# Patient Record
Sex: MALE | Race: WHITE | HISPANIC OR LATINO | URBAN - METROPOLITAN AREA
[De-identification: names, ages, dates, MRNs, and addresses within clinical notes are randomized per-mention and may not be internally consistent; named-entity substitution may affect disease eponyms.]

---

## 2017-02-22 ENCOUNTER — OUTPATIENT (OUTPATIENT)
Dept: OUTPATIENT SERVICES | Facility: HOSPITAL | Age: 77
LOS: 1 days | End: 2017-02-22
Payer: MEDICAID

## 2017-02-22 ENCOUNTER — LABORATORY RESULT (OUTPATIENT)
Age: 77
End: 2017-02-22

## 2017-02-22 ENCOUNTER — APPOINTMENT (OUTPATIENT)
Dept: INTERNAL MEDICINE | Facility: CLINIC | Age: 77
End: 2017-02-22

## 2017-02-22 ENCOUNTER — MED ADMIN CHARGE (OUTPATIENT)
Age: 77
End: 2017-02-22

## 2017-02-22 VITALS
WEIGHT: 183 LBS | HEIGHT: 60 IN | SYSTOLIC BLOOD PRESSURE: 120 MMHG | BODY MASS INDEX: 35.93 KG/M2 | DIASTOLIC BLOOD PRESSURE: 58 MMHG

## 2017-02-22 DIAGNOSIS — E66.9 OBESITY, UNSPECIFIED: ICD-10-CM

## 2017-02-22 DIAGNOSIS — I10 ESSENTIAL (PRIMARY) HYPERTENSION: ICD-10-CM

## 2017-02-22 DIAGNOSIS — K76.0 FATTY (CHANGE OF) LIVER, NOT ELSEWHERE CLASSIFIED: ICD-10-CM

## 2017-02-22 DIAGNOSIS — Z83.3 FAMILY HISTORY OF DIABETES MELLITUS: ICD-10-CM

## 2017-02-22 DIAGNOSIS — M75.01 ADHESIVE CAPSULITIS OF RIGHT SHOULDER: ICD-10-CM

## 2017-02-22 LAB
ALBUMIN SERPL ELPH-MCNC: 4.3 G/DL — SIGNIFICANT CHANGE UP (ref 3.3–5)
ALP SERPL-CCNC: 141 U/L — HIGH (ref 40–120)
ALT FLD-CCNC: 56 U/L — HIGH (ref 10–45)
ANION GAP SERPL CALC-SCNC: 15 MMOL/L — SIGNIFICANT CHANGE UP (ref 5–17)
AST SERPL-CCNC: 42 U/L — HIGH (ref 10–40)
BILIRUB SERPL-MCNC: 0.7 MG/DL — SIGNIFICANT CHANGE UP (ref 0.2–1.2)
BUN SERPL-MCNC: 20 MG/DL — SIGNIFICANT CHANGE UP (ref 7–23)
CALCIUM SERPL-MCNC: 9.5 MG/DL — SIGNIFICANT CHANGE UP (ref 8.4–10.5)
CHLORIDE SERPL-SCNC: 102 MMOL/L — SIGNIFICANT CHANGE UP (ref 96–108)
CO2 SERPL-SCNC: 26 MMOL/L — SIGNIFICANT CHANGE UP (ref 22–31)
CREAT SERPL-MCNC: 0.94 MG/DL — SIGNIFICANT CHANGE UP (ref 0.5–1.3)
GLUCOSE SERPL-MCNC: 119 MG/DL — HIGH (ref 70–99)
HCT VFR BLD CALC: 43.6 % — SIGNIFICANT CHANGE UP (ref 39–50)
HGB BLD-MCNC: 14.4 G/DL — SIGNIFICANT CHANGE UP (ref 13–17)
MCHC RBC-ENTMCNC: 32.2 PG — SIGNIFICANT CHANGE UP (ref 27–34)
MCHC RBC-ENTMCNC: 33 GM/DL — SIGNIFICANT CHANGE UP (ref 32–36)
MCV RBC AUTO: 97.5 FL — SIGNIFICANT CHANGE UP (ref 80–100)
PLATELET # BLD AUTO: 216 K/UL — SIGNIFICANT CHANGE UP (ref 150–400)
POTASSIUM SERPL-MCNC: 4.4 MMOL/L — SIGNIFICANT CHANGE UP (ref 3.5–5.3)
POTASSIUM SERPL-SCNC: 4.4 MMOL/L — SIGNIFICANT CHANGE UP (ref 3.5–5.3)
PROT SERPL-MCNC: 7.4 G/DL — SIGNIFICANT CHANGE UP (ref 6–8.3)
RBC # BLD: 4.47 M/UL — SIGNIFICANT CHANGE UP (ref 4.2–5.8)
RBC # FLD: 13.3 % — SIGNIFICANT CHANGE UP (ref 10.3–14.5)
SODIUM SERPL-SCNC: 143 MMOL/L — SIGNIFICANT CHANGE UP (ref 135–145)
WBC # BLD: 6.5 K/UL — SIGNIFICANT CHANGE UP (ref 3.8–10.5)
WBC # FLD AUTO: 6.5 K/UL — SIGNIFICANT CHANGE UP (ref 3.8–10.5)

## 2017-02-22 PROCEDURE — 83036 HEMOGLOBIN GLYCOSYLATED A1C: CPT

## 2017-02-22 PROCEDURE — 80053 COMPREHEN METABOLIC PANEL: CPT

## 2017-02-22 PROCEDURE — G0008: CPT

## 2017-02-22 PROCEDURE — G0463: CPT

## 2017-02-22 PROCEDURE — 80061 LIPID PANEL: CPT

## 2017-02-22 PROCEDURE — G0009: CPT

## 2017-02-22 PROCEDURE — 85027 COMPLETE CBC AUTOMATED: CPT

## 2017-02-22 PROCEDURE — 90670 PCV13 VACCINE IM: CPT

## 2017-02-22 PROCEDURE — 90688 IIV4 VACCINE SPLT 0.5 ML IM: CPT

## 2017-02-23 DIAGNOSIS — K76.0 FATTY (CHANGE OF) LIVER, NOT ELSEWHERE CLASSIFIED: ICD-10-CM

## 2017-02-23 DIAGNOSIS — E66.9 OBESITY, UNSPECIFIED: ICD-10-CM

## 2017-02-23 DIAGNOSIS — Z83.3 FAMILY HISTORY OF DIABETES MELLITUS: ICD-10-CM

## 2017-02-23 DIAGNOSIS — E65 LOCALIZED ADIPOSITY: ICD-10-CM

## 2017-02-23 DIAGNOSIS — M75.01 ADHESIVE CAPSULITIS OF RIGHT SHOULDER: ICD-10-CM

## 2017-02-23 DIAGNOSIS — Z00.00 ENCOUNTER FOR GENERAL ADULT MEDICAL EXAMINATION WITHOUT ABNORMAL FINDINGS: ICD-10-CM

## 2017-02-23 DIAGNOSIS — N40.1 BENIGN PROSTATIC HYPERPLASIA WITH LOWER URINARY TRACT SYMPTOMS: ICD-10-CM

## 2017-02-23 DIAGNOSIS — R35.1 NOCTURIA: ICD-10-CM

## 2017-02-23 DIAGNOSIS — N40.2 NODULAR PROSTATE WITHOUT LOWER URINARY TRACT SYMPTOMS: ICD-10-CM

## 2017-02-23 DIAGNOSIS — Z23 ENCOUNTER FOR IMMUNIZATION: ICD-10-CM

## 2017-02-23 LAB
CHOLEST SERPL-MCNC: 184 MG/DL — SIGNIFICANT CHANGE UP (ref 10–199)
HBA1C BLD-MCNC: 6 % — HIGH (ref 4–5.6)
HDLC SERPL-MCNC: 40 MG/DL — SIGNIFICANT CHANGE UP (ref 40–125)
LIPID PNL WITH DIRECT LDL SERPL: SIGNIFICANT CHANGE UP
TOTAL CHOLESTEROL/HDL RATIO MEASUREMENT: 4.6 RATIO — SIGNIFICANT CHANGE UP (ref 3.4–9.6)
TRIGL SERPL-MCNC: 435 MG/DL — HIGH (ref 10–149)

## 2018-04-26 ENCOUNTER — EMERGENCY (EMERGENCY)
Facility: HOSPITAL | Age: 78
LOS: 1 days | Discharge: HOME | End: 2018-04-26
Attending: EMERGENCY MEDICINE
Payer: MEDICAID

## 2018-04-26 VITALS
OXYGEN SATURATION: 96 % | RESPIRATION RATE: 17 BRPM | SYSTOLIC BLOOD PRESSURE: 158 MMHG | HEIGHT: 64 IN | WEIGHT: 175.05 LBS | DIASTOLIC BLOOD PRESSURE: 76 MMHG | TEMPERATURE: 98 F | HEART RATE: 60 BPM

## 2018-04-26 PROCEDURE — 96374 THER/PROPH/DIAG INJ IV PUSH: CPT | Mod: XU

## 2018-04-26 PROCEDURE — 51701 INSERT BLADDER CATHETER: CPT

## 2018-04-26 PROCEDURE — 99284 EMERGENCY DEPT VISIT MOD MDM: CPT | Mod: 25

## 2018-04-26 PROCEDURE — 93005 ELECTROCARDIOGRAM TRACING: CPT | Mod: XU

## 2018-04-26 PROCEDURE — 99283 EMERGENCY DEPT VISIT LOW MDM: CPT

## 2018-04-26 RX ORDER — LIDOCAINE 4 G/100G
1 CREAM TOPICAL ONCE
Qty: 0 | Refills: 0 | Status: COMPLETED | OUTPATIENT
Start: 2018-04-26 | End: 2018-04-26

## 2018-04-26 RX ORDER — IBUPROFEN 200 MG
600 TABLET ORAL ONCE
Qty: 0 | Refills: 0 | Status: COMPLETED | OUTPATIENT
Start: 2018-04-26 | End: 2018-04-26

## 2018-04-26 RX ADMIN — LIDOCAINE 1 PATCH: 4 CREAM TOPICAL at 16:00

## 2018-04-26 RX ADMIN — Medication 600 MILLIGRAM(S): at 16:00

## 2018-04-26 NOTE — ED ADULT NURSE NOTE - OBJECTIVE STATEMENT
78 year old male A&OX3 presents with lower back pain that started Saturday after lifting something heavy. Patient states that pain is located in the lower back and on the left side. Patient also notes that over the past month the left arm has had a burning and shooting pain intermittently. Patient denies taking medication for the pain. Patient has equal strength and sensation bilaterally. Patient has pulses present

## 2018-04-26 NOTE — ED PROVIDER NOTE - EXTREMITY EXAM
no deformity, pain or tenderness, no restriction of movement no dermatitis, no environmental allergies, no food allergies, no immunosuppressive disorder, and no pruritus.

## 2018-04-26 NOTE — ED PROVIDER NOTE - MEDICAL DECISION MAKING DETAILS
AMY Rebollar MD: Pt is a 77 y/o male with no sig pmh who p/w c/o 9/10, non-radiating sharp right lower back pain x 5 days after lifting garbage. Patient has been taking tylenol at home w/out relief. States that he has had persistent pain to R lower back daily despite tylenol. Pain most notable when transitioning from sitting to standing. Pain is not midline and does not radiate into buttock or leg. Denies saddle anesthesia, weakness. numbness, tingling, inability to walk, imbalance, bowel/bladder incontinence, blunt trauma. Exam with +R paraspinal lower lumbosacral muscular ttp without midline ttp. Neg SLR. Suspect muscle spasm. Will treat with nsaids and re-eval. If pt improves, will recommend motrin / tylenol prn pain and PMD f/u with close return precautions.

## 2018-04-26 NOTE — ED PROVIDER NOTE - PLAN OF CARE
1. Stay hydrated. Ice 20mins on, 40 mins off for first 48hrs of onset of pain, after that heat in cycle: 20 mins on, 40 mins off. Avoid strenous activity, twisting and heavy lifting.  2. Take Ibuprofen 600mg every 6hrs for pain as needed- take with food.  You may also try over the counter Lidoderm patches for additional relief   3. Follow up with your PCP  24-48 hours. For continued pain follow up with the spine center [phone]  4. Return to ED for worsening of symptoms including fever, weakness, numbness, bowel/urine incontinence and all other concerns.

## 2018-04-26 NOTE — ED PROVIDER NOTE - OBJECTIVE STATEMENT
78 year old male with no 78 year old male with no sig pmh presents c/o 9/10, non-radiating sharp right lower back pain x 5 days after lifting garbage. Patient has been taking tylenol at home w/out relief. States that he has had persistent pain daily despite tylenol. Pain most notable when transitioning from sitting to standing. Denies saddle anesthesia, weakness. numbness, tingling, inability to walk, imbalance

## 2018-04-26 NOTE — ED PROVIDER NOTE - CARE PLAN
Principal Discharge DX:	Back pain  Assessment and plan of treatment:	1. Stay hydrated. Ice 20mins on, 40 mins off for first 48hrs of onset of pain, after that heat in cycle: 20 mins on, 40 mins off. Avoid strenous activity, twisting and heavy lifting.  2. Take Ibuprofen 600mg every 6hrs for pain as needed- take with food.  You may also try over the counter Lidoderm patches for additional relief   3. Follow up with your PCP  24-48 hours. For continued pain follow up with the spine center [phone]  4. Return to ED for worsening of symptoms including fever, weakness, numbness, bowel/urine incontinence and all other concerns.

## 2018-05-01 ENCOUNTER — APPOINTMENT (OUTPATIENT)
Dept: INTERNAL MEDICINE | Facility: CLINIC | Age: 78
End: 2018-05-01
Payer: MEDICAID

## 2018-05-01 ENCOUNTER — OUTPATIENT (OUTPATIENT)
Dept: OUTPATIENT SERVICES | Facility: HOSPITAL | Age: 78
LOS: 1 days | End: 2018-05-01
Payer: MEDICAID

## 2018-05-01 VITALS
DIASTOLIC BLOOD PRESSURE: 80 MMHG | HEART RATE: 59 BPM | SYSTOLIC BLOOD PRESSURE: 130 MMHG | OXYGEN SATURATION: 97 % | WEIGHT: 181 LBS | HEIGHT: 60 IN | BODY MASS INDEX: 35.53 KG/M2

## 2018-05-01 DIAGNOSIS — I10 ESSENTIAL (PRIMARY) HYPERTENSION: ICD-10-CM

## 2018-05-01 PROCEDURE — G0463: CPT

## 2018-05-01 PROCEDURE — 99213 OFFICE O/P EST LOW 20 MIN: CPT | Mod: GE

## 2018-05-01 PROCEDURE — G9001: CPT

## 2018-05-08 DIAGNOSIS — M54.5 LOW BACK PAIN: ICD-10-CM

## 2018-05-09 DIAGNOSIS — R69 ILLNESS, UNSPECIFIED: ICD-10-CM

## 2018-12-03 ENCOUNTER — OUTPATIENT (OUTPATIENT)
Dept: OUTPATIENT SERVICES | Facility: HOSPITAL | Age: 78
LOS: 1 days | End: 2018-12-03
Payer: MEDICARE

## 2018-12-03 ENCOUNTER — APPOINTMENT (OUTPATIENT)
Dept: INTERNAL MEDICINE | Facility: CLINIC | Age: 78
End: 2018-12-03
Payer: MEDICAID

## 2018-12-03 VITALS
WEIGHT: 179 LBS | SYSTOLIC BLOOD PRESSURE: 140 MMHG | HEIGHT: 60 IN | DIASTOLIC BLOOD PRESSURE: 80 MMHG | BODY MASS INDEX: 35.14 KG/M2

## 2018-12-03 DIAGNOSIS — L72.3 SEBACEOUS CYST: ICD-10-CM

## 2018-12-03 DIAGNOSIS — I10 ESSENTIAL (PRIMARY) HYPERTENSION: ICD-10-CM

## 2018-12-03 PROCEDURE — G0463: CPT

## 2018-12-03 PROCEDURE — 99214 OFFICE O/P EST MOD 30 MIN: CPT | Mod: GC

## 2018-12-10 ENCOUNTER — OUTPATIENT (OUTPATIENT)
Dept: OUTPATIENT SERVICES | Facility: HOSPITAL | Age: 78
LOS: 1 days | End: 2018-12-10

## 2018-12-10 ENCOUNTER — APPOINTMENT (OUTPATIENT)
Dept: PLASTIC SURGERY | Facility: HOSPITAL | Age: 78
End: 2018-12-10

## 2018-12-10 VITALS
HEIGHT: 60 IN | WEIGHT: 179 LBS | DIASTOLIC BLOOD PRESSURE: 66 MMHG | SYSTOLIC BLOOD PRESSURE: 136 MMHG | BODY MASS INDEX: 35.14 KG/M2 | HEART RATE: 63 BPM

## 2018-12-11 DIAGNOSIS — L72.3 SEBACEOUS CYST: ICD-10-CM

## 2018-12-15 NOTE — HISTORY OF PRESENT ILLNESS
[Family Member] : family member [FreeTextEntry8] : 78M PMH back pain, BPH, adhesive capitulitis, hepatic steatosis, presenting for a bump in his back for the past 3 months\par \par #Bump in his back\par - patient accompanied by daughter who provides translation as he is Wallisian-speaking\par - she reports he has had a bump in his back for the past 3 months\par - the bump is itchy and burning but not painful\par - she has noticed the bump has been growing and recently exploded 1 week prior to presentation. The patient's wife expressed ?pus/fluid from the bump however it still persists. \par - patient denies fever/chills, pain, nausea/vomiting, other acute symptoms.

## 2018-12-15 NOTE — REVIEW OF SYSTEMS
[Itching] : itching [Skin Rash] : skin rash [Fever] : no fever [Chills] : no chills [Discharge] : no discharge [Vision Problems] : no vision problems [Earache] : no earache [Chest Pain] : no chest pain [Palpitations] : no palpitations [Shortness Of Breath] : no shortness of breath [Abdominal Pain] : no abdominal pain [Nausea] : no nausea [Vomiting] : no vomiting [Dysuria] : no dysuria [Joint Pain] : no joint pain [Back Pain] : no back pain [Headache] : no headache

## 2018-12-15 NOTE — PHYSICAL EXAM
[No Acute Distress] : no acute distress [Well Nourished] : well nourished [Well Developed] : well developed [Well-Appearing] : well-appearing [Normal Sclera/Conjunctiva] : normal sclera/conjunctiva [PERRL] : pupils equal round and reactive to light [EOMI] : extraocular movements intact [Normal Outer Ear/Nose] : the outer ears and nose were normal in appearance [Normal Oropharynx] : the oropharynx was normal [No JVD] : no jugular venous distention [Supple] : supple [No Lymphadenopathy] : no lymphadenopathy [No Respiratory Distress] : no respiratory distress  [Clear to Auscultation] : lungs were clear to auscultation bilaterally [Normal Rate] : normal rate  [Regular Rhythm] : with a regular rhythm [No Edema] : there was no peripheral edema [Soft] : abdomen soft [Non Tender] : non-tender [Non-distended] : non-distended [No HSM] : no HSM [No Spinal Tenderness] : no spinal tenderness [No Joint Swelling] : no joint swelling [Normal Gait] : normal gait [Normal Affect] : the affect was normal [Normal Insight/Judgement] : insight and judgment were intact [de-identified] : erythematous lesion in the paracentral region of the thoracic region of the back, fluctuant, with central ulceration, expressing puss and likely sebum when compressed. The lesion is circular but appears to contain underlying sinus tract.

## 2018-12-15 NOTE — HEALTH RISK ASSESSMENT
[No falls in past year] : Patient reported no falls in the past year [0] : 2) Feeling down, depressed, or hopeless: Not at all (0) [] : No [[id number]] : 0

## 2018-12-15 NOTE — ASSESSMENT
[FreeTextEntry1] : 78M PMH back pain, BPH, adhesive capitulitis, hepatic steatosis, presenting for a bump in his back for the past 3 months draining pus and sebum with ?sinus tract on physical exam, likely consistent with sebaceous cyst with possible bacterial superinfection.

## 2018-12-15 NOTE — PLAN
[FreeTextEntry1] : #sebaceous cyst\par - performed in-office manual drainage expressing sebum and pus\par - counseled patient on sterile techniques for continued drainage\par - prescribing Bactrim DS BID x 7 days to cover for bacterial superinfection of sebaceous cyst\par - provided patient with plastic surgery referral for possible surgical excision of cyst \par \par HCM\par - will defer flu shot given current cyst with superinfeciton\par - RTC for f/u for flu shot, labs\par \par - discussed with Dr. Yee

## 2018-12-17 ENCOUNTER — OUTPATIENT (OUTPATIENT)
Dept: OUTPATIENT SERVICES | Facility: HOSPITAL | Age: 78
LOS: 1 days | End: 2018-12-17

## 2018-12-17 ENCOUNTER — APPOINTMENT (OUTPATIENT)
Dept: PLASTIC SURGERY | Facility: HOSPITAL | Age: 78
End: 2018-12-17

## 2018-12-17 VITALS
SYSTOLIC BLOOD PRESSURE: 123 MMHG | WEIGHT: 179 LBS | HEIGHT: 61 IN | DIASTOLIC BLOOD PRESSURE: 53 MMHG | BODY MASS INDEX: 33.79 KG/M2 | HEART RATE: 59 BPM

## 2018-12-18 ENCOUNTER — APPOINTMENT (OUTPATIENT)
Dept: INTERNAL MEDICINE | Facility: CLINIC | Age: 78
End: 2018-12-18

## 2018-12-18 VITALS
HEART RATE: 70 BPM | WEIGHT: 178 LBS | OXYGEN SATURATION: 96 % | DIASTOLIC BLOOD PRESSURE: 80 MMHG | BODY MASS INDEX: 33.61 KG/M2 | HEIGHT: 61 IN | SYSTOLIC BLOOD PRESSURE: 132 MMHG

## 2018-12-18 DIAGNOSIS — L72.3 SEBACEOUS CYST: ICD-10-CM

## 2018-12-18 DIAGNOSIS — R35.1 BENIGN PROSTATIC HYPERPLASIA WITH LOWER URINARY TRACT SYMPMS: ICD-10-CM

## 2018-12-18 DIAGNOSIS — M54.12 RADICULOPATHY, CERVICAL REGION: ICD-10-CM

## 2018-12-18 DIAGNOSIS — N40.1 BENIGN PROSTATIC HYPERPLASIA WITH LOWER URINARY TRACT SYMPMS: ICD-10-CM

## 2018-12-18 RX ORDER — CYCLOBENZAPRINE HYDROCHLORIDE 5 MG/1
5 TABLET, FILM COATED ORAL 3 TIMES DAILY
Qty: 15 | Refills: 0 | Status: COMPLETED | COMMUNITY
Start: 2018-05-01 | End: 2018-12-18

## 2018-12-18 RX ORDER — TAMSULOSIN HYDROCHLORIDE 0.4 MG/1
0.4 CAPSULE ORAL
Qty: 90 | Refills: 1 | Status: COMPLETED | COMMUNITY
Start: 2017-02-22 | End: 2018-12-18

## 2018-12-19 ENCOUNTER — CHART COPY (OUTPATIENT)
Age: 78
End: 2018-12-19

## 2018-12-19 LAB
ALBUMIN SERPL ELPH-MCNC: 4.4 G/DL
ALP BLD-CCNC: 153 U/L
ALT SERPL-CCNC: 38 U/L
ANION GAP SERPL CALC-SCNC: 11 MMOL/L
AST SERPL-CCNC: 32 U/L
BILIRUB SERPL-MCNC: 0.3 MG/DL
BUN SERPL-MCNC: 24 MG/DL
CALCIUM SERPL-MCNC: 9.9 MG/DL
CHLORIDE SERPL-SCNC: 101 MMOL/L
CHOLEST SERPL-MCNC: 164 MG/DL
CHOLEST/HDLC SERPL: 4 RATIO
CO2 SERPL-SCNC: 28 MMOL/L
CREAT SERPL-MCNC: 1.29 MG/DL
GLUCOSE SERPL-MCNC: 109 MG/DL
HBA1C MFR BLD HPLC: 5.8 %
HDLC SERPL-MCNC: 41 MG/DL
LDLC SERPL CALC-MCNC: 50 MG/DL
POTASSIUM SERPL-SCNC: 4.1 MMOL/L
PROT SERPL-MCNC: 7.8 G/DL
SODIUM SERPL-SCNC: 140 MMOL/L
TRIGL SERPL-MCNC: 363 MG/DL

## 2018-12-21 NOTE — PHYSICAL EXAM
[No Acute Distress] : no acute distress [Well Nourished] : well nourished [Well Developed] : well developed [Well-Appearing] : well-appearing [Normal Sclera/Conjunctiva] : normal sclera/conjunctiva [PERRL] : pupils equal round and reactive to light [EOMI] : extraocular movements intact [Normal Outer Ear/Nose] : the outer ears and nose were normal in appearance [Normal Oropharynx] : the oropharynx was normal [Normal TMs] : both tympanic membranes were normal [Normal Nasal Mucosa] : the nasal mucosa was normal [No JVD] : no jugular venous distention [Supple] : supple [No Lymphadenopathy] : no lymphadenopathy [Thyroid Normal, No Nodules] : the thyroid was normal and there were no nodules present [No Respiratory Distress] : no respiratory distress  [Clear to Auscultation] : lungs were clear to auscultation bilaterally [No Accessory Muscle Use] : no accessory muscle use [Normal Rate] : normal rate  [Regular Rhythm] : with a regular rhythm [Normal S1, S2] : normal S1 and S2 [No Murmur] : no murmur heard [No Carotid Bruits] : no carotid bruits [Soft] : abdomen soft [Non Tender] : non-tender [No HSM] : no HSM [Normal Bowel Sounds] : normal bowel sounds [Normal Supraclavicular Nodes] : no supraclavicular lymphadenopathy [Normal Posterior Cervical Nodes] : no posterior cervical lymphadenopathy [Normal Anterior Cervical Nodes] : no anterior cervical lymphadenopathy [No Joint Swelling] : no joint swelling [de-identified] : Sebaceous cyst present between the scapula, draining blood, no pus

## 2018-12-21 NOTE — END OF VISIT
[] : Resident [FreeTextEntry3] : 79 yo M with PMhx of chronic back pain, hepatic steatosis, and sebaceous cyst presents for CPE. \par Bilateral neuropathic pain-Likely cervical radiculopathy. PT referral given\par Seaceous Cyst-Well healed \par HCM-FIT given today as patient is fairly active and lack of medical comorbidities. Likely will live

## 2018-12-21 NOTE — REVIEW OF SYSTEMS
[Nocturia] : nocturia [Frequency] : frequency [Back Pain] : back pain [Fever] : no fever [Chills] : no chills [Fatigue] : no fatigue [Night Sweats] : no night sweats [Earache] : no earache [Nasal Discharge] : no nasal discharge [Chest Pain] : no chest pain [Palpitations] : no palpitations [Claudication] : no  leg claudication [Lower Ext Edema] : no lower extremity edema [Orthopena] : no orthopnea [Paroysmal Nocturnal Dyspnea] : no paroysmal nocturnal dyspnea [Shortness Of Breath] : no shortness of breath [Wheezing] : no wheezing [Cough] : no cough [Dyspnea on Exertion] : not dyspnea on exertion [Abdominal Pain] : no abdominal pain [Nausea] : no nausea [Constipation] : no constipation [Diarrhea] : no diarrhea [Vomiting] : no vomiting [Heartburn] : no heartburn [Melena] : no melena [Dysuria] : no dysuria [Incontinence] : no incontinence [Hesitancy] : no hesitancy [Hematuria] : no hematuria [Joint Pain] : no joint pain [Headache] : no headache [Dizziness] : no dizziness

## 2018-12-21 NOTE — HISTORY OF PRESENT ILLNESS
[FreeTextEntry1] : Annual physical exam  [de-identified] : 79 yo M with PMhx of chronic back pain, hepatic steatosis, and sebaceous cyst presents for annual physical examination. Pt reports that he has been experiencing b/l tingling sensation in his arms. It worsens with immobility and improves with exercise. He has been taking Aleve at home with mild improvement. He also endorses b/l calf spasm, especially in the morning. It improves with exercise and movement. It worsens with rest. He also endorses nocturia, increased urinary frequency, and incomplete voiding. \par In regards to his sebaceous cyst, he followed up with plastic surgery and got it drained manually. He was also started on 7 days course of Bactrim. He was advised to follow up in 2 months. \par

## 2018-12-21 NOTE — ASSESSMENT
[FreeTextEntry1] : 77 yo M with PMhx of chronic back pain, hepatic steatosis, and sebaceous cyst presents for annual physical examination.\par \par #Cervical radiculopathy\par -B/l radiating tingling sensation in UE that improves with exercise \par -PT referral and symptomatic management with OTC NSAIDs. \par \par #Sebaceous Cyst \par -Drained during last visit. Currently, nontender, non purulent\par -Follow up with plastic surgery in 2 months \par -7 days course of Bactrim completed \par \par #HCM\par -FIT test for colon cancer screening \par -Flu vaccine administered\par -F/u with HbA1c, lipid profile, and CMP. \par \par Discussed with Dr. Haley

## 2019-02-11 ENCOUNTER — APPOINTMENT (OUTPATIENT)
Dept: PLASTIC SURGERY | Facility: HOSPITAL | Age: 79
End: 2019-02-11

## 2019-02-11 ENCOUNTER — OUTPATIENT (OUTPATIENT)
Dept: OUTPATIENT SERVICES | Facility: HOSPITAL | Age: 79
LOS: 1 days | End: 2019-02-11

## 2019-02-11 VITALS
BODY MASS INDEX: 35.5 KG/M2 | WEIGHT: 188 LBS | HEART RATE: 64 BPM | SYSTOLIC BLOOD PRESSURE: 141 MMHG | DIASTOLIC BLOOD PRESSURE: 76 MMHG | HEIGHT: 61 IN

## 2019-02-11 NOTE — PHYSICAL EXAM
[de-identified] : alert, NAD [de-identified] : 1x3cm healed scar with 3x3cm of induration inferiorly. Non-tender to palpation. No surrounding erythema. Upon palpation, sebum expressible from punctum. No purulence.

## 2019-02-11 NOTE — HISTORY OF PRESENT ILLNESS
[FreeTextEntry1] : 79 y/o M returning to plastics clinic for evaluation of sebaceous cyst to the back. Patient is Monegasque speaking and daughter translating in the room. The patient was first seen on 12/10/2018 for a raised and draining lesion on his upper back that was most likely a sebaceous cyst. He was instructed to perform daily packing and told to come back in 7 days. Last clinic visit was 12/17/2018 and was seen to have non-draining lesion with only a small amount of serosanguinous fluid expressed on palpation.\par \par Denies fever, chills, back pain, drainage from back. Has been cleaning area regularly while showering.

## 2019-02-11 NOTE — ASSESSMENT
[FreeTextEntry1] : 77 y/o M with upper back sebaceous cyst, not infected. No pain. Expressible sebum. Patient would like cyst removed.\par - discussed excision of sebaceous cyst under local with patient\par - patient wishes to have cyst excised\par - f/u in 2 weeks for 40 minute procedure for excision

## 2019-02-13 DIAGNOSIS — L72.3 SEBACEOUS CYST: ICD-10-CM

## 2019-02-25 ENCOUNTER — APPOINTMENT (OUTPATIENT)
Dept: PLASTIC SURGERY | Facility: HOSPITAL | Age: 79
End: 2019-02-25

## 2019-02-25 ENCOUNTER — OUTPATIENT (OUTPATIENT)
Dept: OUTPATIENT SERVICES | Facility: HOSPITAL | Age: 79
LOS: 1 days | End: 2019-02-25

## 2019-02-25 ENCOUNTER — OTHER (OUTPATIENT)
Age: 79
End: 2019-02-25

## 2019-02-25 VITALS
DIASTOLIC BLOOD PRESSURE: 69 MMHG | HEIGHT: 61 IN | WEIGHT: 188 LBS | BODY MASS INDEX: 35.5 KG/M2 | SYSTOLIC BLOOD PRESSURE: 142 MMHG

## 2019-02-25 NOTE — HISTORY OF PRESENT ILLNESS
[FreeTextEntry1] : 78M with upper back sebaceous cyst presented to [facility] clinic for excision today. \par

## 2019-02-25 NOTE — PHYSICAL EXAM
[de-identified] : No palpable/definable mass appreciated on the back. Small amount of sebum could be expressed. No open wound.

## 2019-02-25 NOTE — ASSESSMENT
[FreeTextEntry1] : 78M with upper back sebaceous cyst\par >> As per the patient, several family members expressed the cyst prior to his appointment today. As a result, I am unable to easily appreciate the extent of the mass/cyst this afternoon. I explained that the lack of definition of the mass/cyst makes the excision of the mass/cyst more difficult and potentially fraught with more complications. I further explained that if the cyst reaccumulates, I would be happy to perform the procedure. The patient understood my reasoning and agreed to schedule a follow up appointment in x2 weeks---in the event that the cyst "re-fills". \par

## 2019-03-01 DIAGNOSIS — L72.3 SEBACEOUS CYST: ICD-10-CM

## 2019-03-11 ENCOUNTER — APPOINTMENT (OUTPATIENT)
Dept: PLASTIC SURGERY | Facility: HOSPITAL | Age: 79
End: 2019-03-11

## 2019-03-11 ENCOUNTER — OUTPATIENT (OUTPATIENT)
Dept: OUTPATIENT SERVICES | Facility: HOSPITAL | Age: 79
LOS: 1 days | End: 2019-03-11

## 2019-03-11 VITALS
BODY MASS INDEX: 35.5 KG/M2 | DIASTOLIC BLOOD PRESSURE: 59 MMHG | SYSTOLIC BLOOD PRESSURE: 133 MMHG | HEART RATE: 64 BPM | HEIGHT: 61 IN | WEIGHT: 188 LBS

## 2019-03-12 DIAGNOSIS — L72.3 SEBACEOUS CYST: ICD-10-CM

## 2019-03-18 NOTE — ASSESSMENT
[FreeTextEntry1] : 78M with upper back sebaceous cyst\par Still no discrete appreciable mass/cyst of the upper back this afternoon. It was explained to the family and patient that the lack of definition of the mass/cyst makes the excision of the mass/cyst more difficult and potentially fraught with more complications. Explained that if the cyst reaccumulates, then we could perform the procedure. The patient understood and agreed to schedule a follow up appointment in x4 weeks---in the event that the cyst  "re-fills".

## 2019-03-18 NOTE — REASON FOR VISIT
[Follow-Up: _____] : a [unfilled] follow-up visit [FreeTextEntry1] : 78M with upper back sebaceous cyst presented to [facility] clinic for excision today.

## 2019-03-18 NOTE — PHYSICAL EXAM
[de-identified] : B [de-identified] : Back: In midline upper back no palpable/definable mass appreciated, no sebum could be expressed. No open wound.

## 2019-04-08 ENCOUNTER — APPOINTMENT (OUTPATIENT)
Dept: PLASTIC SURGERY | Facility: HOSPITAL | Age: 79
End: 2019-04-08

## 2020-01-07 NOTE — ED PROVIDER NOTE - NS ED ATTENDING STATEMENT MOD
Warm I have personally performed a face to face diagnostic evaluation on this patient. I have reviewed the ACP note and agree with the history, exam and plan of care, except as noted.

## 2020-08-03 ENCOUNTER — OUTPATIENT (OUTPATIENT)
Dept: OUTPATIENT SERVICES | Facility: HOSPITAL | Age: 80
LOS: 1 days | End: 2020-08-03
Payer: COMMERCIAL

## 2020-08-03 ENCOUNTER — APPOINTMENT (OUTPATIENT)
Dept: INTERNAL MEDICINE | Facility: CLINIC | Age: 80
End: 2020-08-03
Payer: MEDICARE

## 2020-08-03 VITALS
DIASTOLIC BLOOD PRESSURE: 82 MMHG | SYSTOLIC BLOOD PRESSURE: 130 MMHG | BODY MASS INDEX: 34.17 KG/M2 | WEIGHT: 181 LBS | HEIGHT: 61 IN

## 2020-08-03 DIAGNOSIS — L72.3 SEBACEOUS CYST: ICD-10-CM

## 2020-08-03 DIAGNOSIS — M54.5 LOW BACK PAIN: ICD-10-CM

## 2020-08-03 DIAGNOSIS — I10 ESSENTIAL (PRIMARY) HYPERTENSION: ICD-10-CM

## 2020-08-03 DIAGNOSIS — K76.0 FATTY (CHANGE OF) LIVER, NOT ELSEWHERE CLASSIFIED: ICD-10-CM

## 2020-08-03 PROCEDURE — G0463: CPT

## 2020-08-03 PROCEDURE — 99214 OFFICE O/P EST MOD 30 MIN: CPT | Mod: GC

## 2020-08-04 LAB
ALBUMIN SERPL ELPH-MCNC: 4.5 G/DL
ALP BLD-CCNC: 149 U/L
ALT SERPL-CCNC: 41 U/L
ANION GAP SERPL CALC-SCNC: 13 MMOL/L
AST SERPL-CCNC: 28 U/L
BASOPHILS # BLD AUTO: 0.02 K/UL
BASOPHILS NFR BLD AUTO: 0.3 %
BILIRUB SERPL-MCNC: 0.7 MG/DL
BUN SERPL-MCNC: 23 MG/DL
CALCIUM SERPL-MCNC: 9.8 MG/DL
CHLORIDE SERPL-SCNC: 103 MMOL/L
CHOLEST SERPL-MCNC: 164 MG/DL
CHOLEST/HDLC SERPL: 4.9 RATIO
CO2 SERPL-SCNC: 27 MMOL/L
CREAT SERPL-MCNC: 1.17 MG/DL
EOSINOPHIL # BLD AUTO: 0.24 K/UL
EOSINOPHIL NFR BLD AUTO: 3.7 %
ESTIMATED AVERAGE GLUCOSE: 131 MG/DL
GLUCOSE SERPL-MCNC: 102 MG/DL
HBA1C MFR BLD HPLC: 6.2 %
HCT VFR BLD CALC: 52.3 %
HDLC SERPL-MCNC: 34 MG/DL
HGB BLD-MCNC: 17.1 G/DL
IMM GRANULOCYTES NFR BLD AUTO: 0.3 %
LDLC SERPL CALC-MCNC: 57 MG/DL
LYMPHOCYTES # BLD AUTO: 2.39 K/UL
LYMPHOCYTES NFR BLD AUTO: 37.2 %
MAN DIFF?: NORMAL
MCHC RBC-ENTMCNC: 30.6 PG
MCHC RBC-ENTMCNC: 32.7 GM/DL
MCV RBC AUTO: 93.7 FL
MONOCYTES # BLD AUTO: 0.48 K/UL
MONOCYTES NFR BLD AUTO: 7.5 %
NEUTROPHILS # BLD AUTO: 3.28 K/UL
NEUTROPHILS NFR BLD AUTO: 51 %
PLATELET # BLD AUTO: 169 K/UL
POTASSIUM SERPL-SCNC: 4.8 MMOL/L
PROT SERPL-MCNC: 7.2 G/DL
RBC # BLD: 5.58 M/UL
RBC # FLD: 13.7 %
SARS-COV-2 IGG SERPL IA-ACNC: 0.09 INDEX
SARS-COV-2 IGG SERPL QL IA: NEGATIVE
SODIUM SERPL-SCNC: 143 MMOL/L
TRIGL SERPL-MCNC: 369 MG/DL
WBC # FLD AUTO: 6.43 K/UL

## 2020-08-07 NOTE — ASSESSMENT
[FreeTextEntry1] : 80M w/PMH of hepatic steatosis, chronic lower back pain (now resolved), sebaceous cyst (now resolved), presents for an annual visit [facility] and no medical issues since last being seen at our clinic in 12/2018.

## 2020-08-07 NOTE — PLAN
[FreeTextEntry1] : #Hepatic steatosis\par Diagnosed via US in 2015. No hepatomegaly appreciated on exam. Pt drinks 1 beer/wk, never smoker. Not currently taking any Rx medications, and only OTC APAP for minor aches/pains\par -CBC, CMP ordered to assess hematological and electrolyte status, liver enzymes\par \par #HCM\par -A1c, lipid profile ordered to assess glycemic control iso weight gain, obesity\par -Covid-19 Ab ordered per pt request (he has not experienced any Covid-19 related sx, however has recently returned from visiting family, some of whom have contracted the virus, as well as recent airline travel). Pt counseled on value of this test, encouraged to continue to take protective measures, irrespective of the result.

## 2020-08-07 NOTE — HISTORY OF PRESENT ILLNESS
[Family Member] : family member [de-identified] : Pt has not seen a PCP since his last clinic visit in 12/2018. He states he has been in good health, and has recently returned with his wife from a 5-month stay in [facility]. He and his wife self-quarantined for 2 weeks, and did not develop any fevers, SOB, chest pain, abdominal pain, diarrhea/constipation. He states his lower back pain has resolved, and he only takes OTC APAP for minor aches and pains. He states his sebaceous cyst (on his back) has also resolved with time.  [FreeTextEntry1] : No complaints at this time.

## 2020-08-07 NOTE — PHYSICAL EXAM
[Normal] : affect was normal and insight and judgment were intact [de-identified] : Obese  [de-identified] : Some erosion of skin on hands, b/l, w/peeling on palms. Thick, dark cuticles present on all fingers b/l.

## 2020-09-18 ENCOUNTER — APPOINTMENT (OUTPATIENT)
Dept: OPHTHALMOLOGY | Facility: CLINIC | Age: 80
End: 2020-09-18

## 2020-09-23 ENCOUNTER — APPOINTMENT (OUTPATIENT)
Dept: INTERNAL MEDICINE | Facility: CLINIC | Age: 80
End: 2020-09-23
Payer: MEDICARE

## 2020-09-23 ENCOUNTER — NON-APPOINTMENT (OUTPATIENT)
Age: 80
End: 2020-09-23

## 2020-09-23 ENCOUNTER — OUTPATIENT (OUTPATIENT)
Dept: OUTPATIENT SERVICES | Facility: HOSPITAL | Age: 80
LOS: 1 days | End: 2020-09-23
Payer: COMMERCIAL

## 2020-09-23 VITALS — SYSTOLIC BLOOD PRESSURE: 152 MMHG | DIASTOLIC BLOOD PRESSURE: 80 MMHG | HEIGHT: 61 IN

## 2020-09-23 VITALS — HEART RATE: 60 BPM

## 2020-09-23 DIAGNOSIS — Z01.818 ENCOUNTER FOR OTHER PREPROCEDURAL EXAMINATION: ICD-10-CM

## 2020-09-23 DIAGNOSIS — H25.099 OTHER AGE-RELATED INCIPIENT CATARACT, UNSPECIFIED EYE: ICD-10-CM

## 2020-09-23 DIAGNOSIS — I10 ESSENTIAL (PRIMARY) HYPERTENSION: ICD-10-CM

## 2020-09-23 DIAGNOSIS — Z23 ENCOUNTER FOR IMMUNIZATION: ICD-10-CM

## 2020-09-23 PROCEDURE — G0463: CPT | Mod: 25

## 2020-09-23 PROCEDURE — 90688 IIV4 VACCINE SPLT 0.5 ML IM: CPT

## 2020-09-23 PROCEDURE — G0008: CPT

## 2020-09-23 PROCEDURE — 99213 OFFICE O/P EST LOW 20 MIN: CPT | Mod: GE

## 2020-09-23 NOTE — PHYSICAL EXAM
[Soft] : abdomen soft [Non Tender] : non-tender [Non-distended] : non-distended [Normal Bowel Sounds] : normal bowel sounds [Normal] : affect was normal and insight and judgment were intact

## 2020-09-25 ENCOUNTER — APPOINTMENT (OUTPATIENT)
Dept: DISASTER EMERGENCY | Facility: CLINIC | Age: 80
End: 2020-09-25

## 2020-09-25 LAB — SARS-COV-2 N GENE NPH QL NAA+PROBE: NOT DETECTED

## 2020-09-30 ENCOUNTER — APPOINTMENT (OUTPATIENT)
Dept: OPHTHALMOLOGY | Facility: AMBULATORY SURGERY CENTER | Age: 80
End: 2020-09-30
Payer: MEDICARE

## 2020-09-30 PROCEDURE — 66982 XCAPSL CTRC RMVL CPLX WO ECP: CPT | Mod: LT

## 2020-10-01 ENCOUNTER — APPOINTMENT (OUTPATIENT)
Dept: OPHTHALMOLOGY | Facility: CLINIC | Age: 80
End: 2020-10-01

## 2020-10-09 ENCOUNTER — APPOINTMENT (OUTPATIENT)
Dept: OPHTHALMOLOGY | Facility: CLINIC | Age: 80
End: 2020-10-09

## 2020-12-29 NOTE — PLAN
[FreeTextEntry1] : 81 yo M with PMhx of chronic back pain, hepatic steatosis, and sebaceous cyst present for pre-op clearance\par \par ##RCRI score of 0\par -EKG shows sinus bradycardia, but without signs of ischemia or conduction disorder\par -recent lab work in August unremarkable\par -medically optimized for low risk procedure\par -rest of devorah-op care as per ophthalmology

## 2020-12-29 NOTE — HISTORY OF PRESENT ILLNESS
[No Pertinent Cardiac History] : no history of aortic stenosis, atrial fibrillation, coronary artery disease, recent myocardial infarction, or implantable device/pacemaker [No Pertinent Pulmonary History] : no history of asthma, COPD, sleep apnea, or smoking [No Adverse Anesthesia Reaction] : no adverse anesthesia reaction in self or family member [(Patient denies any chest pain, claudication, dyspnea on exertion, orthopnea, palpitations or syncope)] : Patient denies any chest pain, claudication, dyspnea on exertion, orthopnea, palpitations or syncope [Good (7-10 METs)] : Good (7-10 METs) [Family Member] : family member [Aortic Stenosis] : no aortic stenosis [Atrial Fibrillation] : no atrial fibrillation [Coronary Artery Disease] : no coronary artery disease [Recent Myocardial Infarction] : no recent myocardial infarction [Implantable Device/Pacemaker] : no implantable device/pacemaker [Chronic Anticoagulation] : no chronic anticoagulation [Chronic Kidney Disease] : no chronic kidney disease [Diabetes] : no diabetes [FreeTextEntry1] : Cataract surgery  [FreeTextEntry4] : 81 yo M with PMhx of chronic back pain, hepatic steatosis, and sebaceous cyst present for pre-op clearance for cararact surgery\par \par Patient has no cardiac or pulmonary history. Patient is not on any anti-platelet or anti-coagulants. No renal impairment. No prior surgeries. Had colonoscopy several years ago with no noted reaction. Can climb stairs without impairment. [FreeTextEntry7] : N/A

## 2020-12-29 NOTE — REVIEW OF SYSTEMS
[Vision Problems] : vision problems [Negative] : Psychiatric [Discharge] : no discharge [Pain] : no pain [Itching] : no itching

## 2020-12-29 NOTE — ASSESSMENT
[Patient Optimized for Surgery] : Patient optimized for surgery [No Further Testing Recommended] : no further testing recommended [As per surgery] : as per surgery [FreeTextEntry4] : 80 y.o M with PMH of chronic back pain and hepatic steatosis presents for pre-op clearance. RCRI score of 0. Patient is medically optimized for low risk procedure

## 2021-02-22 ENCOUNTER — APPOINTMENT (OUTPATIENT)
Dept: OPHTHALMOLOGY | Facility: CLINIC | Age: 81
End: 2021-02-22

## 2021-03-10 ENCOUNTER — RESULT CHARGE (OUTPATIENT)
Age: 81
End: 2021-03-10

## 2021-03-11 ENCOUNTER — LABORATORY RESULT (OUTPATIENT)
Age: 81
End: 2021-03-11

## 2021-03-11 ENCOUNTER — OUTPATIENT (OUTPATIENT)
Dept: OUTPATIENT SERVICES | Facility: HOSPITAL | Age: 81
LOS: 1 days | End: 2021-03-11
Payer: COMMERCIAL

## 2021-03-11 ENCOUNTER — APPOINTMENT (OUTPATIENT)
Dept: INTERNAL MEDICINE | Facility: CLINIC | Age: 81
End: 2021-03-11
Payer: MEDICARE

## 2021-03-11 ENCOUNTER — NON-APPOINTMENT (OUTPATIENT)
Age: 81
End: 2021-03-11

## 2021-03-11 VITALS
SYSTOLIC BLOOD PRESSURE: 130 MMHG | DIASTOLIC BLOOD PRESSURE: 70 MMHG | HEART RATE: 65 BPM | BODY MASS INDEX: 35.87 KG/M2 | WEIGHT: 190 LBS | RESPIRATION RATE: 16 BRPM | OXYGEN SATURATION: 95 % | HEIGHT: 61 IN

## 2021-03-11 DIAGNOSIS — B35.1 TINEA UNGUIUM: ICD-10-CM

## 2021-03-11 DIAGNOSIS — I10 ESSENTIAL (PRIMARY) HYPERTENSION: ICD-10-CM

## 2021-03-11 DIAGNOSIS — H25.099 OTHER AGE-RELATED INCIPIENT CATARACT, UNSPECIFIED EYE: ICD-10-CM

## 2021-03-11 DIAGNOSIS — Z01.818 ENCOUNTER FOR OTHER PREPROCEDURAL EXAMINATION: ICD-10-CM

## 2021-03-11 PROCEDURE — G0463: CPT | Mod: 25

## 2021-03-11 PROCEDURE — 93005 ELECTROCARDIOGRAM TRACING: CPT

## 2021-03-11 PROCEDURE — 99214 OFFICE O/P EST MOD 30 MIN: CPT | Mod: GC

## 2021-03-11 PROCEDURE — 36415 COLL VENOUS BLD VENIPUNCTURE: CPT

## 2021-03-11 PROCEDURE — 83036 HEMOGLOBIN GLYCOSYLATED A1C: CPT

## 2021-03-11 PROCEDURE — 85027 COMPLETE CBC AUTOMATED: CPT

## 2021-03-11 RX ORDER — SULFAMETHOXAZOLE AND TRIMETHOPRIM 800; 160 MG/1; MG/1
800-160 TABLET ORAL
Qty: 14 | Refills: 0 | Status: DISCONTINUED | COMMUNITY
Start: 2018-12-03 | End: 2021-03-11

## 2021-03-11 RX ORDER — TAMSULOSIN HYDROCHLORIDE 0.4 MG/1
0.4 CAPSULE ORAL
Qty: 30 | Refills: 1 | Status: DISCONTINUED | COMMUNITY
Start: 2018-12-18 | End: 2021-03-11

## 2021-03-12 PROBLEM — B35.1 ONYCHOMYCOSIS: Status: ACTIVE | Noted: 2021-03-12

## 2021-03-12 PROBLEM — Z01.818 PREOP TESTING: Status: ACTIVE | Noted: 2020-09-24

## 2021-03-12 PROBLEM — H25.099 OTHER AGE-RELATED INCIPIENT CATARACT: Status: ACTIVE | Noted: 2020-12-29

## 2021-03-12 LAB
A1C WITH ESTIMATED AVERAGE GLUCOSE RESULT: 5.9 % — HIGH (ref 4–5.6)
ESTIMATED AVERAGE GLUCOSE: 123 MG/DL — HIGH (ref 68–114)
HCT VFR BLD CALC: 45.4 % — SIGNIFICANT CHANGE UP (ref 39–50)
HGB BLD-MCNC: 14.9 G/DL — SIGNIFICANT CHANGE UP (ref 13–17)
MCHC RBC-ENTMCNC: 32.3 PG — SIGNIFICANT CHANGE UP (ref 27–34)
MCHC RBC-ENTMCNC: 32.8 GM/DL — SIGNIFICANT CHANGE UP (ref 32–36)
MCV RBC AUTO: 98.5 FL — SIGNIFICANT CHANGE UP (ref 80–100)
PLATELET # BLD AUTO: 175 K/UL — SIGNIFICANT CHANGE UP (ref 150–400)
RBC # BLD: 4.61 M/UL — SIGNIFICANT CHANGE UP (ref 4.2–5.8)
RBC # FLD: 12.7 % — SIGNIFICANT CHANGE UP (ref 10.3–14.5)
WBC # BLD: 6.07 K/UL — SIGNIFICANT CHANGE UP (ref 3.8–10.5)
WBC # FLD AUTO: 6.07 K/UL — SIGNIFICANT CHANGE UP (ref 3.8–10.5)

## 2021-03-15 ENCOUNTER — APPOINTMENT (OUTPATIENT)
Dept: OPHTHALMOLOGY | Facility: AMBULATORY SURGERY CENTER | Age: 81
End: 2021-03-15
Payer: MEDICARE

## 2021-03-15 PROCEDURE — 66982 XCAPSL CTRC RMVL CPLX WO ECP: CPT | Mod: RT

## 2021-03-15 NOTE — PLAN
[FreeTextEntry1] : 80 yo M Lithuanian-speaking w/ hx of obesity, hepatic steatosis, and sebaceous back cyst (resolved s/p abx), s/p L eye cataract surgery (Sept 2020), now presenting for pre-op risk assessment of R eye cataract surgery.\par \par #Pre-op Risk Assessment\par - RCRI score of 0\par - EKG today shows sinus bradycardia w/o signs of ischemia or conduction disorder, unchanged from last EKG in Sept 2020\par - pt is medically optimized for low risk procedure pending results of repeat CBC and HbA1c (drawn today)\par - devorah-op care per opthalmology\par \par RTC in 6 months for annual CPE, or sooner PRN\par d/w Dr. Moreno\par

## 2021-03-15 NOTE — REVIEW OF SYSTEMS
[Fever] : no fever [Chills] : no chills [Fatigue] : no fatigue [Discharge] : no discharge [Pain] : no pain [Earache] : no earache [Hearing Loss] : no hearing loss [Sore Throat] : no sore throat [Chest Pain] : no chest pain [Palpitations] : no palpitations [Lower Ext Edema] : no lower extremity edema [Shortness Of Breath] : no shortness of breath [Wheezing] : no wheezing [Dyspnea on Exertion] : not dyspnea on exertion [Abdominal Pain] : no abdominal pain [Nausea] : no nausea [Constipation] : no constipation [Diarrhea] : no diarrhea [Vomiting] : no vomiting [Melena] : no melena [Dysuria] : no dysuria [Hematuria] : no hematuria [Muscle Pain] : no muscle pain [Muscle Weakness] : no muscle weakness [Back Pain] : no back pain [Joint Swelling] : no joint swelling [Itching] : no itching [Skin Rash] : no skin rash [Headache] : no headache [Dizziness] : no dizziness [Easy Bleeding] : no easy bleeding [Easy Bruising] : no easy bruising [FreeTextEntry3] : +R eye blurriness from cataract [FreeTextEntry6] : +chronic mild cough

## 2021-03-15 NOTE — HISTORY OF PRESENT ILLNESS
[No Pertinent Cardiac History] : no history of aortic stenosis, atrial fibrillation, coronary artery disease, recent myocardial infarction, or implantable device/pacemaker [No Pertinent Pulmonary History] : no history of asthma, COPD, sleep apnea, or smoking [No Adverse Anesthesia Reaction] : no adverse anesthesia reaction in self or family member [(Patient denies any chest pain, claudication, dyspnea on exertion, orthopnea, palpitations or syncope)] : Patient denies any chest pain, claudication, dyspnea on exertion, orthopnea, palpitations or syncope [Good (7-10 METs)] : Good (7-10 METs) [Family Member] : family member [Patient Declined  Services] : - None: Patient declined  services [Chronic Anticoagulation] : no chronic anticoagulation [Chronic Kidney Disease] : no chronic kidney disease [Diabetes] : no diabetes [FreeTextEntry1] : Right eye cataract surgery [FreeTextEntry2] : 3/15/2021 [FreeTextEntry4] : 82 yo M Danish-speaking w/ hx of obesity, hepatic steatosis, and sebaceous back cyst (resolved s/p abx) presenting for pre-op risk assessment of R eye cataract surgery. Pt is accompanied by his daughter, who is providing translation for him. Pt was last in clinic in Sept 2020 for pre-op risk assessment of L eye cataract surgery. Since then he reports no change in health aside from the clearer vision in his L eye after the surgery, which is why he now would like the surgery done for the R eye as well. Pt reports he is not on any medications at this time. Pt reports a chronic mild cough that he has had for many years. Otherwise, denies f/c/n/v, CP, SOB, abdominal pain, dysuria, hematuria, hematochezia, melena, sick contacts, or recent travel. [FreeTextEntry7] : EKG (9/23/20): Sinus madeline (HR 53)\par EKG (3/11/21): Sinus madeline (HR 59) [FreeTextEntry3] : Pt opted for accompanying daughter to translate for him. [TWNoteComboBox1] : [place] Islander

## 2021-03-15 NOTE — ASSESSMENT
[High Risk Surgery - Intraperitoneal, Intrathoracic or Supringuinal Vascular Procedures] : High Risk Surgery - Intraperitoneal, Intrathoracic or Supringuinal Vascular Procedures - No (0) [Ischemic Heart Disease] : Ischemic Heart Disease - No (0) [Congestive Heart Failure] : Congestive Heart Failure - No (0) [Prior Cerebrovascular Accident or TIA] : Prior Cerebrovascular Accident or TIA - No (0) [Creatinine >= 2mg/dL (1 Point)] : Creatinine >= 2mg/dL - No (0) [Insulin-dependent Diabetic (1 Point)] : Insulin-dependent Diabetic - No (0) [0] : 0 , RCRI Class: I, Risk of Post-Op Cardiac Complications: 3.9%, 95% CI for Risk Estimate: 2.8% - 5.4% [Patient Optimized for Surgery] : Patient optimized for surgery [As per surgery] : as per surgery [Other: _____] : [unfilled] [No Further Testing Recommended] : no further testing recommended [FreeTextEntry4] : \par

## 2021-03-15 NOTE — END OF VISIT
[] : Resident [FreeTextEntry3] : Pt with bibasilar crackles which clear with cough, likely due to atelectasis. EKG reviewed and WNL. Agree with pre-op assessment. No further testing indicated.

## 2021-03-15 NOTE — PHYSICAL EXAM
[Normal Sclera/Conjunctiva] : normal sclera/conjunctiva [EOMI] : extraocular movements intact [Normal Outer Ear/Nose] : the outer ears and nose were normal in appearance [Normal Oropharynx] : the oropharynx was normal [No Respiratory Distress] : no respiratory distress  [No Accessory Muscle Use] : no accessory muscle use [Pedal Pulses Present] : the pedal pulses are present [No Edema] : there was no peripheral edema [No Extremity Clubbing/Cyanosis] : no extremity clubbing/cyanosis [Soft] : abdomen soft [Non Tender] : non-tender [No Masses] : no abdominal mass palpated [Normal Bowel Sounds] : normal bowel sounds [Normal] : no joint swelling and grossly normal strength and tone [Coordination Grossly Intact] : coordination grossly intact [No Focal Deficits] : no focal deficits [Normal Gait] : normal gait [de-identified] : mild bibasilar velcro-like crackles that improved with cough/deep breaths, otherwise clear to auscultation b/l [de-identified] : obese abdomen [de-identified] : multiple onychomycotic fingernails with hyperpigmentation (chronic per patient)

## 2021-03-16 ENCOUNTER — APPOINTMENT (OUTPATIENT)
Dept: OPHTHALMOLOGY | Facility: CLINIC | Age: 81
End: 2021-03-16

## 2021-03-18 DIAGNOSIS — Z01.818 ENCOUNTER FOR OTHER PREPROCEDURAL EXAMINATION: ICD-10-CM

## 2021-03-18 DIAGNOSIS — H25.099 OTHER AGE-RELATED INCIPIENT CATARACT, UNSPECIFIED EYE: ICD-10-CM

## 2021-03-23 ENCOUNTER — APPOINTMENT (OUTPATIENT)
Dept: OPHTHALMOLOGY | Facility: CLINIC | Age: 81
End: 2021-03-23

## 2021-04-19 ENCOUNTER — APPOINTMENT (OUTPATIENT)
Dept: OPHTHALMOLOGY | Facility: CLINIC | Age: 81
End: 2021-04-19

## 2021-08-19 ENCOUNTER — LABORATORY RESULT (OUTPATIENT)
Age: 81
End: 2021-08-19

## 2021-08-20 ENCOUNTER — APPOINTMENT (OUTPATIENT)
Dept: INTERNAL MEDICINE | Facility: CLINIC | Age: 81
End: 2021-08-20
Payer: MEDICARE

## 2021-08-20 ENCOUNTER — OUTPATIENT (OUTPATIENT)
Dept: OUTPATIENT SERVICES | Facility: HOSPITAL | Age: 81
LOS: 1 days | End: 2021-08-20
Payer: COMMERCIAL

## 2021-08-20 VITALS
BODY MASS INDEX: 34.74 KG/M2 | OXYGEN SATURATION: 97 % | DIASTOLIC BLOOD PRESSURE: 70 MMHG | WEIGHT: 184 LBS | SYSTOLIC BLOOD PRESSURE: 140 MMHG | HEART RATE: 73 BPM | HEIGHT: 61 IN

## 2021-08-20 DIAGNOSIS — I10 ESSENTIAL (PRIMARY) HYPERTENSION: ICD-10-CM

## 2021-08-20 DIAGNOSIS — K76.0 FATTY (CHANGE OF) LIVER, NOT ELSEWHERE CLASSIFIED: ICD-10-CM

## 2021-08-20 DIAGNOSIS — E65 LOCALIZED ADIPOSITY: ICD-10-CM

## 2021-08-20 DIAGNOSIS — M75.01 ADHESIVE CAPSULITIS OF RIGHT SHOULDER: ICD-10-CM

## 2021-08-20 PROCEDURE — 99213 OFFICE O/P EST LOW 20 MIN: CPT | Mod: GE

## 2021-08-20 PROCEDURE — 85027 COMPLETE CBC AUTOMATED: CPT

## 2021-08-20 PROCEDURE — G0463: CPT

## 2021-08-20 PROCEDURE — 84080 ASSAY ALKALINE PHOSPHATASES: CPT

## 2021-08-20 PROCEDURE — 80053 COMPREHEN METABOLIC PANEL: CPT

## 2021-08-21 LAB
ALBUMIN SERPL ELPH-MCNC: 5 G/DL — SIGNIFICANT CHANGE UP (ref 3.3–5)
ALP SERPL-CCNC: 125 U/L — HIGH (ref 40–120)
ALT FLD-CCNC: 51 U/L — HIGH (ref 10–45)
ANION GAP SERPL CALC-SCNC: 17 MMOL/L — SIGNIFICANT CHANGE UP (ref 5–17)
AST SERPL-CCNC: 47 U/L — HIGH (ref 10–40)
BILIRUB SERPL-MCNC: 0.9 MG/DL — SIGNIFICANT CHANGE UP (ref 0.2–1.2)
BUN SERPL-MCNC: 28 MG/DL — HIGH (ref 7–23)
CALCIUM SERPL-MCNC: 9.3 MG/DL — SIGNIFICANT CHANGE UP (ref 8.4–10.5)
CHLORIDE SERPL-SCNC: 103 MMOL/L — SIGNIFICANT CHANGE UP (ref 96–108)
CO2 SERPL-SCNC: 22 MMOL/L — SIGNIFICANT CHANGE UP (ref 22–31)
CREAT SERPL-MCNC: 1.21 MG/DL — SIGNIFICANT CHANGE UP (ref 0.5–1.3)
GLUCOSE SERPL-MCNC: 105 MG/DL — HIGH (ref 70–99)
HCT VFR BLD CALC: 43.5 % — SIGNIFICANT CHANGE UP (ref 39–50)
HGB BLD-MCNC: 13.9 G/DL — SIGNIFICANT CHANGE UP (ref 13–17)
MCHC RBC-ENTMCNC: 32 GM/DL — SIGNIFICANT CHANGE UP (ref 32–36)
MCHC RBC-ENTMCNC: 32.9 PG — SIGNIFICANT CHANGE UP (ref 27–34)
MCV RBC AUTO: 103.1 FL — HIGH (ref 80–100)
PLATELET # BLD AUTO: 198 K/UL — SIGNIFICANT CHANGE UP (ref 150–400)
POTASSIUM SERPL-MCNC: 4.5 MMOL/L — SIGNIFICANT CHANGE UP (ref 3.5–5.3)
POTASSIUM SERPL-SCNC: 4.5 MMOL/L — SIGNIFICANT CHANGE UP (ref 3.5–5.3)
PROT SERPL-MCNC: 7.4 G/DL — SIGNIFICANT CHANGE UP (ref 6–8.3)
RBC # BLD: 4.22 M/UL — SIGNIFICANT CHANGE UP (ref 4.2–5.8)
RBC # FLD: 13.2 % — SIGNIFICANT CHANGE UP (ref 10.3–14.5)
SODIUM SERPL-SCNC: 143 MMOL/L — SIGNIFICANT CHANGE UP (ref 135–145)
WBC # BLD: 6.68 K/UL — SIGNIFICANT CHANGE UP (ref 3.8–10.5)
WBC # FLD AUTO: 6.68 K/UL — SIGNIFICANT CHANGE UP (ref 3.8–10.5)

## 2021-08-23 ENCOUNTER — NON-APPOINTMENT (OUTPATIENT)
Age: 81
End: 2021-08-23

## 2021-08-24 LAB
ALP BONE SERPL-MCNC: 43 % — SIGNIFICANT CHANGE UP (ref 12–68)
ALP FLD-CCNC: 119 IU/L — SIGNIFICANT CHANGE UP (ref 48–121)
ALP INTEST CFR SERPL: 10 % — SIGNIFICANT CHANGE UP (ref 0–18)
ALP LIVER SERPL-CCNC: 47 % — SIGNIFICANT CHANGE UP (ref 13–88)

## 2021-08-27 ENCOUNTER — APPOINTMENT (OUTPATIENT)
Dept: RADIOLOGY | Facility: IMAGING CENTER | Age: 81
End: 2021-08-27
Payer: MEDICARE

## 2021-08-27 ENCOUNTER — OUTPATIENT (OUTPATIENT)
Dept: OUTPATIENT SERVICES | Facility: HOSPITAL | Age: 81
LOS: 1 days | End: 2021-08-27
Payer: MEDICARE

## 2021-08-27 DIAGNOSIS — Z00.00 ENCOUNTER FOR GENERAL ADULT MEDICAL EXAMINATION WITHOUT ABNORMAL FINDINGS: ICD-10-CM

## 2021-08-27 PROCEDURE — 77080 DXA BONE DENSITY AXIAL: CPT

## 2021-08-27 PROCEDURE — 77080 DXA BONE DENSITY AXIAL: CPT | Mod: 26

## 2021-08-30 ENCOUNTER — NON-APPOINTMENT (OUTPATIENT)
Age: 81
End: 2021-08-30

## 2021-09-20 NOTE — HISTORY OF PRESENT ILLNESS
[Family Member] : family member [FreeTextEntry1] : cpe [de-identified] : 82 yo M German-speaking w/ hx of obesity, hepatic steatosis, and sebaceous back cyst (resolved s/p abx) presenting for cpe\par \par Hepatic steatosis\par -f/u CBC CMP, alkphos isoenzyme \par -Mediterranean diet counselled\par \par b/l arm pain - Began since the pandemic, starts in the morning and feels like a burning sensation, improves with tylenol and work, does not radiate. 6/10. \par -physical therapy referral\par \par HCM\par -will get zoster vaccine\par -Dexa scan\par -dast\par -ADL/IADL\par -healthcare proxy\par -COVID vaccine in April 6 and May 7, Moderna\par -no urinary retention symptoms no PSA recommended at this time

## 2021-09-20 NOTE — PHYSICAL EXAM
[Normal] : normal sclera/conjunctiva, pupils are equal, round and reactive to light and extraocular movements are intact [No Lymphadenopathy] : no lymphadenopathy [Supple] : supple [No Respiratory Distress] : no respiratory distress  [Thyroid Normal, No Nodules] : the thyroid was normal and there were no nodules present [No Accessory Muscle Use] : no accessory muscle use [Clear to Auscultation] : lungs were clear to auscultation bilaterally [Normal Rate] : normal rate  [Regular Rhythm] : with a regular rhythm [Normal S1, S2] : normal S1 and S2 [Soft] : abdomen soft [Non Tender] : non-tender [Non-distended] : non-distended [No Masses] : no abdominal mass palpated [No HSM] : no HSM [Normal Bowel Sounds] : normal bowel sounds [Normal Affect] : the affect was normal [Normal Insight/Judgement] : insight and judgment were intact [No Joint Swelling] : no joint swelling [de-identified] : limited range of motion b/l with abduction, unable to abduct arm b/l above his head

## 2021-09-20 NOTE — ASSESSMENT
[FreeTextEntry1] : #HCM\par -Dexa scan referral\par -COVID vaccine in April 6 and May 7, Moderna\par -got flu shot in 2020\par -no urinary retention symptoms no PSA recommended at this time\par -RTC 1year

## 2021-09-20 NOTE — HEALTH RISK ASSESSMENT
[Yes] : Yes [2 - 4 times a month (2 pts)] : 2-4 times a month (2 points) [1 or 2 (0 pts)] : 1 or 2 (0 points) [Never (0 pts)] : Never (0 points) [No] : In the past 12 months have you used drugs other than those required for medical reasons? No [No falls in past year] : Patient reported no falls in the past year [0] : 2) Feeling down, depressed, or hopeless: Not at all (0) [PHQ-2 Negative - No further assessment needed] : PHQ-2 Negative - No further assessment needed [With Family] : lives with family [Employed] : employed [] :  [Feels Safe at Home] : Feels safe at home [Fully functional (bathing, dressing, toileting, transferring, walking, feeding)] : Fully functional (bathing, dressing, toileting, transferring, walking, feeding) [Fully functional (using the telephone, shopping, preparing meals, housekeeping, doing laundry, using] : Fully functional and needs no help or supervision to perform IADLs (using the telephone, shopping, preparing meals, housekeeping, doing laundry, using transportation, managing medications and managing finances) [With Patient/Caregiver] : , with patient/caregiver [Designated Healthcare Proxy] : Designated healthcare proxy [Name: ___] : Health Care Proxy's Name: [unfilled]  [Relationship: ___] : Relationship: [unfilled] [I will adhere to the patient's wishes.] : I will adhere to the patient's wishes. [Smoke Detector] : smoke detector [Carbon Monoxide Detector] : carbon monoxide detector [Seat Belt] :  uses seat belt [Sunscreen] : uses sunscreen [] : No [Audit-CScore] : 2 [[id number]] : 0 [Change in mental status noted] : No change in mental status noted [Language] : denies difficulty with language [Behavior] : denies difficulty with behavior [Reasoning] : denies difficulty with reasoning [Reports changes in hearing] : Reports no changes in hearing [Reports changes in vision] : Reports no changes in vision [Reports changes in dental health] : Reports no changes in dental health [Guns at Home] : no guns at home [AdvancecareDate] : 08/20/21 [FreeTextEntry4] : Discussed regarding heatlh care proxy, as well as brought up DNR and DNI. Patient will discuss with family members regarding DNR and DNI status

## 2022-05-13 ENCOUNTER — APPOINTMENT (OUTPATIENT)
Dept: OPHTHALMOLOGY | Facility: CLINIC | Age: 82
End: 2022-05-13
Payer: MEDICARE

## 2022-05-13 ENCOUNTER — NON-APPOINTMENT (OUTPATIENT)
Age: 82
End: 2022-05-13

## 2022-05-13 PROCEDURE — 92133 CPTRZD OPH DX IMG PST SGM ON: CPT

## 2022-05-13 PROCEDURE — 92014 COMPRE OPH EXAM EST PT 1/>: CPT

## 2022-09-09 ENCOUNTER — OUTPATIENT (OUTPATIENT)
Dept: OUTPATIENT SERVICES | Facility: HOSPITAL | Age: 82
LOS: 1 days | End: 2022-09-09
Payer: COMMERCIAL

## 2022-09-09 ENCOUNTER — APPOINTMENT (OUTPATIENT)
Dept: INTERNAL MEDICINE | Facility: CLINIC | Age: 82
End: 2022-09-09

## 2022-09-09 VITALS
DIASTOLIC BLOOD PRESSURE: 58 MMHG | SYSTOLIC BLOOD PRESSURE: 122 MMHG | OXYGEN SATURATION: 96 % | HEART RATE: 59 BPM | BODY MASS INDEX: 33.23 KG/M2 | HEIGHT: 61 IN | WEIGHT: 176 LBS

## 2022-09-09 DIAGNOSIS — M75.01 ADHESIVE CAPSULITIS OF RIGHT SHOULDER: ICD-10-CM

## 2022-09-09 DIAGNOSIS — I10 ESSENTIAL (PRIMARY) HYPERTENSION: ICD-10-CM

## 2022-09-09 DIAGNOSIS — M75.02 ADHESIVE CAPSULITIS OF RIGHT SHOULDER: ICD-10-CM

## 2022-09-09 DIAGNOSIS — Z23 ENCOUNTER FOR IMMUNIZATION: ICD-10-CM

## 2022-09-09 PROCEDURE — 80061 LIPID PANEL: CPT

## 2022-09-09 PROCEDURE — 80053 COMPREHEN METABOLIC PANEL: CPT

## 2022-09-09 PROCEDURE — G0463: CPT

## 2022-09-09 PROCEDURE — 99213 OFFICE O/P EST LOW 20 MIN: CPT | Mod: GE

## 2022-09-09 PROCEDURE — 83036 HEMOGLOBIN GLYCOSYLATED A1C: CPT

## 2022-09-09 NOTE — INTERPRETER SERVICES
[Patient Declined  Services] : - None: Patient declined  services [FreeTextEntry3] : Beth daughter is offering translations and refused

## 2022-09-09 NOTE — PHYSICAL EXAM
[No Acute Distress] : no acute distress [Well Nourished] : well nourished [Well Developed] : well developed [Well-Appearing] : well-appearing [Normal Outer Ear/Nose] : the outer ears and nose were normal in appearance [No JVD] : no jugular venous distention [No Respiratory Distress] : no respiratory distress  [No Accessory Muscle Use] : no accessory muscle use [Clear to Auscultation] : lungs were clear to auscultation bilaterally [Normal Rate] : normal rate  [Regular Rhythm] : with a regular rhythm [Normal S1, S2] : normal S1 and S2 [Soft] : abdomen soft [Non Tender] : non-tender [Non-distended] : non-distended [Normal Bowel Sounds] : normal bowel sounds

## 2022-09-09 NOTE — HISTORY OF PRESENT ILLNESS
[FreeTextEntry1] : f/u [de-identified] : 81 yo M Macedonian-speaking w/ hx of obesity, hepatic steatosis, and sebaceous back cyst (resolved s/p abx) presenting for f/u and stated he was here for physical and flu shot\par \par Overall patient has been feeling well and no acute complaints.\par #Adhesive capsulitis\par -both arms have much improved, patient now has full range of motion with his shoulders\par -patient will continue with his stretching exercises demonstrated in prior visit\par \par #HCM\par CBC, CMP, A1c\par Flu shot obtained this visit\par Shingles ordered\par \par

## 2022-09-09 NOTE — ASSESSMENT
[FreeTextEntry1] : 81 yo M Iraqi-speaking w/ hx of obesity, hepatic steatosis, and sebaceous back cyst (resolved s/p abx) presenting for f/u and stated he was here for physical and flu shot\par \par #HCM\par CBC, CMP, A1c, lipids\par \par RTC for next CPE(patient will need annual as this visit was booked as an RPA instead).\par \par d/w Dr. Galvan\par \par

## 2022-09-12 LAB
ALBUMIN SERPL ELPH-MCNC: 4.3 G/DL
ALP BLD-CCNC: 116 U/L
ALT SERPL-CCNC: 40 U/L
ANION GAP SERPL CALC-SCNC: 9 MMOL/L
AST SERPL-CCNC: 37 U/L
BILIRUB SERPL-MCNC: 0.7 MG/DL
BUN SERPL-MCNC: 23 MG/DL
CALCIUM SERPL-MCNC: 9.8 MG/DL
CHLORIDE SERPL-SCNC: 102 MMOL/L
CHOLEST SERPL-MCNC: 161 MG/DL
CO2 SERPL-SCNC: 28 MMOL/L
CREAT SERPL-MCNC: 0.92 MG/DL
EGFR: 83 ML/MIN/1.73M2
ESTIMATED AVERAGE GLUCOSE: 126 MG/DL
GLUCOSE SERPL-MCNC: 91 MG/DL
HBA1C MFR BLD HPLC: 6 %
HDLC SERPL-MCNC: 44 MG/DL
LDLC SERPL CALC-MCNC: 80 MG/DL
NONHDLC SERPL-MCNC: 117 MG/DL
POTASSIUM SERPL-SCNC: 4.8 MMOL/L
PROT SERPL-MCNC: 6.9 G/DL
SODIUM SERPL-SCNC: 140 MMOL/L
TRIGL SERPL-MCNC: 186 MG/DL

## 2022-09-13 DIAGNOSIS — M75.01 ADHESIVE CAPSULITIS OF RIGHT SHOULDER: ICD-10-CM

## 2022-09-13 DIAGNOSIS — K76.0 FATTY (CHANGE OF) LIVER, NOT ELSEWHERE CLASSIFIED: ICD-10-CM

## 2022-10-27 ENCOUNTER — NON-APPOINTMENT (OUTPATIENT)
Age: 82
End: 2022-10-27

## 2022-10-27 ENCOUNTER — APPOINTMENT (OUTPATIENT)
Dept: OPHTHALMOLOGY | Facility: CLINIC | Age: 82
End: 2022-10-27

## 2022-10-27 PROCEDURE — 92012 INTRM OPH EXAM EST PATIENT: CPT

## 2022-10-27 PROCEDURE — 92083 EXTENDED VISUAL FIELD XM: CPT

## 2022-12-21 ENCOUNTER — NON-APPOINTMENT (OUTPATIENT)
Age: 82
End: 2022-12-21

## 2022-12-21 ENCOUNTER — APPOINTMENT (OUTPATIENT)
Dept: OPHTHALMOLOGY | Facility: CLINIC | Age: 82
End: 2022-12-21

## 2022-12-21 PROCEDURE — 76514 ECHO EXAM OF EYE THICKNESS: CPT

## 2022-12-21 PROCEDURE — 92012 INTRM OPH EXAM EST PATIENT: CPT

## 2022-12-21 PROCEDURE — 92020 GONIOSCOPY: CPT

## 2023-03-30 ENCOUNTER — EMERGENCY (EMERGENCY)
Facility: HOSPITAL | Age: 83
LOS: 1 days | Discharge: HOME | End: 2023-03-30
Attending: EMERGENCY MEDICINE
Payer: COMMERCIAL

## 2023-03-30 VITALS
OXYGEN SATURATION: 98 % | HEART RATE: 67 BPM | DIASTOLIC BLOOD PRESSURE: 76 MMHG | TEMPERATURE: 98 F | RESPIRATION RATE: 18 BRPM | SYSTOLIC BLOOD PRESSURE: 143 MMHG

## 2023-03-30 VITALS
DIASTOLIC BLOOD PRESSURE: 76 MMHG | SYSTOLIC BLOOD PRESSURE: 146 MMHG | RESPIRATION RATE: 16 BRPM | HEART RATE: 58 BPM | OXYGEN SATURATION: 100 %

## 2023-03-30 LAB
ALBUMIN SERPL ELPH-MCNC: 4.3 G/DL — SIGNIFICANT CHANGE UP (ref 3.3–5)
ALP SERPL-CCNC: 129 U/L — HIGH (ref 40–120)
ALT FLD-CCNC: 38 U/L — SIGNIFICANT CHANGE UP (ref 10–45)
APTT BLD: 30 SEC — SIGNIFICANT CHANGE UP (ref 27.5–35.5)
AST SERPL-CCNC: 43 U/L — HIGH (ref 10–40)
BASE EXCESS BLDV CALC-SCNC: 1.1 MMOL/L — SIGNIFICANT CHANGE UP (ref -2–3)
BASOPHILS # BLD AUTO: 0.03 K/UL — SIGNIFICANT CHANGE UP (ref 0–0.2)
BASOPHILS NFR BLD AUTO: 0.4 % — SIGNIFICANT CHANGE UP (ref 0–2)
BILIRUB SERPL-MCNC: 0.5 MG/DL — SIGNIFICANT CHANGE UP (ref 0.2–1.2)
BLD GP AB SCN SERPL QL: NEGATIVE — SIGNIFICANT CHANGE UP
BUN SERPL-MCNC: 16 MG/DL — SIGNIFICANT CHANGE UP (ref 7–23)
CA-I SERPL-SCNC: 1.17 MMOL/L — SIGNIFICANT CHANGE UP (ref 1.15–1.33)
CALCIUM SERPL-MCNC: 8.9 MG/DL — SIGNIFICANT CHANGE UP (ref 8.4–10.5)
CHLORIDE BLDV-SCNC: 104 MMOL/L — SIGNIFICANT CHANGE UP (ref 96–108)
CHLORIDE SERPL-SCNC: 101 MMOL/L — SIGNIFICANT CHANGE UP (ref 96–108)
CO2 BLDV-SCNC: 31 MMOL/L — HIGH (ref 22–26)
CO2 SERPL-SCNC: 26 MMOL/L — SIGNIFICANT CHANGE UP (ref 22–31)
CREAT SERPL-MCNC: 0.9 MG/DL — SIGNIFICANT CHANGE UP (ref 0.5–1.3)
EGFR: 85 ML/MIN/1.73M2 — SIGNIFICANT CHANGE UP
EOSINOPHIL # BLD AUTO: 0.14 K/UL — SIGNIFICANT CHANGE UP (ref 0–0.5)
EOSINOPHIL NFR BLD AUTO: 1.9 % — SIGNIFICANT CHANGE UP (ref 0–6)
ETHANOL SERPL-MCNC: 192 MG/DL — HIGH (ref 0–10)
FLUAV AG NPH QL: SIGNIFICANT CHANGE UP
FLUBV AG NPH QL: SIGNIFICANT CHANGE UP
GAS PNL BLDV: 136 MMOL/L — SIGNIFICANT CHANGE UP (ref 136–145)
GAS PNL BLDV: SIGNIFICANT CHANGE UP
GLUCOSE BLDV-MCNC: 104 MG/DL — HIGH (ref 70–99)
GLUCOSE SERPL-MCNC: 104 MG/DL — HIGH (ref 70–99)
HCO3 BLDV-SCNC: 29 MMOL/L — SIGNIFICANT CHANGE UP (ref 22–29)
HCT VFR BLD CALC: 41.5 % — SIGNIFICANT CHANGE UP (ref 39–50)
HCT VFR BLDA CALC: 42 % — SIGNIFICANT CHANGE UP (ref 39–51)
HGB BLD CALC-MCNC: 14.1 G/DL — SIGNIFICANT CHANGE UP (ref 12.6–17.4)
HGB BLD-MCNC: 13.9 G/DL — SIGNIFICANT CHANGE UP (ref 13–17)
IMM GRANULOCYTES NFR BLD AUTO: 0.3 % — SIGNIFICANT CHANGE UP (ref 0–0.9)
INR BLD: 0.97 RATIO — SIGNIFICANT CHANGE UP (ref 0.88–1.16)
LACTATE BLDV-MCNC: 1.9 MMOL/L — SIGNIFICANT CHANGE UP (ref 0.5–2)
LIDOCAIN IGE QN: 30 U/L — SIGNIFICANT CHANGE UP (ref 7–60)
LYMPHOCYTES # BLD AUTO: 2.35 K/UL — SIGNIFICANT CHANGE UP (ref 1–3.3)
LYMPHOCYTES # BLD AUTO: 32.2 % — SIGNIFICANT CHANGE UP (ref 13–44)
MCHC RBC-ENTMCNC: 32.8 PG — SIGNIFICANT CHANGE UP (ref 27–34)
MCHC RBC-ENTMCNC: 33.5 GM/DL — SIGNIFICANT CHANGE UP (ref 32–36)
MCV RBC AUTO: 97.9 FL — SIGNIFICANT CHANGE UP (ref 80–100)
MONOCYTES # BLD AUTO: 0.51 K/UL — SIGNIFICANT CHANGE UP (ref 0–0.9)
MONOCYTES NFR BLD AUTO: 7 % — SIGNIFICANT CHANGE UP (ref 2–14)
NEUTROPHILS # BLD AUTO: 4.24 K/UL — SIGNIFICANT CHANGE UP (ref 1.8–7.4)
NEUTROPHILS NFR BLD AUTO: 58.2 % — SIGNIFICANT CHANGE UP (ref 43–77)
NRBC # BLD: 0 /100 WBCS — SIGNIFICANT CHANGE UP (ref 0–0)
PCO2 BLDV: 59 MMHG — HIGH (ref 42–55)
PH BLDV: 7.3 — LOW (ref 7.32–7.43)
PLATELET # BLD AUTO: 205 K/UL — SIGNIFICANT CHANGE UP (ref 150–400)
PO2 BLDV: 31 MMHG — SIGNIFICANT CHANGE UP (ref 25–45)
POTASSIUM BLDV-SCNC: 4.3 MMOL/L — SIGNIFICANT CHANGE UP (ref 3.5–5.1)
POTASSIUM SERPL-MCNC: 4.3 MMOL/L — SIGNIFICANT CHANGE UP (ref 3.5–5.3)
POTASSIUM SERPL-SCNC: 4.3 MMOL/L — SIGNIFICANT CHANGE UP (ref 3.5–5.3)
PROT SERPL-MCNC: 7.4 G/DL — SIGNIFICANT CHANGE UP (ref 6–8.3)
PROTHROM AB SERPL-ACNC: 11.2 SEC — SIGNIFICANT CHANGE UP (ref 10.5–13.4)
RBC # BLD: 4.24 M/UL — SIGNIFICANT CHANGE UP (ref 4.2–5.8)
RBC # FLD: 12.8 % — SIGNIFICANT CHANGE UP (ref 10.3–14.5)
RH IG SCN BLD-IMP: POSITIVE — SIGNIFICANT CHANGE UP
RSV RNA NPH QL NAA+NON-PROBE: SIGNIFICANT CHANGE UP
SAO2 % BLDV: 48.4 % — LOW (ref 67–88)
SARS-COV-2 RNA SPEC QL NAA+PROBE: SIGNIFICANT CHANGE UP
SODIUM SERPL-SCNC: 138 MMOL/L — SIGNIFICANT CHANGE UP (ref 135–145)
WBC # BLD: 7.29 K/UL — SIGNIFICANT CHANGE UP (ref 3.8–10.5)
WBC # FLD AUTO: 7.29 K/UL — SIGNIFICANT CHANGE UP (ref 3.8–10.5)

## 2023-03-30 PROCEDURE — 12001 RPR S/N/AX/GEN/TRNK 2.5CM/<: CPT

## 2023-03-30 PROCEDURE — 80053 COMPREHEN METABOLIC PANEL: CPT

## 2023-03-30 PROCEDURE — 70450 CT HEAD/BRAIN W/O DYE: CPT | Mod: MA

## 2023-03-30 PROCEDURE — 72125 CT NECK SPINE W/O DYE: CPT | Mod: 26,MA

## 2023-03-30 PROCEDURE — 87637 SARSCOV2&INF A&B&RSV AMP PRB: CPT

## 2023-03-30 PROCEDURE — 82435 ASSAY OF BLOOD CHLORIDE: CPT

## 2023-03-30 PROCEDURE — 86901 BLOOD TYPING SEROLOGIC RH(D): CPT

## 2023-03-30 PROCEDURE — 83690 ASSAY OF LIPASE: CPT

## 2023-03-30 PROCEDURE — 85610 PROTHROMBIN TIME: CPT

## 2023-03-30 PROCEDURE — 82803 BLOOD GASES ANY COMBINATION: CPT

## 2023-03-30 PROCEDURE — 70450 CT HEAD/BRAIN W/O DYE: CPT | Mod: 26,MA

## 2023-03-30 PROCEDURE — 83605 ASSAY OF LACTIC ACID: CPT

## 2023-03-30 PROCEDURE — 72125 CT NECK SPINE W/O DYE: CPT | Mod: MA

## 2023-03-30 PROCEDURE — 71045 X-RAY EXAM CHEST 1 VIEW: CPT

## 2023-03-30 PROCEDURE — 36415 COLL VENOUS BLD VENIPUNCTURE: CPT

## 2023-03-30 PROCEDURE — 74177 CT ABD & PELVIS W/CONTRAST: CPT | Mod: 26,MA

## 2023-03-30 PROCEDURE — 80307 DRUG TEST PRSMV CHEM ANLYZR: CPT

## 2023-03-30 PROCEDURE — 96374 THER/PROPH/DIAG INJ IV PUSH: CPT | Mod: XU

## 2023-03-30 PROCEDURE — 90715 TDAP VACCINE 7 YRS/> IM: CPT

## 2023-03-30 PROCEDURE — 85018 HEMOGLOBIN: CPT

## 2023-03-30 PROCEDURE — 72170 X-RAY EXAM OF PELVIS: CPT | Mod: 26

## 2023-03-30 PROCEDURE — 85014 HEMATOCRIT: CPT

## 2023-03-30 PROCEDURE — 84132 ASSAY OF SERUM POTASSIUM: CPT

## 2023-03-30 PROCEDURE — 85025 COMPLETE CBC W/AUTO DIFF WBC: CPT

## 2023-03-30 PROCEDURE — 85730 THROMBOPLASTIN TIME PARTIAL: CPT

## 2023-03-30 PROCEDURE — 74177 CT ABD & PELVIS W/CONTRAST: CPT | Mod: MA

## 2023-03-30 PROCEDURE — 72170 X-RAY EXAM OF PELVIS: CPT

## 2023-03-30 PROCEDURE — 86900 BLOOD TYPING SEROLOGIC ABO: CPT

## 2023-03-30 PROCEDURE — 99284 EMERGENCY DEPT VISIT MOD MDM: CPT | Mod: 25

## 2023-03-30 PROCEDURE — 72132 CT LUMBAR SPINE W/DYE: CPT | Mod: 26,MA

## 2023-03-30 PROCEDURE — 71260 CT THORAX DX C+: CPT | Mod: 26,MA

## 2023-03-30 PROCEDURE — 71260 CT THORAX DX C+: CPT | Mod: MA

## 2023-03-30 PROCEDURE — 84295 ASSAY OF SERUM SODIUM: CPT

## 2023-03-30 PROCEDURE — 90471 IMMUNIZATION ADMIN: CPT

## 2023-03-30 PROCEDURE — 86850 RBC ANTIBODY SCREEN: CPT

## 2023-03-30 PROCEDURE — 71045 X-RAY EXAM CHEST 1 VIEW: CPT | Mod: 26

## 2023-03-30 PROCEDURE — 99285 EMERGENCY DEPT VISIT HI MDM: CPT | Mod: 25

## 2023-03-30 PROCEDURE — 82330 ASSAY OF CALCIUM: CPT

## 2023-03-30 PROCEDURE — 82947 ASSAY GLUCOSE BLOOD QUANT: CPT

## 2023-03-30 RX ORDER — SODIUM CHLORIDE 9 MG/ML
500 INJECTION INTRAMUSCULAR; INTRAVENOUS; SUBCUTANEOUS ONCE
Refills: 0 | Status: COMPLETED | OUTPATIENT
Start: 2023-03-30 | End: 2023-03-30

## 2023-03-30 RX ORDER — ACETAMINOPHEN 500 MG
1000 TABLET ORAL ONCE
Refills: 0 | Status: COMPLETED | OUTPATIENT
Start: 2023-03-30 | End: 2023-03-30

## 2023-03-30 RX ORDER — TETANUS TOXOID, REDUCED DIPHTHERIA TOXOID AND ACELLULAR PERTUSSIS VACCINE, ADSORBED 5; 2.5; 8; 8; 2.5 [IU]/.5ML; [IU]/.5ML; UG/.5ML; UG/.5ML; UG/.5ML
0.5 SUSPENSION INTRAMUSCULAR ONCE
Refills: 0 | Status: COMPLETED | OUTPATIENT
Start: 2023-03-30 | End: 2023-03-30

## 2023-03-30 RX ADMIN — SODIUM CHLORIDE 500 MILLILITER(S): 9 INJECTION INTRAMUSCULAR; INTRAVENOUS; SUBCUTANEOUS at 20:00

## 2023-03-30 RX ADMIN — TETANUS TOXOID, REDUCED DIPHTHERIA TOXOID AND ACELLULAR PERTUSSIS VACCINE, ADSORBED 0.5 MILLILITER(S): 5; 2.5; 8; 8; 2.5 SUSPENSION INTRAMUSCULAR at 20:51

## 2023-03-30 RX ADMIN — Medication 400 MILLIGRAM(S): at 20:00

## 2023-03-30 NOTE — ED PROVIDER NOTE - ATTENDING APP SHARED VISIT CONTRIBUTION OF CARE
I performed a history and physical exam of the patient and discussed their management with the resident and /or advanced care provider. I reviewed the resident and /or ACP's note and agree with the documented findings and plan of care. My medical decision making and observations are found above.  Lungs clear abd distended, rt upper pain but not changed with palpation [Negative] : Genitourinary

## 2023-03-30 NOTE — ED PROVIDER NOTE - NSFOLLOWUPINSTRUCTIONS_ED_ALL_ED_FT
Please follow up with your primary care doctor within 1 week.  *Bring all printed lab/test results to your appointment(s).*    Take acetaminophen 500-1000mg every 6 hrs as needed for pain. DO NOT EXCEED 4000mg DAILY.    Return to the ED in 10 days for staple removal. Keep area dry for 24hrs then clean with warm water and soap. Apply topical antibiotic ointment daily.    Rest. Stay well hydrated.    Return to the ED for worsening pain, dizziness, loss of consciousness, or any other concerns. Please follow up with your primary care doctor within 1 week.  *Bring all printed lab/test results to your appointment(s).*    Take ibuprofen 400-600mg every 6 hrs as needed for pain. Take with food     Return to the ED in 10 days for staple removal. Keep area dry for 24hrs then clean with warm water and soap. Apply topical antibiotic ointment daily.    Rest. Stay well hydrated.    Return to the ED for worsening pain, dizziness, loss of consciousness, or any other concerns.  ---------------------------------------------------------------------------------------------  Genoveva un seguimiento con villanueva médico de atención primaria dentro de 1 semana.  *Lleve todos los resultados impresos de laboratorio/pruebas a villanueva(s) shelly(s).*    [place] ibuprofeno 400-600 mg cada 6 horas según sea necesario para el dolor. Yoan con alimentos     Regrese al departamento de emergencias en 10 días para la extracción de grapas. Mantenga el área seca dolly 24 horas y luego limpie con agua tibia y jabón. Aplique ungüento antibiótico tópico diariamente.    Descanse. Manténgase ann hidratado.    Regrese a la arline de emergencias por empeoramiento del dolor, mareos, pérdida del conocimiento o cualquier otra preocupación.

## 2023-03-30 NOTE — ED PROVIDER NOTE - PATIENT PORTAL LINK FT
You can access the FollowMyHealth Patient Portal offered by [facility] by registering at the following website: [email or web address]. By joining [facility]’s FollowMyHealth portal, you will also be able to view your health information using other applications (apps) compatible with our system.

## 2023-03-30 NOTE — ED ADULT NURSE NOTE - OBJECTIVE STATEMENT
82 yo male no PMH, A&Ox3, BIBEMS s/p fall.  Pt reports was getting out of work van, missed step fell out and hit head on curb, +LOC x 1 minute, witnessed by son who is at bedside. Denies dizziness. pt c/o pain to back of head/right side chest, RUQ.  pt admits to drinking 5-6 beers today. Breathing even and unlabored, PERRL, abdomen soft tender RUQ, able to speak in clear sentences, pulses, motor, sensory intact, laceration to back of head 3cm, bleeding at site, no pedal edema. Pt denies chest pain, palpitations, shortness of breath, headache, visual disturbances, numbness/tingling, fever, chills, diaphoresis,  nausea, vomiting, constipation, diarrhea, or urinary symptoms.

## 2023-03-30 NOTE — ED PROVIDER NOTE - CARE PLAN
1 Principal Discharge DX:	Occipital scalp laceration  Secondary Diagnosis:	Abdominal pain  Secondary Diagnosis:	Pain of neck with recent traumatic injury

## 2023-03-30 NOTE — ED ADULT NURSE NOTE - NSIMPLEMENTINTERV_GEN_ALL_ED
Implemented All Universal Safety Interventions:  [place] to call system. Call bell, personal items and telephone within reach. Instruct patient to call for assistance. Room bathroom lighting operational. Non-slip footwear when patient is off stretcher. Physically safe environment: no spills, clutter or unnecessary equipment. Stretcher in lowest position, wheels locked, appropriate side rails in place.

## 2023-03-30 NOTE — ED PROVIDER NOTE - SKIN, MLM
1cm occipital scalp laceration w/minimal active bleeding, no visible FB. Remaining skin exam clean, dry, intact

## 2023-03-30 NOTE — ED PROVIDER NOTE - CLINICAL SUMMARY MEDICAL DECISION MAKING FREE TEXT BOX
Jarvis: 82yo who presents with head laceration, rt upper abd pain. + intox. Would scan head and neck for trauma. would scan abd for rt upper pain. labs needed.

## 2023-03-30 NOTE — ED PROVIDER NOTE - OBJECTIVE STATEMENT
83 male no reported past medical history BIBA accompanied by son for head injury.  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  Patient denies LOC however is intoxicated reporting drinking 5–6 beers today.  Reports RUQ/mid abdominal pain for 1 week, denies worse before or after the fall.  9/10 at this time, nonradiating with no other associated symptoms.  Not on AC.  Reports right-sided chest wall discomfort with palpation, back pain.  Denies CP, SOB, palpitations, dizziness, LOC, N/V, urinary symptoms.  Denies history of alcohol withdrawal symptoms.  Vietnamese translation with son/ALEKS Salomon at bedside

## 2023-03-30 NOTE — ED PROVIDER NOTE - CARDIAC, MLM
Normal rate, regular rhythm.  Heart sounds S1, S2.  No murmurs, rubs or gallops. Mid-axillary mid-to-lower right chest wall ttp without clear deformity.

## 2023-03-31 RX ORDER — FAMOTIDINE 10 MG/ML
1 INJECTION INTRAVENOUS
Qty: 30 | Refills: 0
Start: 2023-03-31

## 2023-05-22 NOTE — ED ADULT NURSE NOTE - NEURO GAIT
steady Cyclophosphamide Pregnancy And Lactation Text: This medication is Pregnancy Category D and it isn't considered safe during pregnancy. This medication is excreted in breast milk.

## 2023-06-15 ENCOUNTER — INPATIENT (INPATIENT)
Facility: HOSPITAL | Age: 83
LOS: 4 days | Discharge: HOME | DRG: 25 | End: 2023-06-20
Attending: NEUROLOGICAL SURGERY | Admitting: NEUROLOGICAL SURGERY
Payer: MEDICARE

## 2023-06-15 ENCOUNTER — TRANSCRIPTION ENCOUNTER (OUTPATIENT)
Age: 83
End: 2023-06-15

## 2023-06-15 VITALS
SYSTOLIC BLOOD PRESSURE: 152 MMHG | OXYGEN SATURATION: 97 % | TEMPERATURE: 98 F | RESPIRATION RATE: 16 BRPM | DIASTOLIC BLOOD PRESSURE: 78 MMHG | WEIGHT: 195.11 LBS | HEART RATE: 54 BPM | HEIGHT: 60 IN

## 2023-06-15 LAB
ALBUMIN SERPL ELPH-MCNC: 4.4 G/DL — SIGNIFICANT CHANGE UP (ref 3.3–5)
ALP SERPL-CCNC: 132 U/L — HIGH (ref 40–120)
ALT FLD-CCNC: 33 U/L — SIGNIFICANT CHANGE UP (ref 10–45)
AMMONIA BLD-MCNC: 44 UMOL/L — SIGNIFICANT CHANGE UP (ref 11–55)
ANION GAP SERPL CALC-SCNC: 13 MMOL/L — SIGNIFICANT CHANGE UP (ref 5–17)
APTT BLD: 27.6 SEC — SIGNIFICANT CHANGE UP (ref 27.5–35.5)
AST SERPL-CCNC: 30 U/L — SIGNIFICANT CHANGE UP (ref 10–40)
BASE EXCESS BLDV CALC-SCNC: 1.8 MMOL/L — SIGNIFICANT CHANGE UP (ref -2–3)
BASOPHILS # BLD AUTO: 0.02 K/UL — SIGNIFICANT CHANGE UP (ref 0–0.2)
BASOPHILS NFR BLD AUTO: 0.3 % — SIGNIFICANT CHANGE UP (ref 0–2)
BILIRUB SERPL-MCNC: 0.7 MG/DL — SIGNIFICANT CHANGE UP (ref 0.2–1.2)
BUN SERPL-MCNC: 19 MG/DL — SIGNIFICANT CHANGE UP (ref 7–23)
CA-I SERPL-SCNC: 1.25 MMOL/L — SIGNIFICANT CHANGE UP (ref 1.15–1.33)
CALCIUM SERPL-MCNC: 9.6 MG/DL — SIGNIFICANT CHANGE UP (ref 8.4–10.5)
CHLORIDE BLDV-SCNC: 102 MMOL/L — SIGNIFICANT CHANGE UP (ref 96–108)
CHLORIDE SERPL-SCNC: 101 MMOL/L — SIGNIFICANT CHANGE UP (ref 96–108)
CO2 BLDV-SCNC: 30 MMOL/L — HIGH (ref 22–26)
CO2 SERPL-SCNC: 23 MMOL/L — SIGNIFICANT CHANGE UP (ref 22–31)
CREAT SERPL-MCNC: 0.89 MG/DL — SIGNIFICANT CHANGE UP (ref 0.5–1.3)
EGFR: 85 ML/MIN/1.73M2 — SIGNIFICANT CHANGE UP
EOSINOPHIL # BLD AUTO: 0.17 K/UL — SIGNIFICANT CHANGE UP (ref 0–0.5)
EOSINOPHIL NFR BLD AUTO: 2.7 % — SIGNIFICANT CHANGE UP (ref 0–6)
ETHANOL SERPL-MCNC: <10 MG/DL — SIGNIFICANT CHANGE UP (ref 0–10)
GAS PNL BLDV: 132 MMOL/L — LOW (ref 136–145)
GAS PNL BLDV: SIGNIFICANT CHANGE UP
GLUCOSE BLDV-MCNC: 89 MG/DL — SIGNIFICANT CHANGE UP (ref 70–99)
GLUCOSE SERPL-MCNC: 94 MG/DL — SIGNIFICANT CHANGE UP (ref 70–99)
HCO3 BLDV-SCNC: 28 MMOL/L — SIGNIFICANT CHANGE UP (ref 22–29)
HCT VFR BLD CALC: 43.5 % — SIGNIFICANT CHANGE UP (ref 39–50)
HCT VFR BLDA CALC: 44 % — SIGNIFICANT CHANGE UP (ref 39–51)
HGB BLD CALC-MCNC: 14.5 G/DL — SIGNIFICANT CHANGE UP (ref 12.6–17.4)
HGB BLD-MCNC: 14.3 G/DL — SIGNIFICANT CHANGE UP (ref 13–17)
IMM GRANULOCYTES NFR BLD AUTO: 0.5 % — SIGNIFICANT CHANGE UP (ref 0–0.9)
INR BLD: 0.99 RATIO — SIGNIFICANT CHANGE UP (ref 0.88–1.16)
LACTATE BLDV-MCNC: 1.3 MMOL/L — SIGNIFICANT CHANGE UP (ref 0.5–2)
LYMPHOCYTES # BLD AUTO: 2 K/UL — SIGNIFICANT CHANGE UP (ref 1–3.3)
LYMPHOCYTES # BLD AUTO: 31.7 % — SIGNIFICANT CHANGE UP (ref 13–44)
MCHC RBC-ENTMCNC: 32.3 PG — SIGNIFICANT CHANGE UP (ref 27–34)
MCHC RBC-ENTMCNC: 32.9 GM/DL — SIGNIFICANT CHANGE UP (ref 32–36)
MCV RBC AUTO: 98.2 FL — SIGNIFICANT CHANGE UP (ref 80–100)
MONOCYTES # BLD AUTO: 0.53 K/UL — SIGNIFICANT CHANGE UP (ref 0–0.9)
MONOCYTES NFR BLD AUTO: 8.4 % — SIGNIFICANT CHANGE UP (ref 2–14)
NEUTROPHILS # BLD AUTO: 3.55 K/UL — SIGNIFICANT CHANGE UP (ref 1.8–7.4)
NEUTROPHILS NFR BLD AUTO: 56.4 % — SIGNIFICANT CHANGE UP (ref 43–77)
NRBC # BLD: 0 /100 WBCS — SIGNIFICANT CHANGE UP (ref 0–0)
PCO2 BLDV: 50 MMHG — SIGNIFICANT CHANGE UP (ref 42–55)
PH BLDV: 7.36 — SIGNIFICANT CHANGE UP (ref 7.32–7.43)
PLATELET # BLD AUTO: 204 K/UL — SIGNIFICANT CHANGE UP (ref 150–400)
PO2 BLDV: 45 MMHG — SIGNIFICANT CHANGE UP (ref 25–45)
POTASSIUM BLDV-SCNC: 4.1 MMOL/L — SIGNIFICANT CHANGE UP (ref 3.5–5.1)
POTASSIUM SERPL-MCNC: 4.1 MMOL/L — SIGNIFICANT CHANGE UP (ref 3.5–5.3)
POTASSIUM SERPL-SCNC: 4.1 MMOL/L — SIGNIFICANT CHANGE UP (ref 3.5–5.3)
PROT SERPL-MCNC: 7.6 G/DL — SIGNIFICANT CHANGE UP (ref 6–8.3)
PROTHROM AB SERPL-ACNC: 11.5 SEC — SIGNIFICANT CHANGE UP (ref 10.5–13.4)
RBC # BLD: 4.43 M/UL — SIGNIFICANT CHANGE UP (ref 4.2–5.8)
RBC # FLD: 12.9 % — SIGNIFICANT CHANGE UP (ref 10.3–14.5)
SAO2 % BLDV: 75.9 % — SIGNIFICANT CHANGE UP (ref 67–88)
SODIUM SERPL-SCNC: 137 MMOL/L — SIGNIFICANT CHANGE UP (ref 135–145)
WBC # BLD: 6.3 K/UL — SIGNIFICANT CHANGE UP (ref 3.8–10.5)
WBC # FLD AUTO: 6.3 K/UL — SIGNIFICANT CHANGE UP (ref 3.8–10.5)

## 2023-06-15 PROCEDURE — 99285 EMERGENCY DEPT VISIT HI MDM: CPT

## 2023-06-15 PROCEDURE — 73523 X-RAY EXAM HIPS BI 5/> VIEWS: CPT | Mod: 26

## 2023-06-15 PROCEDURE — 71045 X-RAY EXAM CHEST 1 VIEW: CPT | Mod: 26

## 2023-06-15 NOTE — ED ADULT NURSE NOTE - NSFALLRISKINTERV_ED_ALL_ED

## 2023-06-15 NOTE — ED PROVIDER NOTE - OBJECTIVE STATEMENT
Attending note.  Patient was seen in room #39 to the right.  Macanese interpretation provided by granddaughter who provides additional history as well.  Patient had a fall yesterday and has increased confusion and weakness today.  Patient complaining of headache for the last week.  He has no fever, nausea, vomiting, diarrhea.  He has a history of fatty liver disease.  He takes no medications.  Has no allergies to medications.  Denies any abdominal pain or urinary symptoms.  He has no respiratory distress or coughing.

## 2023-06-15 NOTE — ED ADULT NURSE NOTE - OBJECTIVE STATEMENT
83 year old male coming in for altered mental status. Pt is Estonian speaking grand daughter at bedside able to translate for patient. As per family member over the past weak the patient has had increasing weakness and confusion. Pt had a fall yesterday. Patient has been complaining of a headache for one week. On arrival patient has a slight facial droop on the left side as well as weakness on the left side.     pt 83 year old male coming in for altered mental status. Pt is Bulgarian speaking grand daughter at bedside able to translate for patient. As per family member over the past weak the patient has had increasing weakness and confusion. Pt had a fall yesterday, did not hit head. Patient has been complaining of a headache for one week. On arrival patient has a slight facial droop on the left side as well as weakness on the left side. 83 year old male with PMHx of fatty liver coming in for altered mental status. Pt is Somali speaking grand daughter at bedside able to translate for patient. As per family member over the past weak the patient has had increasing weakness and confusion. Pt had a fall yesterday, states fell on L side and hit head, however did not lose consciousness. Patient has been complaining of a headache for one week, 8/10 in severity at present. On arrival patient has a slight facial droop on the left side as well as weakness on the left side. Granddaughter notes patient "has not been acting like himself" for the last week with several episodes of incontinence, atypical for patient. Denies dizziness, chest pain, abdominal pain, nausea, vomiting. AOx4 and speaking coherently. Breathing is unlabored, spontaneous, and symmetrical. Abdomen is round and distended, nontender. Non-pitting edema noted to bilateral lower extremities. Muscle strength decreased on L side of upper and lower extremities.

## 2023-06-15 NOTE — ED PROVIDER NOTE - DIFFERENTIAL DIAGNOSIS
Differential not limited to infection versus intracranial hemorrhage versus CVA versus alcohol intoxication versus hepatic encephalopathy Differential Diagnosis

## 2023-06-15 NOTE — ED ADULT TRIAGE NOTE - AS PAIN REST
----- Message from Leyda HUERTA PA-C sent at 5/2/2018  3:25 PM CDT -----  TSH thyroid test is normal - continue same dose levothyroxine  
Discussed with patient, Leyda Sutton's recommendations for recent lab results.  Patient's TSH is in the normal range.  Patient will continue her Levothyroxine dose as scheduled.  Patient verbalized understanding.  
4 (moderate pain)

## 2023-06-15 NOTE — ED PROVIDER NOTE - PHYSICAL EXAMINATION
Attending note.  Patient is alert and in no acute distress.  There is slight left facial asymmetry with weakness.  Lungs are clear and equal bilaterally.  Heart is regular rate and rhythm.  Abdomen is obese, soft, nontender nondistended.  There is trace ankle edema.  There is weakness in the left upper and lower extremity.

## 2023-06-15 NOTE — ED PROVIDER NOTE - CLINICAL SUMMARY MEDICAL DECISION MAKING FREE TEXT BOX
Please call [phone] to schedule Lexiscan      Attending note.  Patient with increased confusion and left facial droop and left hemiaplasia.  CT head, alcohol level, glucose level, ammonia level, troponin, urinalysis, basic labs.

## 2023-06-15 NOTE — ED PROVIDER NOTE - PROGRESS NOTE DETAILS
[facility] PGY 2 nsgx paged pt w [facility]. Neurosurgery informed and will see pt promptly. endorsed to me by kita. -Tito Mahajan MD- [facility] PGY 2 Neurosurgery aware

## 2023-06-15 NOTE — ED ADULT NURSE NOTE - ABDOMEN
I will START or STAY ON the medications listed below when I get home from the hospital:    aspirin 81 mg oral delayed release tablet  -- 1 tab(s) by mouth once a day  -- Indication: For CAD    lisinopril 2.5 mg oral tablet  -- 1 tab(s) by mouth once a day  -- Indication: For HTN    apixaban 2.5 mg oral tablet  -- 1 tab(s) by mouth every 12 hours  -- Indication: For A-Fib    meclizine 12.5 mg oral tablet  -- 1 tab(s) by mouth 3 times a day, As needed, Dizziness  -- Indication: For Vertigo    atorvastatin 10 mg oral tablet  -- 1 tab(s) by mouth once a day (at bedtime)  -- Indication: For Hyperlipidemia     metoprolol tartrate 50 mg oral tablet  -- 1 tab(s) by mouth once a day  -- Indication: For HTN    furosemide 20 mg oral tablet  -- 1 tab(s) by mouth once a day  -- Indication: For CHF    ferrous sulfate 325 mg (65 mg elemental iron) oral delayed release tablet  -- 1 tab(s) by mouth once a day  -- Indication: For Anemia    pantoprazole 40 mg oral delayed release tablet  -- 1 tab(s) by mouth once a day (before a meal)  -- Indication: For GERD    cholecalciferol oral tablet  -- 2000 unit(s) by mouth once a day  -- Indication: For Vitamin I will START or STAY ON the medications listed below when I get home from the hospital:    aspirin 81 mg oral delayed release tablet  -- 1 tab(s) by mouth once a day  -- Indication: For Coronary Artery Disease    lisinopril 2.5 mg oral tablet  -- 1 tab(s) by mouth once a day  -- Indication: For HTN    apixaban 2.5 mg oral tablet  -- 1 tab(s) by mouth every 12 hours  -- Indication: For Atrial fibrillation    meclizine 12.5 mg oral tablet  -- 1 tab(s) by mouth 3 times a day, As needed, Dizziness  -- Indication: For Dizziness    atorvastatin 10 mg oral tablet  -- 1 tab(s) by mouth once a day (at bedtime)  -- Indication: For Coronary Artery Disease    metoprolol succinate 50 mg oral capsule, extended release  -- 1 cap(s) by mouth once a day   -- Do not drink alcoholic beverages when taking this medication.  It is very important that you take or use this exactly as directed.  Do not skip doses or discontinue unless directed by your doctor.  May cause drowsiness or dizziness.  Obtain medical advice before taking any non-prescription drugs as some may affect the action of this medication.  Swallow whole.  Do not crush.  This drug may impair the ability to drive or operate machinery.  Use care until you become familiar with its effects.    -- Indication: For Hypertension    furosemide 20 mg oral tablet  -- 2 tab(s) by mouth once a day   -- Indication: For Hypertension    ferrous sulfate 325 mg (65 mg elemental iron) oral delayed release tablet  -- 1 tab(s) by mouth once a day  -- Indication: For Anemia    pantoprazole 40 mg oral delayed release tablet  -- 1 tab(s) by mouth once a day (before a meal)  -- Indication: For Gastritis    cholecalciferol oral tablet  -- 2000 unit(s) by mouth once a day  -- Indication: For Supplement nontender/distended/rounded

## 2023-06-16 ENCOUNTER — TRANSCRIPTION ENCOUNTER (OUTPATIENT)
Age: 83
End: 2023-06-16

## 2023-06-16 ENCOUNTER — APPOINTMENT (OUTPATIENT)
Dept: NEUROSURGERY | Facility: HOSPITAL | Age: 83
End: 2023-06-16

## 2023-06-16 DIAGNOSIS — I62.00 NONTRAUMATIC SUBDURAL HEMORRHAGE, UNSPECIFIED: ICD-10-CM

## 2023-06-16 LAB
A1C WITH ESTIMATED AVERAGE GLUCOSE RESULT: 5.7 % — HIGH (ref 4–5.6)
ANION GAP SERPL CALC-SCNC: 10 MMOL/L — SIGNIFICANT CHANGE UP (ref 5–17)
ANION GAP SERPL CALC-SCNC: 13 MMOL/L — SIGNIFICANT CHANGE UP (ref 5–17)
APPEARANCE UR: CLEAR — SIGNIFICANT CHANGE UP
APTT BLD: 28.5 SEC — SIGNIFICANT CHANGE UP (ref 27.5–35.5)
BACTERIA # UR AUTO: NEGATIVE — SIGNIFICANT CHANGE UP
BASOPHILS # BLD AUTO: 0 K/UL — SIGNIFICANT CHANGE UP (ref 0–0.2)
BASOPHILS # BLD AUTO: 0.02 K/UL — SIGNIFICANT CHANGE UP (ref 0–0.2)
BASOPHILS NFR BLD AUTO: 0 % — SIGNIFICANT CHANGE UP (ref 0–2)
BASOPHILS NFR BLD AUTO: 0.3 % — SIGNIFICANT CHANGE UP (ref 0–2)
BILIRUB UR-MCNC: NEGATIVE — SIGNIFICANT CHANGE UP
BLD GP AB SCN SERPL QL: NEGATIVE — SIGNIFICANT CHANGE UP
BUN SERPL-MCNC: 18 MG/DL — SIGNIFICANT CHANGE UP (ref 7–23)
BUN SERPL-MCNC: 18 MG/DL — SIGNIFICANT CHANGE UP (ref 7–23)
CALCIUM SERPL-MCNC: 8.8 MG/DL — SIGNIFICANT CHANGE UP (ref 8.4–10.5)
CALCIUM SERPL-MCNC: 9.5 MG/DL — SIGNIFICANT CHANGE UP (ref 8.4–10.5)
CHLORIDE SERPL-SCNC: 102 MMOL/L — SIGNIFICANT CHANGE UP (ref 96–108)
CHLORIDE SERPL-SCNC: 103 MMOL/L — SIGNIFICANT CHANGE UP (ref 96–108)
CO2 SERPL-SCNC: 22 MMOL/L — SIGNIFICANT CHANGE UP (ref 22–31)
CO2 SERPL-SCNC: 25 MMOL/L — SIGNIFICANT CHANGE UP (ref 22–31)
COLOR SPEC: SIGNIFICANT CHANGE UP
CREAT SERPL-MCNC: 0.88 MG/DL — SIGNIFICANT CHANGE UP (ref 0.5–1.3)
CREAT SERPL-MCNC: 0.91 MG/DL — SIGNIFICANT CHANGE UP (ref 0.5–1.3)
DIFF PNL FLD: NEGATIVE — SIGNIFICANT CHANGE UP
EGFR: 84 ML/MIN/1.73M2 — SIGNIFICANT CHANGE UP
EGFR: 85 ML/MIN/1.73M2 — SIGNIFICANT CHANGE UP
EOSINOPHIL # BLD AUTO: 0 K/UL — SIGNIFICANT CHANGE UP (ref 0–0.5)
EOSINOPHIL # BLD AUTO: 0.18 K/UL — SIGNIFICANT CHANGE UP (ref 0–0.5)
EOSINOPHIL NFR BLD AUTO: 0 % — SIGNIFICANT CHANGE UP (ref 0–6)
EOSINOPHIL NFR BLD AUTO: 2.9 % — SIGNIFICANT CHANGE UP (ref 0–6)
EPI CELLS # UR: 0 /HPF — SIGNIFICANT CHANGE UP
ESTIMATED AVERAGE GLUCOSE: 117 MG/DL — HIGH (ref 68–114)
GLUCOSE SERPL-MCNC: 160 MG/DL — HIGH (ref 70–99)
GLUCOSE SERPL-MCNC: 97 MG/DL — SIGNIFICANT CHANGE UP (ref 70–99)
GLUCOSE UR QL: NEGATIVE — SIGNIFICANT CHANGE UP
HCT VFR BLD CALC: 42.1 % — SIGNIFICANT CHANGE UP (ref 39–50)
HCT VFR BLD CALC: 43 % — SIGNIFICANT CHANGE UP (ref 39–50)
HGB BLD-MCNC: 14.2 G/DL — SIGNIFICANT CHANGE UP (ref 13–17)
HGB BLD-MCNC: 14.4 G/DL — SIGNIFICANT CHANGE UP (ref 13–17)
IMM GRANULOCYTES NFR BLD AUTO: 0.3 % — SIGNIFICANT CHANGE UP (ref 0–0.9)
IMM GRANULOCYTES NFR BLD AUTO: 0.4 % — SIGNIFICANT CHANGE UP (ref 0–0.9)
INR BLD: 1.04 RATIO — SIGNIFICANT CHANGE UP (ref 0.88–1.16)
KETONES UR-MCNC: NEGATIVE — SIGNIFICANT CHANGE UP
LEUKOCYTE ESTERASE UR-ACNC: NEGATIVE — SIGNIFICANT CHANGE UP
LYMPHOCYTES # BLD AUTO: 0.65 K/UL — LOW (ref 1–3.3)
LYMPHOCYTES # BLD AUTO: 1.8 K/UL — SIGNIFICANT CHANGE UP (ref 1–3.3)
LYMPHOCYTES # BLD AUTO: 28.8 % — SIGNIFICANT CHANGE UP (ref 13–44)
LYMPHOCYTES # BLD AUTO: 8.3 % — LOW (ref 13–44)
MAGNESIUM SERPL-MCNC: 2.1 MG/DL — SIGNIFICANT CHANGE UP (ref 1.6–2.6)
MAGNESIUM SERPL-MCNC: 2.2 MG/DL — SIGNIFICANT CHANGE UP (ref 1.6–2.6)
MCHC RBC-ENTMCNC: 32.3 PG — SIGNIFICANT CHANGE UP (ref 27–34)
MCHC RBC-ENTMCNC: 32.5 PG — SIGNIFICANT CHANGE UP (ref 27–34)
MCHC RBC-ENTMCNC: 33.5 GM/DL — SIGNIFICANT CHANGE UP (ref 32–36)
MCHC RBC-ENTMCNC: 33.7 GM/DL — SIGNIFICANT CHANGE UP (ref 32–36)
MCV RBC AUTO: 95.9 FL — SIGNIFICANT CHANGE UP (ref 80–100)
MCV RBC AUTO: 97.1 FL — SIGNIFICANT CHANGE UP (ref 80–100)
MONOCYTES # BLD AUTO: 0.09 K/UL — SIGNIFICANT CHANGE UP (ref 0–0.9)
MONOCYTES # BLD AUTO: 0.53 K/UL — SIGNIFICANT CHANGE UP (ref 0–0.9)
MONOCYTES NFR BLD AUTO: 1.2 % — LOW (ref 2–14)
MONOCYTES NFR BLD AUTO: 8.5 % — SIGNIFICANT CHANGE UP (ref 2–14)
NEUTROPHILS # BLD AUTO: 3.7 K/UL — SIGNIFICANT CHANGE UP (ref 1.8–7.4)
NEUTROPHILS # BLD AUTO: 7.03 K/UL — SIGNIFICANT CHANGE UP (ref 1.8–7.4)
NEUTROPHILS NFR BLD AUTO: 59.2 % — SIGNIFICANT CHANGE UP (ref 43–77)
NEUTROPHILS NFR BLD AUTO: 90.1 % — HIGH (ref 43–77)
NITRITE UR-MCNC: NEGATIVE — SIGNIFICANT CHANGE UP
NRBC # BLD: 0 /100 WBCS — SIGNIFICANT CHANGE UP (ref 0–0)
NRBC # BLD: 0 /100 WBCS — SIGNIFICANT CHANGE UP (ref 0–0)
PH UR: 7 — SIGNIFICANT CHANGE UP (ref 5–8)
PHOSPHATE SERPL-MCNC: 3.5 MG/DL — SIGNIFICANT CHANGE UP (ref 2.5–4.5)
PHOSPHATE SERPL-MCNC: 4.2 MG/DL — SIGNIFICANT CHANGE UP (ref 2.5–4.5)
PLATELET # BLD AUTO: 163 K/UL — SIGNIFICANT CHANGE UP (ref 150–400)
PLATELET # BLD AUTO: 217 K/UL — SIGNIFICANT CHANGE UP (ref 150–400)
POTASSIUM SERPL-MCNC: 4.2 MMOL/L — SIGNIFICANT CHANGE UP (ref 3.5–5.3)
POTASSIUM SERPL-MCNC: 4.3 MMOL/L — SIGNIFICANT CHANGE UP (ref 3.5–5.3)
POTASSIUM SERPL-SCNC: 4.2 MMOL/L — SIGNIFICANT CHANGE UP (ref 3.5–5.3)
POTASSIUM SERPL-SCNC: 4.3 MMOL/L — SIGNIFICANT CHANGE UP (ref 3.5–5.3)
PROT UR-MCNC: NEGATIVE — SIGNIFICANT CHANGE UP
PROTHROM AB SERPL-ACNC: 12.1 SEC — SIGNIFICANT CHANGE UP (ref 10.5–13.4)
RBC # BLD: 4.39 M/UL — SIGNIFICANT CHANGE UP (ref 4.2–5.8)
RBC # BLD: 4.43 M/UL — SIGNIFICANT CHANGE UP (ref 4.2–5.8)
RBC # FLD: 12.5 % — SIGNIFICANT CHANGE UP (ref 10.3–14.5)
RBC # FLD: 12.7 % — SIGNIFICANT CHANGE UP (ref 10.3–14.5)
RBC CASTS # UR COMP ASSIST: 1 /HPF — SIGNIFICANT CHANGE UP (ref 0–4)
RH IG SCN BLD-IMP: POSITIVE — SIGNIFICANT CHANGE UP
SODIUM SERPL-SCNC: 137 MMOL/L — SIGNIFICANT CHANGE UP (ref 135–145)
SODIUM SERPL-SCNC: 138 MMOL/L — SIGNIFICANT CHANGE UP (ref 135–145)
SP GR SPEC: 1.03 — HIGH (ref 1.01–1.02)
TRIGL SERPL-MCNC: 135 MG/DL — SIGNIFICANT CHANGE UP
UROBILINOGEN FLD QL: NEGATIVE — SIGNIFICANT CHANGE UP
WBC # BLD: 6.25 K/UL — SIGNIFICANT CHANGE UP (ref 3.8–10.5)
WBC # BLD: 7.8 K/UL — SIGNIFICANT CHANGE UP (ref 3.8–10.5)
WBC # FLD AUTO: 6.25 K/UL — SIGNIFICANT CHANGE UP (ref 3.8–10.5)
WBC # FLD AUTO: 7.8 K/UL — SIGNIFICANT CHANGE UP (ref 3.8–10.5)
WBC UR QL: 0 /HPF — SIGNIFICANT CHANGE UP (ref 0–5)

## 2023-06-16 PROCEDURE — 36227 PLACE CATH XTRNL CAROTID: CPT

## 2023-06-16 PROCEDURE — 36224 PLACE CATH CAROTD ART: CPT | Mod: 50

## 2023-06-16 PROCEDURE — 61624 TCAT PERM OCCLS/EMBOLJ CNS: CPT

## 2023-06-16 PROCEDURE — 70496 CT ANGIOGRAPHY HEAD: CPT | Mod: 26,MA

## 2023-06-16 PROCEDURE — 75898 FOLLOW-UP ANGIOGRAPHY: CPT | Mod: 26

## 2023-06-16 PROCEDURE — 99291 CRITICAL CARE FIRST HOUR: CPT

## 2023-06-16 PROCEDURE — 93970 EXTREMITY STUDY: CPT | Mod: 26

## 2023-06-16 PROCEDURE — 70450 CT HEAD/BRAIN W/O DYE: CPT | Mod: 26

## 2023-06-16 PROCEDURE — 36226 PLACE CATH VERTEBRAL ART: CPT

## 2023-06-16 PROCEDURE — 70450 CT HEAD/BRAIN W/O DYE: CPT | Mod: 26,59

## 2023-06-16 PROCEDURE — 72125 CT NECK SPINE W/O DYE: CPT | Mod: 26,MA

## 2023-06-16 PROCEDURE — 70498 CT ANGIOGRAPHY NECK: CPT | Mod: 26,MA

## 2023-06-16 PROCEDURE — 99223 1ST HOSP IP/OBS HIGH 75: CPT | Mod: GC

## 2023-06-16 PROCEDURE — 75894 X-RAYS TRANSCATH THERAPY: CPT | Mod: 26

## 2023-06-16 RX ORDER — LATANOPROST 0.05 MG/ML
1 SOLUTION/ DROPS OPHTHALMIC; TOPICAL AT BEDTIME
Refills: 0 | Status: DISCONTINUED | OUTPATIENT
Start: 2023-06-16 | End: 2023-06-20

## 2023-06-16 RX ORDER — SODIUM CHLORIDE 9 MG/ML
1000 INJECTION INTRAMUSCULAR; INTRAVENOUS; SUBCUTANEOUS
Refills: 0 | Status: DISCONTINUED | OUTPATIENT
Start: 2023-06-16 | End: 2023-06-16

## 2023-06-16 RX ORDER — CHLORHEXIDINE GLUCONATE 213 G/1000ML
1 SOLUTION TOPICAL DAILY
Refills: 0 | Status: DISCONTINUED | OUTPATIENT
Start: 2023-06-16 | End: 2023-06-19

## 2023-06-16 RX ORDER — LATANOPROST 0.05 MG/ML
1 SOLUTION/ DROPS OPHTHALMIC; TOPICAL AT BEDTIME
Refills: 0 | Status: DISCONTINUED | OUTPATIENT
Start: 2023-06-16 | End: 2023-06-16

## 2023-06-16 RX ORDER — FAMOTIDINE 10 MG/ML
20 INJECTION INTRAVENOUS DAILY
Refills: 0 | Status: DISCONTINUED | OUTPATIENT
Start: 2023-06-16 | End: 2023-06-20

## 2023-06-16 RX ORDER — LEVETIRACETAM 250 MG/1
500 TABLET, FILM COATED ORAL
Refills: 0 | Status: DISCONTINUED | OUTPATIENT
Start: 2023-06-17 | End: 2023-06-20

## 2023-06-16 RX ORDER — CHLORHEXIDINE GLUCONATE 213 G/1000ML
1 SOLUTION TOPICAL ONCE
Refills: 0 | Status: COMPLETED | OUTPATIENT
Start: 2023-06-16 | End: 2023-06-16

## 2023-06-16 RX ORDER — ACETAMINOPHEN 500 MG
1000 TABLET ORAL ONCE
Refills: 0 | Status: COMPLETED | OUTPATIENT
Start: 2023-06-16 | End: 2023-06-16

## 2023-06-16 RX ORDER — ACETAMINOPHEN 500 MG
650 TABLET ORAL EVERY 6 HOURS
Refills: 0 | Status: DISCONTINUED | OUTPATIENT
Start: 2023-06-16 | End: 2023-06-20

## 2023-06-16 RX ORDER — LEVETIRACETAM 250 MG/1
500 TABLET, FILM COATED ORAL EVERY 12 HOURS
Refills: 0 | Status: DISCONTINUED | OUTPATIENT
Start: 2023-06-16 | End: 2023-06-16

## 2023-06-16 RX ORDER — POLYETHYLENE GLYCOL 3350 17 G/17G
17 POWDER, FOR SOLUTION ORAL
Refills: 0 | Status: DISCONTINUED | OUTPATIENT
Start: 2023-06-16 | End: 2023-06-20

## 2023-06-16 RX ORDER — LEVETIRACETAM 250 MG/1
500 TABLET, FILM COATED ORAL
Refills: 0 | Status: DISCONTINUED | OUTPATIENT
Start: 2023-06-16 | End: 2023-06-16

## 2023-06-16 RX ORDER — SENNA PLUS 8.6 MG/1
2 TABLET ORAL AT BEDTIME
Refills: 0 | Status: DISCONTINUED | OUTPATIENT
Start: 2023-06-16 | End: 2023-06-20

## 2023-06-16 RX ADMIN — Medication 5 MILLIGRAM(S): at 22:30

## 2023-06-16 RX ADMIN — Medication 1000 MILLIGRAM(S): at 12:40

## 2023-06-16 RX ADMIN — CHLORHEXIDINE GLUCONATE 1 APPLICATION(S): 213 SOLUTION TOPICAL at 03:34

## 2023-06-16 RX ADMIN — LEVETIRACETAM 400 MILLIGRAM(S): 250 TABLET, FILM COATED ORAL at 18:18

## 2023-06-16 RX ADMIN — LEVETIRACETAM 400 MILLIGRAM(S): 250 TABLET, FILM COATED ORAL at 05:14

## 2023-06-16 RX ADMIN — Medication 400 MILLIGRAM(S): at 12:08

## 2023-06-16 RX ADMIN — Medication 400 MILLIGRAM(S): at 03:00

## 2023-06-16 RX ADMIN — POLYETHYLENE GLYCOL 3350 17 GRAM(S): 17 POWDER, FOR SOLUTION ORAL at 18:18

## 2023-06-16 RX ADMIN — Medication 1000 MILLIGRAM(S): at 03:30

## 2023-06-16 RX ADMIN — SODIUM CHLORIDE 75 MILLILITER(S): 9 INJECTION INTRAMUSCULAR; INTRAVENOUS; SUBCUTANEOUS at 03:33

## 2023-06-16 RX ADMIN — FAMOTIDINE 20 MILLIGRAM(S): 10 INJECTION INTRAVENOUS at 11:46

## 2023-06-16 RX ADMIN — LATANOPROST 1 DROP(S): 0.05 SOLUTION/ DROPS OPHTHALMIC; TOPICAL at 22:30

## 2023-06-16 RX ADMIN — SENNA PLUS 2 TABLET(S): 8.6 TABLET ORAL at 22:30

## 2023-06-16 RX ADMIN — CHLORHEXIDINE GLUCONATE 1 APPLICATION(S): 213 SOLUTION TOPICAL at 18:18

## 2023-06-16 NOTE — DISCHARGE NOTE NURSING/CASE MANAGEMENT/SOCIAL WORK - NSDCVIVACCINE_GEN_ALL_CORE_FT
Tdap; 30-Mar-2023 20:51; Umm Comer (RN); [employer]; [id number] (Exp. Date: 01-Jun-2024); IntraMuscular; Deltoid Right.; 0.5 milliLiter(s); VIS (VIS Published: 09-May-2013, VIS Presented: 30-Mar-2023);

## 2023-06-16 NOTE — DISCHARGE NOTE NURSING/CASE MANAGEMENT/SOCIAL WORK - PATIENT PORTAL LINK FT
You can access the FollowMyHealth Patient Portal offered by [facility] by registering at the following website: [email or web address]. By joining [employer]’s FollowMyHealth portal, you will also be able to view your health information using other applications (apps) compatible with our system.

## 2023-06-16 NOTE — PROGRESS NOTE ADULT - SUBJECTIVE AND OBJECTIVE BOX
Mr Hernandez is an 84 y/o male Kazakh speaking, brought to the ED today by his grand daughter.  Pt fell about 3 weeks ago and hit rt side of head and was seen at this ED and got sutures placed.  Today grand daughter noted pt with subtle AMS, left side weakness and pt just not himself and less conversant.  Pt took tylenol only for HA and is not on ASA or blood thinners.      ICU Vital Signs Last 24 Hrs  T(C): 36.6 (16 Jun 2023 15:45), Max: 36.9 (16 Jun 2023 02:38)  T(F): 97.9 (16 Jun 2023 15:45), Max: 98.4 (16 Jun 2023 02:38)  HR: 65 (16 Jun 2023 20:00) (52 - 75)  BP: 130/66 (16 Jun 2023 20:00) (114/66 - 169/76)  BP(mean): 87 (16 Jun 2023 20:00) (80 - 109)  ABP: --  ABP(mean): --  RR: 19 (16 Jun 2023 20:00) (11 - 19)  SpO2: 96% (16 Jun 2023 20:00) (95% - 100%)    O2 Parameters below as of 16 Jun 2023 19:00  Patient On (Oxygen Delivery Method): room air          Physical Exam:  EXAMINATION:  General: No acute distress  HEENT: Anicteric sclerae  Cardiac: [id number]  Lungs: Clear  Abdomen: Soft, non-tender, +BS  Extremities: left leg swollen, erythematous  Skin/Incision Site: Clean, dry and intact  Neurologic: AOx3, Pupil pin point, left facial, FC, Right side 5/5, LUE 3/5, LLE 4-/5    IMAGING/DATA: [x] Reviewed    acetaminophen     Tablet .. 650 milliGRAM(s) Oral every 6 hours PRN  bisacodyl 5 milliGRAM(s) Oral at bedtime  chlorhexidine 4% Liquid 1 Application(s) Topical daily  famotidine    Tablet 20 milliGRAM(s) Oral daily  latanoprost 0.005% Ophthalmic Solution 1 Drop(s) Both EYES at bedtime  levETIRAcetam  IVPB 500 milliGRAM(s) IV Intermittent every 12 hours  polyethylene glycol 3350 17 Gram(s) Oral two times a day  senna 2 Tablet(s) Oral at bedtime  sodium chloride 0.9%. 1000 milliLiter(s) IV Continuous <Continuous>                            14.4   6.25  )-----------( 163      ( 16 Jun 2023 03:09 )             43.0             06-16    137  |  102  |  18  ----------------------------<  97  4.3   |  25  |  0.88    Ca    9.5      16 Jun 2023 03:09  Phos  3.5     06-16  Mg     2.2     06-16    TPro  7.6  /  Alb  4.4  /  TBili  0.7  /  DBili  x   /  AST  30  /  ALT  33  /  AlkPhos  132<H>  06-15    DEVICES:   [] Restraints [] ET tube [] central line [] arterial line [] pinto [] NGT/OGT [] EVD [] LD [x] SD drain [] Trach [] PEG [] Chest Tube [] other:

## 2023-06-16 NOTE — CHART NOTE - NSCHARTNOTEFT_GEN_A_CORE
Interventional Neuro- Radiology   Procedure Note PA-C    Procedure: Selective Cerebral Angiography   Pre- Procedure Diagnosis:  Post- Procedure Diagnosis:    : Dr Joel Weaver  Fellow:   Physician Assistant: GENEISS Weiss Dr     Nurse:  Radiologic Tech:  Anesthesiologist:  Sheath:      I/Os: EBL less than 10cc  IV fluids:     cc Urine output     cc    Contrast Omnipaque 240            Vitals: BP         HR      Spo2     %          Preliminary Report:  Using a 5 Polish arrow 90cm sheath to the right groin under general anesthesia via left vertebral artery,  left internal carotid artery, left external carotid artery, right vertebral artery, right internal carotid artery, right external carotid artery a selective cerebral angiography was performed and demonstrated                       Official note to follow.  Patient tolerated procedure well, hemodynamically stable, no change in neurological status compared to baseline. Results discussed with neuro ICU team, patient and patient's family. Right groin sheath was removed, manual compression held to hemostasis for 20 minutes, no active bleeding, no hematoma, quick clot and safeguard balloon dressing applied at Interventional Neuro- Radiology   Procedure Note PA-C    Procedure: Selective Cerebral Angiography   Pre- Procedure Diagnosis:  Post- Procedure Diagnosis:    : Dr Joel Weaver  Fellow:    Dr Hemant Elias   Physician Assistant: GENESIS Weiss Dr     Nurse:  Radiologic Tech:  Anesthesiologist:  Sheath:      I/Os: EBL less than 10cc  IV fluids:     cc Urine output     cc    Contrast Omnipaque 240            Vitals: BP         HR      Spo2 100%          Preliminary Report:  Using a 5 Botswanan arrow 90cm sheath to the right groin under general anesthesia via left vertebral artery,  left internal carotid artery, left external carotid artery, right vertebral artery, right internal carotid artery, right external carotid artery a selective cerebral angiography was performed and demonstrated                       Official note to follow.  Patient tolerated procedure well, hemodynamically stable, no change in neurological status compared to baseline. Results discussed with neuro ICU team, patient and patient's family. Right groin sheath was removed, manual compression held to hemostasis for 20 minutes, no active bleeding, no hematoma, quick clot and safeguard balloon dressing applied at Interventional Neuro- Radiology   Procedure Note PA-C    Procedure: Selective Cerebral Angiography   Pre- Procedure Diagnosis:  Post- Procedure Diagnosis:    : Dr Joel Weaver  Fellow:    Dr Hemant Elias   Physician Assistant: GENESIS Weiss Dr     Nurse:                   Dara Moore RN  Radiologic Tech:   Santiago Ghotra LRT  Anesthesiologist:  Dr Jasmine   Sheath:      I/Os: EBL less than 10cc  IV fluids:     cc Urine output     cc    Contrast Omnipaque 240            Vitals: BP         HR      Spo2 100%          Preliminary Report:  Using a 5 Austrian arrow 90cm sheath to the right groin under general anesthesia via left vertebral artery,  left internal carotid artery, left external carotid artery, right vertebral artery, right internal carotid artery, right external carotid artery a selective cerebral angiography was performed and demonstrated                       Official note to follow.  Patient tolerated procedure well, hemodynamically stable, no change in neurological status compared to baseline. Results discussed with neuro ICU team, patient and patient's family. Right groin sheath was removed, manual compression held to hemostasis for 20 minutes, no active bleeding, no hematoma, quick clot and safeguard balloon dressing applied at Interventional Neuro- Radiology   Procedure Note PA-C    Procedure: Selective Cerebral Angiography   Pre- Procedure Diagnosis:  Post- Procedure Diagnosis:    : Dr Joel Weaver  Fellow:    Dr Hemant Elias   Physician Assistant: GENESIS Weiss Dr     Nurse:                   Dara Moore RN  Radiologic Tech:   Santiago Ghotra LRT  Anesthesiologist:   Dr Jasmine   Sheath:      I/Os: EBL less than 10cc  IV fluids:     cc Urine output     cc    Contrast Omnipaque 240            Vitals: BP         HR      Spo2 100%          Preliminary Report:  Using a 5 Anguillan arrow 90cm sheath to the right groin under general anesthesia via left vertebral artery,  left internal carotid artery, left external carotid artery, right vertebral artery, right internal carotid artery, right external carotid artery a selective cerebral angiography was performed and demonstrated                       Official note to follow.  Patient tolerated procedure well, hemodynamically stable, no change in neurological status compared to baseline. Results discussed with neuro ICU team, patient and patient's family. Right groin sheath was removed, manual compression held to hemostasis for 20 minutes, no active bleeding, no hematoma, quick clot and safeguard balloon dressing applied at Interventional Neuro- Radiology   Procedure Note PA-C    Procedure: Catheter Cerebral Angiogram   Pre- Procedure Diagnosis:  Post- Procedure Diagnosis:    : Dr Joel Weaver  Fellow:    Dr Hemant Elias   Physician Assistant: GENESIS Weiss Dr     Nurse:                   Dara Moore RN  Radiologic Tech:    Santiago Ghotra LRT  Anesthesiologist:   Dr Jasmine   Sheath:                 5 Nepali arrow 90cm sheath       I/Os: EBL less than 10cc  IV fluids:     cc Urine output     cc Contrast Omnipaque 240            Vitals: BP         HR      Spo2 100%          Preliminary Report:  Using a 5 Nepali arrow 90cm sheath to the right groin under general anesthesia a catheter cerebral angiogram was performed and demonstrated                       Official note to follow.  Patient tolerated procedure well, hemodynamically stable, no change in neurological status compared to baseline. Results discussed with neuro ICU team, patient and patient's family. Right groin sheath was removed, manual compression held to hemostasis for 20 minutes, no active bleeding, no hematoma, quick clot and safeguard balloon dressing applied at Interventional Neuro- Radiology   Procedure Note PA-C    Procedure: Catheter Cerebral Angiogram   Pre- Procedure Diagnosis:  Post- Procedure Diagnosis:    : Dr Joel Weaver  Fellow:    Dr Hemant Elias   Physician Assistant: GENESIS Weiss Dr     Nurse:                   Dara Moore RN  Radiologic Tech:   Santiago Ghotra LRT  Anesthesiologist:   Dr Jasmine   Sheath:                 5 Burkinan arrow 90cm sheath       I/Os: EBL less than 10cc  IV fluids:     cc Urine output     cc Contrast Omnipaque 240            Vitals: BP         HR      Spo2 100%          Preliminary Report:  Using a 5 Burkinan arrow 90cm sheath to the right groin under general anesthesia a catheter cerebral angiogram was performed and demonstrated                       Official note to follow.  Patient tolerated procedure well, hemodynamically stable, no change in neurological status compared to baseline. Results discussed with neuro ICU team, patient and patient's family. Right groin sheath was removed, manual compression held to hemostasis for 20 minutes, no active bleeding, no hematoma, quick clot and safeguard balloon dressing applied at Interventional Neuro- Radiology   Procedure Note PA-C    Procedure: Catheter Cerebral Angiogram   Pre- Procedure Diagnosis:  Post- Procedure Diagnosis:    : Dr Joel Weaver  Fellow:    Dr Hemant Elias   Physician Assistant: GENESIS Weiss Dr     Nurse:                   Dara Moore RN  Radiologic Tech:   Santiago Ghotra LRT  Anesthesiologist:   Dr Jasmine   Sheath:                 5 Kyrgyz arrow 90cm sheath       I/Os: EBL less than 10cc  IV fluids:     cc Urine output     cc Contrast Omnipaque 240            Vitals: /70        HR 59      Spo2 100%          Preliminary Report:  Using a 5 Kyrgyz arrow 90cm sheath to the right groin under general anesthesia a catheter cerebral angiogram was performed and demonstrated                       Official note to follow.  Patient tolerated procedure well, hemodynamically stable, no change in neurological status compared to baseline. Results discussed with neuro ICU team, patient and patient's family. Right groin sheath was removed, manual compression held to hemostasis for 20 minutes, no active bleeding, no hematoma, quick clot and safeguard balloon dressing applied at Interventional Neuro- Radiology   Procedure Note PA-C    Procedure: Catheter Cerebral Angiogram and embolization of right MMA with coils and micro=particles    Pre- Procedure Diagnosis: Right SDH  Post- Procedure Diagnosis:     : Dr Joel Weaver  Fellow:    Dr Hemant Elias   Physician Assistant: Flaca Bailey PA-C  Dr Kurt Velasquez     Nurse:                   Dara Moore RN  Radiologic Tech:   Santiago Ghotra LRT  Anesthesiologist:   Dr Jasmine   Sheath:                 5 Lao arrow 90cm sheath       I/Os: EBL less than 10cc  IV fluids:     cc Urine output     cc Contrast Omnipaque 240            Vitals: /70        HR 59      Spo2 100%          Preliminary Report:  Using a 5 Lao arrow 90cm sheath to the right groin under general anesthesia a catheter cerebral angiogram and embolization of right MMA was performed. Official note to follow.  Patient tolerated procedure well, hemodynamically stable, no change in neurological status compared to baseline. Results discussed with neuro ICU team, patient and patient's family. Right groin sheath was removed, manual compression held to hemostasis for 20 minutes, no active bleeding, no hematoma, quick clot and safeguard balloon dressing applied at Interventional Neuro- Radiology   Procedure Note PA-C    Procedure: Catheter Cerebral Angiogram and embolization of right MMA with coils and micro-particles and Left MMA with Artemio   Pre- Procedure Diagnosis: Right SDH  Post- Procedure Diagnosis:     : Dr oJel Weaver  Fellow:    Dr Hemant Elias   Physician Assistant: GENESIS Weiss Dr     Nurse:                   Dara Moore RN  Radiologic Tech:   Santiago Ghotra LRT  Anesthesiologist:   Dr Jasmine   Sheath:                 5 Cymro arrow 90cm sheath       I/Os: EBL less than 10cc  IV fluids: 700cc   Urine output 500cc  Contrast Omnipaque 240 58cc         Right MMA 2 coils and micro-particles     Left MMA 0.5cc of [place] 18     Vitals: /70        HR 59      Spo2 100%          Preliminary Report:  Using a 5 Cymro arrow 90cm sheath to the right groin under general anesthesia a catheter cerebral angiogram and embolization of right MMA was performed. Official note to follow.  Patient tolerated procedure well, hemodynamically stable, no change in neurological status compared to baseline. Results discussed with neuro ICU team, patient and patient's family. Right groin sheath was removed, vascade and manual compression held to hemostasis for 20 minutes, no active bleeding, no hematoma, quick clot and safeguard balloon dressing applied at 1445 hours. Disposition ICU.

## 2023-06-16 NOTE — PROGRESS NOTE ADULT - ASSESSMENT
ASSESSMENT/PLAN:  Plan for [place] and drainage with MMA embo for right subacute SDH.   will check LLE plain films due to fall and dopplers to r/o suspected DVT on admission  SBP<180    [] Patient is at high risk of neurologic deterioration/death due to:     Time seen:  Time spent: ___ [] critical care minutes

## 2023-06-16 NOTE — H&P ADULT - HISTORY OF PRESENT ILLNESS
Mr Hernandez is an 84 y/o male Malay speaking, brought to the ED today by his grand daughter.  Pt fell about 3 weeks ago and hit rt side of head and was seen at this ED and got sutures placed.  Today grand daughter noted pt with subtle AMS, left side weakness and pt just not himself and less conversant.  Pt took tylenol only for HA and is not on ASA or blood thinners.      NKDA    Meds: Eye drop OU BID (Family to bring name of med)    PMHx: Fatty Liver Dz                ETOH    Exam:  Dominican speaking.  AAOx3. Speech clear and appropriate. Pupils pinpoint OU. +Lt facial. +FC. FRANCO 5/5 except LUE 3/5. SLIT    < from: CT Angio Head w/ IV Cont (06.16.23 @ 01:24) >    CT HEAD: Acute mixed density right frontal and parietal convexity   subdural hematoma measuring up to 2.1 cm over the frontal convexity and   2.3 cm over the parietal convexity. Mass effect upon the right cerebrum   with 7-8mm shift of the midline towards the left and mild right uncal   herniation.    Left frontal convexity subdural hematoma measuring up to 0.9 cm in   maximum thickness.    No scalp soft tissue swelling/hematoma or acute osseous fracture.    CT CERVICAL SPINE: No acute cervical spine fracture or traumatic   malalignment. Cervical spondylosis.    CTA NECK:  No significant stenosis of the cervical carotid arteries based   on NASCET criteria. Patent cervical vertebral arteries. No evidence of   cervical carotid or vertebral artery dissection.    CTA HEAD:  No high-grade stenosis or occlusion of the major proximal   arterial branches. No evidence of an intracranial arterial aneurysm or   arteriovenous malformation on CTA. No evidence of active bleeding into   the subdural hematomas.    < end of copied text >                          14.3   6.30  )-----------( 204      ( 15 Dimitri 2023 21:21 )             43.5     06-15    137  |  101  |  19  ----------------------------<  94  4.1   |  23  |  0.89    Ca    9.6      15 Dimitri 2023 21:21    TPro  7.6  /  Alb  4.4  /  TBili  0.7  /  DBili  x   /  AST  30  /  ALT  33  /  AlkPhos  132<H>  06-15    PT/INR - ( 15 Dimitri 2023 21:21 )   PT: 11.5 sec;   INR: 0.99 ratio    PTT - ( 15 Dimitri 2023 21:21 )  PTT:27.6 sec

## 2023-06-16 NOTE — CHART NOTE - NSCHARTNOTEFT_GEN_A_CORE
***************************************************************  Gena Shona, PGY3  Internal Medicine   pager: NS: [phone] [facility]: [phone]  ***************************************************************    PROGRESS NOTE:     Patient is a 83y old  Male who presents with a chief complaint of AMS, s/p fall about 3 weeks ago (16 Jun 2023 02:05)    HPI:  Mr Hernandez is an 82 y/o male [place] speaking, brought to the ED today by his grand daughter.  Pt fell about 3 weeks ago and hit rt side of head and was seen at this ED and got sutures placed.  Today grand daughter noted pt with subtle AMS, left side weakness and pt just not himself and less conversant.  Pt took tylenol only for HA and is not on ASA or blood thinners.       NKDA    Meds: Eye drop OU BID (Family to bring name of med)    PMHx: Fatty Liver Dz                ETOH      < from: CT Angio Head w/ IV Cont (06.16.23 @ 01:24) >    CT HEAD: Acute mixed density right frontal and parietal convexity   subdural hematoma measuring up to 2.1 cm over the frontal convexity and   2.3 cm over the parietal convexity. Mass effect upon the right cerebrum   with 7-8mm shift of the midline towards the left and mild right uncal   herniation.    Left frontal convexity subdural hematoma measuring up to 0.9 cm in   maximum thickness.    No scalp soft tissue swelling/hematoma or acute osseous fracture.    CT CERVICAL SPINE: No acute cervical spine fracture or traumatic   malalignment. Cervical spondylosis.    CTA NECK:  No significant stenosis of the cervical carotid arteries based   on NASCET criteria. Patent cervical vertebral arteries. No evidence of   cervical carotid or vertebral artery dissection.    CTA HEAD:  No high-grade stenosis or occlusion of the major proximal   arterial branches. No evidence of an intracranial arterial aneurysm or   arteriovenous malformation on CTA. No evidence of active bleeding into   the subdural hematomas.    < end of copied text >                     MEDICATIONS  (STANDING):  bisacodyl 5 milliGRAM(s) Oral at bedtime  chlorhexidine 4% Liquid 1 Application(s) Topical once  famotidine    Tablet 20 milliGRAM(s) Oral daily  levETIRAcetam 500 milliGRAM(s) Oral two times a day  polyethylene glycol 3350 17 Gram(s) Oral two times a day  senna 2 Tablet(s) Oral at bedtime  sodium chloride 0.9%. 1000 milliLiter(s) (75 mL/Hr) IV Continuous <Continuous>    MEDICATIONS  (PRN):  acetaminophen     Tablet .. 650 milliGRAM(s) Oral every 6 hours PRN Temp greater or equal to 38C (100.4F), Mild Pain (1 - 3)      CAPILLARY BLOOD GLUCOSE        I&O's Summary      PHYSICAL EXAM:  Vital Signs Last 24 Hrs  T(C): 36.9 (16 Jun 2023 02:38), Max: 36.9 (16 Jun 2023 02:38)  T(F): 98.4 (16 Jun 2023 02:38), Max: 98.4 (16 Jun 2023 02:38)  HR: 54 (16 Jun 2023 02:38) (53 - 55)  BP: 160/91 (16 Jun 2023 02:38) (152/78 - 160/91)  BP(mean): 109 (16 Jun 2023 02:38) (96 - 109)  RR: 16 (16 Jun 2023 02:38) (16 - 18)  SpO2: 99% (16 Jun 2023 02:38) (97% - 100%)    Parameters below as of 16 Jun 2023 02:38  Patient On (Oxygen Delivery Method): room air      CONSTITUTIONAL: NAD, well-developed  RESPIRATORY: Normal respiratory effort; lungs are clear to auscultation bilaterally  CARDIOVASCULAR: Regular rate and rhythm, normal S1 and S2, no murmur/rub/gallop; No lower extremity edema; Peripheral pulses are 2+ bilaterally  ABDOMEN: Nontender to palpation, normoactive bowel sounds, no rebound/guarding; No hepatosplenomegaly  MUSCLOSKELETAL: no clubbing or cyanosis of digits; no joint swelling or tenderness to palpation  NEURO: CN 2-12 grossly intact, moves all limbs spontaneously. Pupils pinpoint OU. +Lt facial. +FC. FRANCO 5/5 except LUE 3/5. SLIT  PSYCH: A+O to person, place, and time; affect appropriate    LABS:                        14.3   6.30  )-----------( 204      ( 15 Dimitri 2023 21:21 )             43.5     06-15    137  |  101  |  19  ----------------------------<  94  4.1   |  23  |  0.89    Ca    9.6      15 Dimitri 2023 21:21    TPro  7.6  /  Alb  4.4  /  TBili  0.7  /  DBili  x   /  AST  30  /  ALT  33  /  AlkPhos  132<H>  06-15    PT/INR - ( 15 Dimitri 2023 21:21 )   PT: 11.5 sec;   INR: 0.99 ratio         PTT - ( 15 Dimitri 2023 21:21 )  PTT:27.6 sec            RADIOLOGY & ADDITIONAL TESTS:  Results Reviewed:   Imaging Personally Reviewed:  Electrocardiogram Personally Reviewed:    COORDINATION OF CARE:  Care Discussed with Consultants/Other Providers [Y/N]: Y  Prior or Outpatient Records Reviewed [Y/N]:     ASSESSMENT & PLAN:    Mr Hernandez is an 82 y/o male [place] speaking, brought to the ED today by his grand daughter.  Pt fell about 3 weeks ago and hit rt side of head and was seen at this ED and got sutures placed.  Today grand daughter noted pt with subtle AMS, left side weakness and pt just not himself and less conversant. Pt admited to NSCU for close neuro observation.     PLAN:  - Preop for poss [place] hole vs MMAE on 6/16 per NSG   - Keppra 500 BID per primary team   - Hold AC  - NPO  - Patient has METS     ***************************************************************  Gena Nagel, PGY3  Internal Medicine   pager: NS: [phone] [facility]: [phone]  *************************************************************** ***************************************************************  Gena Shona, PGY3  Internal Medicine   pager: NS: [phone] [facility]: [phone]  ***************************************************************    PROGRESS NOTE:     Patient is a 83y old  Male who presents with a chief complaint of AMS, s/p fall about 3 weeks ago (16 Jun 2023 02:05)    HPI:  Mr Hernandez is an 84 y/o male Nepali speaking, PMHx glaucoma brought to the ED today by his grand daughter.  Pt fell about 3 weeks ago and hit rt side of head and was seen at this ED and got sutures placed.  Today grand daughter noted pt with subtle AMS, left side weakness and pt just not himself and less conversant.  Pt took tylenol only for HA and is not on ASA or blood thinners.       NKDA    Meds: Eye drop OU BID (Family to bring name of med)    PMHx:   Glaucoma  Fatty Liver Dz  ETOH      < from: CT Angio Head w/ IV Cont (06.16.23 @ 01:24) >    CT HEAD: Acute mixed density right frontal and parietal convexity   subdural hematoma measuring up to 2.1 cm over the frontal convexity and   2.3 cm over the parietal convexity. Mass effect upon the right cerebrum   with 7-8mm shift of the midline towards the left and mild right uncal   herniation.    Left frontal convexity subdural hematoma measuring up to 0.9 cm in   maximum thickness.    No scalp soft tissue swelling/hematoma or acute osseous fracture.    CT CERVICAL SPINE: No acute cervical spine fracture or traumatic   malalignment. Cervical spondylosis.    CTA NECK:  No significant stenosis of the cervical carotid arteries based   on NASCET criteria. Patent cervical vertebral arteries. No evidence of   cervical carotid or vertebral artery dissection.    CTA HEAD:  No high-grade stenosis or occlusion of the major proximal   arterial branches. No evidence of an intracranial arterial aneurysm or   arteriovenous malformation on CTA. No evidence of active bleeding into   the subdural hematomas.    < end of copied text >                     MEDICATIONS  (STANDING):  bisacodyl 5 milliGRAM(s) Oral at bedtime  chlorhexidine 4% Liquid 1 Application(s) Topical once  famotidine    Tablet 20 milliGRAM(s) Oral daily  levETIRAcetam 500 milliGRAM(s) Oral two times a day  polyethylene glycol 3350 17 Gram(s) Oral two times a day  senna 2 Tablet(s) Oral at bedtime  sodium chloride 0.9%. 1000 milliLiter(s) (75 mL/Hr) IV Continuous <Continuous>    MEDICATIONS  (PRN):  acetaminophen     Tablet .. 650 milliGRAM(s) Oral every 6 hours PRN Temp greater or equal to 38C (100.4F), Mild Pain (1 - 3)      CAPILLARY BLOOD GLUCOSE        I&O's Summary      PHYSICAL EXAM:  Vital Signs Last 24 Hrs  T(C): 36.9 (16 Jun 2023 02:38), Max: 36.9 (16 Jun 2023 02:38)  T(F): 98.4 (16 Jun 2023 02:38), Max: 98.4 (16 Jun 2023 02:38)  HR: 54 (16 Jun 2023 02:38) (53 - 55)  BP: 160/91 (16 Jun 2023 02:38) (152/78 - 160/91)  BP(mean): 109 (16 Jun 2023 02:38) (96 - 109)  RR: 16 (16 Jun 2023 02:38) (16 - 18)  SpO2: 99% (16 Jun 2023 02:38) (97% - 100%)    Parameters below as of 16 Jun 2023 02:38  Patient On (Oxygen Delivery Method): room air      CONSTITUTIONAL: NAD, well-developed  RESPIRATORY: Normal respiratory effort; lungs are clear to auscultation bilaterally  CARDIOVASCULAR: Regular rate and rhythm, normal S1 and S2, no murmur/rub/gallop; No lower extremity edema; Peripheral pulses are 2+ bilaterally  ABDOMEN: Nontender to palpation, normoactive bowel sounds, no rebound/guarding; No hepatosplenomegaly  MUSCLOSKELETAL: no clubbing or cyanosis of digits; no joint swelling or tenderness to palpation  NEURO: CN 2-12 grossly intact, moves all limbs spontaneously. Pupils pinpoint. Motor strength 5/5 except LUE 3/5.   PSYCH: A+O to person, place, and time; affect appropriate    LABS:                        14.3   6.30  )-----------( 204      ( 15 Dimitri 2023 21:21 )             43.5     06-15    137  |  101  |  19  ----------------------------<  94  4.1   |  23  |  0.89    Ca    9.6      15 Dimitri 2023 21:21    TPro  7.6  /  Alb  4.4  /  TBili  0.7  /  DBili  x   /  AST  30  /  ALT  33  /  AlkPhos  132<H>  06-15    PT/INR - ( 15 Dimitri 2023 21:21 )   PT: 11.5 sec;   INR: 0.99 ratio         PTT - ( 15 Dimitri 2023 21:21 )  PTT:27.6 sec            RADIOLOGY & ADDITIONAL TESTS:  Results Reviewed: Yes  Imaging Personally Reviewed:  Electrocardiogram Personally Reviewed:    COORDINATION OF CARE:  Care Discussed with Consultants/Other Providers [Y/N]: Y  Prior or Outpatient Records Reviewed [Y/N]:     ASSESSMENT & PLAN:    Mr Hernandez is an 84 y/o male Nepali speaking, brought to the ED today by his grand daughter.  Pt fell about 3 weeks ago and hit rt side of head and was seen at this ED and got sutures placed.  Today grand daughter noted pt with subtle AMS, left side weakness and pt just not himself and less conversant. Pt admited to NSCU for close neuro observation.     PLAN:  - RCRI: 1 points, 6% 30-day risk of death, MI, cardiac arrest  - Pt is able to climb a flight of stairs, per daughter before falling 3 weeks ago. Now, has had falls, cannot dress himself, and has difficulty walking. No cardiac problems in the past.   - Preop for poss [place] hole vs MMAE on 6/16 per NSG   - Keppra 500 BID per primary team   - Hold AC  - NPO  - Patient is medically optimized for surgery.    ***************************************************************  Gena Nagel, PGY3  Internal Medicine   pager: NS: [phone] [facility]: [phone]  *************************************************************** ***************************************************************  Gena Shona, PGY3  Internal Medicine   pager: NS: [phone] [facility]: [phone]  ***************************************************************      Patient is a 83y old  Male who presents with a chief complaint of AMS, s/p fall about 3 weeks ago (16 Jun 2023 02:05)    HPI:  Mr Hernandez is an 82 y/o male [place] speaking, PMHx glaucoma brought to the ED today by his grand daughter.  Pt fell about 3 weeks ago and hit rt side of head and was seen at this ED and got sutures placed.  Today grand daughter noted pt with subtle AMS, left side weakness and pt just not himself and less conversant.  Pt took tylenol only for HA and is not on ASA or blood thinners.       NKDA    Meds: Eye drop OU BID (Family to bring name of med)    PMHx:   Glaucoma  Fatty Liver Dz  ETOH      < from: CT Angio Head w/ IV Cont (06.16.23 @ 01:24) >    CT HEAD: Acute mixed density right frontal and parietal convexity   subdural hematoma measuring up to 2.1 cm over the frontal convexity and   2.3 cm over the parietal convexity. Mass effect upon the right cerebrum   with 7-8mm shift of the midline towards the left and mild right uncal   herniation.    Left frontal convexity subdural hematoma measuring up to 0.9 cm in   maximum thickness.    No scalp soft tissue swelling/hematoma or acute osseous fracture.    CT CERVICAL SPINE: No acute cervical spine fracture or traumatic   malalignment. Cervical spondylosis.    CTA NECK:  No significant stenosis of the cervical carotid arteries based   on NASCET criteria. Patent cervical vertebral arteries. No evidence of   cervical carotid or vertebral artery dissection.    CTA HEAD:  No high-grade stenosis or occlusion of the major proximal   arterial branches. No evidence of an intracranial arterial aneurysm or   arteriovenous malformation on CTA. No evidence of active bleeding into   the subdural hematomas.    < end of copied text >                     MEDICATIONS  (STANDING):  bisacodyl 5 milliGRAM(s) Oral at bedtime  chlorhexidine 4% Liquid 1 Application(s) Topical once  famotidine    Tablet 20 milliGRAM(s) Oral daily  levETIRAcetam 500 milliGRAM(s) Oral two times a day  polyethylene glycol 3350 17 Gram(s) Oral two times a day  senna 2 Tablet(s) Oral at bedtime  sodium chloride 0.9%. 1000 milliLiter(s) (75 mL/Hr) IV Continuous <Continuous>    MEDICATIONS  (PRN):  acetaminophen     Tablet .. 650 milliGRAM(s) Oral every 6 hours PRN Temp greater or equal to 38C (100.4F), Mild Pain (1 - 3)      CAPILLARY BLOOD GLUCOSE        I&O's Summary      PHYSICAL EXAM:  Vital Signs Last 24 Hrs  T(C): 36.9 (16 Jun 2023 02:38), Max: 36.9 (16 Jun 2023 02:38)  T(F): 98.4 (16 Jun 2023 02:38), Max: 98.4 (16 Jun 2023 02:38)  HR: 54 (16 Jun 2023 02:38) (53 - 55)  BP: 160/91 (16 Jun 2023 02:38) (152/78 - 160/91)  BP(mean): 109 (16 Jun 2023 02:38) (96 - 109)  RR: 16 (16 Jun 2023 02:38) (16 - 18)  SpO2: 99% (16 Jun 2023 02:38) (97% - 100%)    Parameters below as of 16 Jun 2023 02:38  Patient On (Oxygen Delivery Method): room air      CONSTITUTIONAL: NAD, well-developed  RESPIRATORY: Normal respiratory effort; lungs are clear to auscultation bilaterally  CARDIOVASCULAR: Regular rate and rhythm, normal S1 and S2, no murmur/rub/gallop; No lower extremity edema; Peripheral pulses are 2+ bilaterally  ABDOMEN: Nontender to palpation, normoactive bowel sounds, no rebound/guarding; No hepatosplenomegaly  MUSCLOSKELETAL: no clubbing or cyanosis of digits; no joint swelling or tenderness to palpation  NEURO: CN 2-12 grossly intact, moves all limbs spontaneously. Pupils pinpoint. Motor strength 5/5 except LUE 3/5.   PSYCH: A+O to person, place, and time; affect appropriate    LABS:                        14.3   6.30  )-----------( 204      ( 15 Idmitri 2023 21:21 )             43.5     06-15    137  |  101  |  19  ----------------------------<  94  4.1   |  23  |  0.89    Ca    9.6      15 Dimitri 2023 21:21    TPro  7.6  /  Alb  4.4  /  TBili  0.7  /  DBili  x   /  AST  30  /  ALT  33  /  AlkPhos  132<H>  06-15    PT/INR - ( 15 Dimitri 2023 21:21 )   PT: 11.5 sec;   INR: 0.99 ratio         PTT - ( 15 Dimitri 2023 21:21 )  PTT:27.6 sec            RADIOLOGY & ADDITIONAL TESTS:  Results Reviewed: Yes  Imaging Personally Reviewed:  Electrocardiogram Personally Reviewed:    COORDINATION OF CARE:  Care Discussed with Consultants/Other Providers [Y/N]: Y  Prior or Outpatient Records Reviewed [Y/N]:     ASSESSMENT & PLAN:    Mr Hernandez is an 82 y/o male [place] speaking, PMHx glaucoma brought to the ED today by his grand daughter.  Pt fell about 3 weeks ago and hit rt side of head and was seen at this ED and got sutures placed.  Today grand daughter noted pt with subtle AMS, left side weakness and pt just not himself and less conversant. Pt admitted to NSCU for close neuro observation.     PLAN:  - RCRI: 1 points, 6% 30-day risk of death, MI, cardiac arrest  - Pt is able to climb a flight of stairs, per daughter before falling 3 weeks ago. METS > 4. Now, has had falls, cannot dress himself, and has difficulty walking. No cardiac problems in the past.   - EKG has been ordered, no current chest pain.   - No medical contraindications for proceeding with urgent neurosurgical procedure.  - Speech and swallow eval, PT eval.   - Fall precautions. Aspiration precautions.   - Neurochecks per NSICU  - Preop for poss [place] hole vs MMAE on 6/16 per NSG   - Keppra 500 BID per primary team   - Hold AC  - NPO  - Patient is medically optimized for surgery.      ***************************************************************  Gena Nagel, PGY3  Internal Medicine   pager: NS: [phone] [facility]: [id number]  *************************************************************** ***************************************************************  Gena Tannermann, PGY3  Internal Medicine   pager: NS: [phone] [facility]: [id number]  ***************************************************************      Patient is a 83y old  Male who presents with a chief complaint of AMS, s/p fall about 3 weeks ago (16 Jun 2023 02:05)    HPI:  Mr Hernandez is an 82 y/o male Japanese speaking, PMHx glaucoma brought to the ED today by his grand daughter.  Pt fell about 3 weeks ago and hit rt side of head and was seen at this ED and got sutures placed.  Today grand daughter noted pt with subtle AMS, left side weakness and pt just not himself and less conversant.  Pt took tylenol only for HA and is not on ASA or blood thinners.     Pt has a headache and left calf pain. He has no chest pain, SOB, abdominal pain, fevers, chills. Otherwise negative ROS.      NKDA    Meds: Eye drop OU BID (Family to bring name of med)    PMHx:   Glaucoma  Fatty Liver Dz  ETOH      < from: CT Angio Head w/ IV Cont (06.16.23 @ 01:24) >    CT HEAD: Acute mixed density right frontal and parietal convexity   subdural hematoma measuring up to 2.1 cm over the frontal convexity and   2.3 cm over the parietal convexity. Mass effect upon the right cerebrum   with 7-8mm shift of the midline towards the left and mild right uncal   herniation.    Left frontal convexity subdural hematoma measuring up to 0.9 cm in   maximum thickness.    No scalp soft tissue swelling/hematoma or acute osseous fracture.    CT CERVICAL SPINE: No acute cervical spine fracture or traumatic   malalignment. Cervical spondylosis.    CTA NECK:  No significant stenosis of the cervical carotid arteries based   on NASCET criteria. Patent cervical vertebral arteries. No evidence of   cervical carotid or vertebral artery dissection.    CTA HEAD:  No high-grade stenosis or occlusion of the major proximal   arterial branches. No evidence of an intracranial arterial aneurysm or   arteriovenous malformation on CTA. No evidence of active bleeding into   the subdural hematomas.    < end of copied text >                     MEDICATIONS  (STANDING):  bisacodyl 5 milliGRAM(s) Oral at bedtime  chlorhexidine 4% Liquid 1 Application(s) Topical once  famotidine    Tablet 20 milliGRAM(s) Oral daily  levETIRAcetam 500 milliGRAM(s) Oral two times a day  polyethylene glycol 3350 17 Gram(s) Oral two times a day  senna 2 Tablet(s) Oral at bedtime  sodium chloride 0.9%. 1000 milliLiter(s) (75 mL/Hr) IV Continuous <Continuous>    MEDICATIONS  (PRN):  acetaminophen     Tablet .. 650 milliGRAM(s) Oral every 6 hours PRN Temp greater or equal to 38C (100.4F), Mild Pain (1 - 3)      CAPILLARY BLOOD GLUCOSE        I&O's Summary      PHYSICAL EXAM:  Vital Signs Last 24 Hrs  T(C): 36.9 (16 Jun 2023 02:38), Max: 36.9 (16 Jun 2023 02:38)  T(F): 98.4 (16 Jun 2023 02:38), Max: 98.4 (16 Jun 2023 02:38)  HR: 54 (16 Jun 2023 02:38) (53 - 55)  BP: 160/91 (16 Jun 2023 02:38) (152/78 - 160/91)  BP(mean): 109 (16 Jun 2023 02:38) (96 - 109)  RR: 16 (16 Jun 2023 02:38) (16 - 18)  SpO2: 99% (16 Jun 2023 02:38) (97% - 100%)    Parameters below as of 16 Jun 2023 02:38  Patient On (Oxygen Delivery Method): room air      CONSTITUTIONAL: NAD, well-developed  RESPIRATORY: Normal respiratory effort; lungs are clear to auscultation bilaterally  CARDIOVASCULAR: Regular rate and rhythm, normal S1 and S2, no murmur/rub/gallop; No lower extremity edema; Peripheral pulses are 2+ bilaterally  ABDOMEN: Nontender to palpation, normoactive bowel sounds, no rebound/guarding; No hepatosplenomegaly  MUSCLOSKELETAL: no clubbing or cyanosis of digits; TTP left lower extremity  NEURO: CN 2-12 grossly intact, moves all limbs spontaneously. Pupils pinpoint. Motor strength 5/5 except LUE 3/5. Left pronator drift.   PSYCH: A+O to person, place, and time; affect appropriate    LABS:                        14.3   6.30  )-----------( 204      ( 15 Dimitri 2023 21:21 )             43.5     06-15    137  |  101  |  19  ----------------------------<  94  4.1   |  23  |  0.89    Ca    9.6      15 Dimitri 2023 21:21    TPro  7.6  /  Alb  4.4  /  TBili  0.7  /  DBili  x   /  AST  30  /  ALT  33  /  AlkPhos  132<H>  06-15    PT/INR - ( 15 Dimitri 2023 21:21 )   PT: 11.5 sec;   INR: 0.99 ratio         PTT - ( 15 Dimitri 2023 21:21 )  PTT:27.6 sec            ASSESSMENT & PLAN:    Mr Hernandez is an 82 y/o male Japanese speaking, PMHx glaucoma brought to the ED today by his grand daughter.  Pt fell about 3 weeks ago and hit rt side of head and was seen at this ED and got sutures placed.  Today grand daughter noted pt with subtle AMS, left side weakness and pt just not himself and less conversant. Pt admitted to NSCU for close neuro observation.     PLAN:  - RCRI: 1 points, 6% 30-day risk of death, MI, cardiac arrest  - Pt is able to climb a flight of stairs, per daughter before falling 3 weeks ago. METS > 4. Now, has had falls, cannot dress himself, and has difficulty walking. No cardiac problems in the past.   - EKG has been ordered, no current chest pain.   - DVT studies to r/o clot per primary team.  - No medical contraindications for proceeding with urgent neurosurgical procedure.  - Speech and swallow eval, PT eval.   - Fall precautions. Aspiration precautions.   - Neurochecks per NSICU  - Preop for poss Ricky hole vs MMAE on 6/16 per NSG   - Keppra 500 BID per primary team   - Hold AC  - NPO  - Patient is medically optimized for surgery.      ***************************************************************  Gena Nagel, PGY3  Internal Medicine   pager: NS: [phone] [facility]: [phone]  *************************************************************** ***************************************************************  Gena Shona, PGY3  Internal Medicine   pager: NS: [phone] [facility]: [phone]  ***************************************************************      Patient is a 83y old  Male who presents with a chief complaint of AMS, s/p fall about 2 days ago (16 Jun 2023 02:05)    HPI:  Initial history from pt's granddaughter at bedside. Additional history obtained from pt's daughter Beth Hernandez over phone.    Mr Hernandez is an 82 y/o male Beninese speaking, PMHx glaucoma, fall 3 months ago, presents to ED today with his grand daughter for another fall 2 days ago, with subsequent subtle AMS, left side weakness and pt just not himself and less conversant.  Pt took tylenol only for HA and is not on ASA or blood thinners.   Pt has a headache and left calf pain. He has no chest pain, SOB, abdominal pain, fevers, chills. Otherwise negative ROS. Record of fall 3 [place] ago [place], when pt presented to [facility] for lac repair.    NKDA    Meds:   Eye drop OU BID (Family to bring name of med)    PMHx:   Glaucoma  Fatty Liver Dz  ETOH      < from: CT Angio Head w/ IV Cont (06.16.23 @ 01:24) >    CT HEAD: Acute mixed density right frontal and parietal convexity   subdural hematoma measuring up to 2.1 cm over the frontal convexity and   2.3 cm over the parietal convexity. Mass effect upon the right cerebrum   with 7-8mm shift of the midline towards the left and mild right uncal   herniation.    Left frontal convexity subdural hematoma measuring up to 0.9 cm in   maximum thickness.    No scalp soft tissue swelling/hematoma or acute osseous fracture.    CT CERVICAL SPINE: No acute cervical spine fracture or traumatic   malalignment. Cervical spondylosis.    CTA NECK:  No significant stenosis of the cervical carotid arteries based   on NASCET criteria. Patent cervical vertebral arteries. No evidence of   cervical carotid or vertebral artery dissection.    CTA HEAD:  No high-grade stenosis or occlusion of the major proximal   arterial branches. No evidence of an intracranial arterial aneurysm or   arteriovenous malformation on CTA. No evidence of active bleeding into   the subdural hematomas.    < end of copied text >                     MEDICATIONS  (STANDING):  bisacodyl 5 milliGRAM(s) Oral at bedtime  chlorhexidine 4% Liquid 1 Application(s) Topical once  famotidine    Tablet 20 milliGRAM(s) Oral daily  levETIRAcetam 500 milliGRAM(s) Oral two times a day  polyethylene glycol 3350 17 Gram(s) Oral two times a day  senna 2 Tablet(s) Oral at bedtime  sodium chloride 0.9%. 1000 milliLiter(s) (75 mL/Hr) IV Continuous <Continuous>    MEDICATIONS  (PRN):  acetaminophen     Tablet .. 650 milliGRAM(s) Oral every 6 hours PRN Temp greater or equal to 38C (100.4F), Mild Pain (1 - 3)      CAPILLARY BLOOD GLUCOSE      Social History:    Lives with: (  ) alone, ( x ) children, (  ) spouse, (  ) parents, (  ) siblings, (  ) friends, (  ) other:   Substance Use/Illicit Drugs: (  ) never used vs other: none  Last Alcohol Usage/Frequency/Amount/Withdrawal/Hx of Abuse:  last drink of wine 4 days ago. Drinks alcohol 3 days of the week. no hx of severe withdrawal, seizures.      I&O's Summary      PHYSICAL EXAM:  Vital Signs Last 24 Hrs  T(C): 36.9 (16 Jun 2023 02:38), Max: 36.9 (16 Jun 2023 02:38)  T(F): 98.4 (16 Jun 2023 02:38), Max: 98.4 (16 Jun 2023 02:38)  HR: 54 (16 Jun 2023 02:38) (53 - 55)  BP: 160/91 (16 Jun 2023 02:38) (152/78 - 160/91)  BP(mean): 109 (16 Jun 2023 02:38) (96 - 109)  RR: 16 (16 Jun 2023 02:38) (16 - 18)  SpO2: 99% (16 Jun 2023 02:38) (97% - 100%)    Parameters below as of 16 Jun 2023 02:38  Patient On (Oxygen Delivery Method): room air      CONSTITUTIONAL: NAD, well-developed  RESPIRATORY: Normal respiratory effort; lungs are clear to auscultation bilaterally  CARDIOVASCULAR: Regular rate and rhythm, normal S1 and S2, no murmur/rub/gallop; No lower extremity edema; Peripheral pulses are 2+ bilaterally  ABDOMEN: Nontender to palpation, normoactive bowel sounds, no rebound/guarding; No hepatosplenomegaly  MUSCLOSKELETAL: no clubbing or cyanosis of digits; TTP left lower extremity  NEURO: CN 2-12 grossly intact, moves all limbs spontaneously. Pupils pinpoint. Motor strength 5/5 except LUE 3/5. Left pronator drift.   PSYCH: A+O to person, place, and time; affect appropriate    Personally reviewed imaging, labs. CTH with large right SDH.  EKG ordered.    LABS:                        14.3   6.30  )-----------( 204      ( 15 Dimitri 2023 21:21 )             43.5     06-15    137  |  101  |  19  ----------------------------<  94  4.1   |  23  |  0.89    Ca    9.6      15 Dimitri 2023 21:21    TPro  7.6  /  Alb  4.4  /  TBili  0.7  /  DBili  x   /  AST  30  /  ALT  33  /  AlkPhos  132<H>  06-15    PT/INR - ( 15 Dimitri 2023 21:21 )   PT: 11.5 sec;   INR: 0.99 ratio         PTT - ( 15 Dimitri 2023 21:21 )  PTT:27.6 sec            ASSESSMENT & PLAN:    Mr Hernandez is an 82 y/o male Beninese speaking, PMHx glaucoma, fall 3 months ago, presents with another fall 2 days ago c/b left sided weakness and confusion, found to have 2.3cm R frontal/parietal SDH, and small left 0.9cm SDH, with mass effect and mild uncal herniation.     PLAN:  - RCRI: 1 points, 6% 30-day risk of death, MI, cardiac arrest  - Pt is able to climb a flight of stairs, per daughter before falling 2 days ago. METS > 4. Now, has had falls, cannot dress himself, and has difficulty walking. No cardiac problems in the past.   - EKG has been ordered, no current chest pain. Per daughter, never had stress test.  - DVT studies to r/o clot per primary team.  - No medical contraindications for proceeding with urgent neurosurgical procedure.  - Speech and swallow eval, PT eval.   - Fall precautions. Aspiration precautions.   - Neurochecks per NSICU  - Preop for poss [place] hole vs MMAE on 6/16 per NSG   - Keppra 500 BID per primary team   - Hold AC  - NPO  - Patient is medically optimized for surgery.  - will start empiric latanoprost for eyes until family brings in home glaucoma eye drops      ***************************************************************  Gena Nagel, PGY3  Internal Medicine   pager: NS: [phone] [facility]: [phone]  *************************************************************** ***************************************************************  Gena Shona, PGY3  Internal Medicine   pager: NS: [phone] [facility]: [phone]  ***************************************************************      Patient is a 83y old  Male who presents with a chief complaint of AMS, s/p fall about 2 days ago (16 Jun 2023 02:05)    HPI:  Initial history from pt's granddaughter at bedside. Additional history obtained from pt's daughter Beth Hernandez over phone.    Mr Hernandez is an 84 y/o male [place] speaking, PMHx glaucoma, fall 3 months ago, presents to ED today with his grand daughter for another fall 2 days ago, with subsequent subtle AMS, left side weakness and pt just not himself and less conversant.  Pt took tylenol only for HA and is not on ASA or blood thinners.   Pt has a headache and left calf pain. He has no chest pain, SOB, abdominal pain, fevers, chills. Otherwise negative ROS. Record of fall 3 months ago in [place], when pt presented to [facility] for lac repair.    NKDA    Meds:   Eye drop OU BID (Family to bring name of med)    PMHx:   Glaucoma  Fatty Liver Dz  ETOH      < from: CT Angio Head w/ IV Cont (06.16.23 @ 01:24) >    CT HEAD: Acute mixed density right frontal and parietal convexity   subdural hematoma measuring up to 2.1 cm over the frontal convexity and   2.3 cm over the parietal convexity. Mass effect upon the right cerebrum   with 7-8mm shift of the midline towards the left and mild right uncal   herniation.    Left frontal convexity subdural hematoma measuring up to 0.9 cm in   maximum thickness.    No scalp soft tissue swelling/hematoma or acute osseous fracture.    CT CERVICAL SPINE: No acute cervical spine fracture or traumatic   malalignment. Cervical spondylosis.    CTA NECK:  No significant stenosis of the cervical carotid arteries based   on NASCET criteria. Patent cervical vertebral arteries. No evidence of   cervical carotid or vertebral artery dissection.    CTA HEAD:  No high-grade stenosis or occlusion of the major proximal   arterial branches. No evidence of an intracranial arterial aneurysm or   arteriovenous malformation on CTA. No evidence of active bleeding into   the subdural hematomas.    < end of copied text >                     MEDICATIONS  (STANDING):  bisacodyl 5 milliGRAM(s) Oral at bedtime  chlorhexidine 4% Liquid 1 Application(s) Topical once  famotidine    Tablet 20 milliGRAM(s) Oral daily  levETIRAcetam 500 milliGRAM(s) Oral two times a day  polyethylene glycol 3350 17 Gram(s) Oral two times a day  senna 2 Tablet(s) Oral at bedtime  sodium chloride 0.9%. 1000 milliLiter(s) (75 mL/Hr) IV Continuous <Continuous>    MEDICATIONS  (PRN):  acetaminophen     Tablet .. 650 milliGRAM(s) Oral every 6 hours PRN Temp greater or equal to 38C (100.4F), Mild Pain (1 - 3)      CAPILLARY BLOOD GLUCOSE      Social History:    Lives with: (  ) alone, ( x ) children, (  ) spouse, (  ) parents, (  ) siblings, (  ) friends, (  ) other:   Substance Use/Illicit Drugs: (  ) never used vs other: none  Last Alcohol Usage/Frequency/Amount/Withdrawal/Hx of Abuse:  last drink of wine 4 days ago. Drinks alcohol 3 days of the week. no hx of severe withdrawal, seizures.      I&O's Summary      PHYSICAL EXAM:  Vital Signs Last 24 Hrs  T(C): 36.9 (16 Jun 2023 02:38), Max: 36.9 (16 Jun 2023 02:38)  T(F): 98.4 (16 Jun 2023 02:38), Max: 98.4 (16 Jun 2023 02:38)  HR: 54 (16 Jun 2023 02:38) (53 - 55)  BP: 160/91 (16 Jun 2023 02:38) (152/78 - 160/91)  BP(mean): 109 (16 Jun 2023 02:38) (96 - 109)  RR: 16 (16 Jun 2023 02:38) (16 - 18)  SpO2: 99% (16 Jun 2023 02:38) (97% - 100%)    Parameters below as of 16 Jun 2023 02:38  Patient On (Oxygen Delivery Method): room air      CONSTITUTIONAL: NAD, well-developed  RESPIRATORY: Normal respiratory effort; lungs are clear to auscultation bilaterally  CARDIOVASCULAR: Regular rate and rhythm, normal S1 and S2, no murmur/rub/gallop; No lower extremity edema; Peripheral pulses are 2+ bilaterally  ABDOMEN: Nontender to palpation, normoactive bowel sounds, no rebound/guarding; No hepatosplenomegaly  MUSCLOSKELETAL: no clubbing or cyanosis of digits; TTP left lower extremity  NEURO: CN 2-12 grossly intact, moves all limbs spontaneously. Pupils pinpoint. Motor strength 5/5 except LUE 3/5. Left pronator drift.   PSYCH: A+O to person, place, and time; affect appropriate    Personally reviewed imaging, labs. CTH with large right SDH.  EKG ordered.    LABS:                        14.3   6.30  )-----------( 204      ( 15 Dimitri 2023 21:21 )             43.5     06-15    137  |  101  |  19  ----------------------------<  94  4.1   |  23  |  0.89    Ca    9.6      15 Dimitri 2023 21:21    TPro  7.6  /  Alb  4.4  /  TBili  0.7  /  DBili  x   /  AST  30  /  ALT  33  /  AlkPhos  132<H>  06-15    PT/INR - ( 15 Dimitri 2023 21:21 )   PT: 11.5 sec;   INR: 0.99 ratio         PTT - ( 15 Dimitri 2023 21:21 )  PTT:27.6 sec            ASSESSMENT & PLAN:    Mr Hernandez is an 84 y/o male [place] speaking, PMHx glaucoma, fall 3 months ago, presents with another fall 2 days ago c/b left sided weakness and confusion, found to have 2.3cm R frontal/parietal SDH, and small left 0.9cm SDH, with mass effect and mild uncal herniation.     PLAN:  - RCRI: 1 points, 6% 30-day risk of death, MI, cardiac arrest  - Pt is able to climb a flight of stairs, per daughter before falling 2 days ago. METS > 4. Now, has had falls, cannot dress himself, and has difficulty walking. No cardiac problems in the past.   - EKG has been ordered, no current chest pain. Per daughter, never had stress test.  - DVT studies to r/o clot per primary team.  - No medical contraindications for proceeding with urgent neurosurgical procedure.  - Speech and swallow eval, PT eval.   - Fall precautions. Aspiration precautions.   - Neurochecks per NSICU  - Preop for poss [place] hole vs MMAE on 6/16 per NSG   - Keppra 500 BID per primary team   - Hold AC  - NPO  - Patient is medically optimized for surgery.  - will start empiric latanoprost for eyes until family brings in home glaucoma eye drops      ***************************************************************  Gena Nagel, PGY3  Internal Medicine   pager: NS: [phone] [facility]: [phone]  ***************************************************************    Tigre Grajeda M.D.  [facility] Attending  Office Answering Service: [phone]   Pager: [phone] (Only available from 8PM until 8AM)

## 2023-06-16 NOTE — PATIENT PROFILE ADULT - FALL HARM RISK - HARM RISK INTERVENTIONS
Assistance with ambulation/Assistance OOB with selected safe patient handling equipment/Communicate Risk of Fall with Harm to all staff/Discuss with provider need for PT consult/Monitor gait and stability/Provide patient with walking aids - walker, cane, crutches/Reinforce activity limits and safety measures with patient and family/Sit up slowly, dangle for a short time, stand at bedside before walking/Tailored Fall Risk Interventions/Use of alarms - bed, chair and/or voice tab/Visual Cue: Yellow wristband and red socks/Bed in lowest position, wheels locked, appropriate side rails in place/Call bell, personal items and telephone in reach/Instruct patient to call for assistance before getting out of bed or chair/Non-slip footwear when patient is out of bed/[place] to call system/Physically safe environment - no spills, clutter or unnecessary equipment/Purposeful Proactive Rounding/Room/bathroom lighting operational, light cord in reach

## 2023-06-16 NOTE — H&P ADULT - ASSESSMENT
PROCEDURE:  Adm 6/16 Rt SDH w/MLS  POD#NA    PLAN:  Neuro: Admit to NSCU for close neuro observation. Preop for poss [place] hole vs MMAE on 6/16. Keppra 500 BID. Hold AC, NPO, IVF. FU Preop labs.  Above d/w Dr Winston..     Hospitalist aware to see pt for OR Clearance FU

## 2023-06-16 NOTE — PHYSICAL THERAPY INITIAL EVALUATION ADULT - ADDITIONAL COMMENTS
Pt lives in a house w/ spouse and family (son, daughter in law, 2 granddaughters), 3+1 steps to enter (+) handrail, 3+1 flight of steps to bedroom, full bath on 1st fl only. Pt reports being independent w/ all ADLs/IADLs and ambulates w/o AD. Pt reports x3 falls in past 2 months w/ most recent fall ~ 2 days PTA due to falling off chair.

## 2023-06-16 NOTE — PROGRESS NOTE ADULT - ASSESSMENT
ASSESSMENT/PLAN:  84y/o male POD0, for burrhole and subdural drain placement, and B/L MMA embo., LLE xray films done, stable. Dopplers pending for DVT r/o. Continue to monitor exam and visual fields    Neuro:  q1 neuro  pain control w/ tylenol  ct head am  monitor drain output, Keep flat while drains are in  keppra 500 BID for seizure ppx  safeguard removed, continue to monitor wound site    CV  100-160    Pulm  RA  Incentive spirometry    GI: miralax and senna, dulcomax for BM  Voiding with pinto  Diet, soft and bite size    Renal:  IVL    Endo  -180    Heme:  SCDs, hold chem dvt ppx  duplex-p    ID:  afebrile      Dispo: stay in ICU for further monitoring         Time seen:  Time spent: 50 [x] critical care minutes

## 2023-06-16 NOTE — PROGRESS NOTE ADULT - SUBJECTIVE AND OBJECTIVE BOX
SUMMARY:    Daytime Events:     T(C): 36.5 (06-16-23 @ 12:45), Max: 36.9 (06-16-23 @ 02:38)  HR: 53 (06-16-23 @ 12:45) (52 - 60)  BP: 137/67 (06-16-23 @ 12:45) (122/64 - 169/76)  RR: 15 (06-16-23 @ 12:45) (13 - 18)  SpO2: 100% (06-16-23 @ 12:45) (95% - 100%)  Wt(kg): --    Physical Exam:  EXAMINATION:  General: No acute distress  HEENT: Anicteric sclerae  Cardiac: [id number]  Lungs: Clear  Abdomen: Soft, non-tender, +BS  Extremities: left leg swollen, erythematous  Skin/Incision Site: Clean, dry and intact  Neurologic: Awake, alert, fully oriented, follows commands, PERRL, VFFtc, EOMI, face symmetric, tongue midline, left arm 3/5, left leg 4-/5; right side 5/5    IMAGING/DATA: [] Reviewed    acetaminophen     Tablet .. 650 milliGRAM(s) Oral every 6 hours PRN  bisacodyl 5 milliGRAM(s) Oral at bedtime  chlorhexidine 4% Liquid 1 Application(s) Topical daily  famotidine    Tablet 20 milliGRAM(s) Oral daily  latanoprost 0.005% Ophthalmic Solution 1 Drop(s) Both EYES at bedtime  levETIRAcetam  IVPB 500 milliGRAM(s) IV Intermittent every 12 hours  polyethylene glycol 3350 17 Gram(s) Oral two times a day  senna 2 Tablet(s) Oral at bedtime  sodium chloride 0.9%. 1000 milliLiter(s) IV Continuous <Continuous>                            14.4   6.25  )-----------( 163      ( 16 Jun 2023 03:09 )             43.0     06-16    137  |  102  |  18  ----------------------------<  97  4.3   |  25  |  0.88    Ca    9.5      16 Jun 2023 03:09  Phos  3.5     06-16  Mg     2.2     06-16    TPro  7.6  /  Alb  4.4  /  TBili  0.7  /  DBili  x   /  AST  30  /  ALT  33  /  AlkPhos  132<H>  06-15      DEVICES:   [] Restraints [] ET tube [] central line [] arterial line [] pinto [] NGT/OGT [] EVD [] LD [] DIANNE/HMV [] Trach [] PEG [] Chest Tube [] other:

## 2023-06-16 NOTE — CHART NOTE - NSCHARTNOTEFT_GEN_A_CORE
CAPRINI SCORE [CLOT]    AGE RELATED RISK FACTORS                                                       MOBILITY RELATED FACTORS  [ ] Age 41-60 years                                            (1 Point)                  [ ] Bed rest                                                        (1 Point)  [ ] Age: 61-74 years                                           (2 Points)                 [ ] Plaster cast                                                   (2 Points)  [x ] Age= 75 years                                              (3 Points)                 [ ] Bed bound for more than 72 hours                 (2 Points)    DISEASE RELATED RISK FACTORS                                               GENDER SPECIFIC FACTORS  [ ] Edema in the lower extremities                       (1 Point)                  [ ] Pregnancy                                                     (1 Point)  [ ] Varicose veins                                               (1 Point)                  [ ] Post-partum < 6 weeks                                   (1 Point)             [ ] BMI > 25 Kg/m2                                            (1 Point)                  [ ] Hormonal therapy  or oral contraception          (1 Point)                 [ ] Sepsis (in the previous month)                        (1 Point)                  [ ] History of pregnancy complications                 (1 point)  [ ] Pneumonia or serious lung disease                                               [ ] Unexplained or recurrent                     (1 Point)           (in the previous month)                               (1 Point)  [ ] Abnormal pulmonary function test                     (1 Point)                 SURGERY RELATED RISK FACTORS  [ ] Acute myocardial infarction                              (1 Point)                 [ ]  Section                                             (1 Point)  [ ] Congestive heart failure (in the previous month)  (1 Point)               [ ] Minor surgery                                                  (1 Point)   [ ] Inflammatory bowel disease                             (1 Point)                 [ ] Arthroscopic surgery                                        (2 Points)  [ ] Central venous access                                      (2 Points)                [ ] General surgery lasting more than 45 minutes   (2 Points)       [ ] Stroke (in the previous month)                          (5 Points)               [ ] Elective arthroplasty                                         (5 Points)                                                                                                                                               HEMATOLOGY RELATED FACTORS                                                 TRAUMA RELATED RISK FACTORS  [ ] Prior episodes of VTE                                     (3 Points)                [ ] Fracture of the hip, pelvis, or leg                       (5 Points)  [ ] Positive family history for VTE                         (3 Points)                 [ ] Acute spinal cord injury (in the previous month)  (5 Points)  [ ] Prothrombin 35360 A                                     (3 Points)                 [ ] Paralysis  (less than 1 month)                             (5 Points)  [ ] Factor V Leiden                                             (3 Points)                  [ ] Multiple Trauma within 1 month                        (5 Points)  [ ] Lupus anticoagulants                                     (3 Points)                                                           [ ] Anticardiolipin antibodies                               (3 Points)                                                       [ ] High homocysteine in the blood                      (3 Points)                                             [ ] Other congenital or acquired thrombophilia      (3 Points)                                                [ ] Heparin induced thrombocytopenia                  (3 Points)                                          Total Score [      3    ]  Chemical DVT ppx deffered given SDH    Caprini Score 0 - 2:  Low Risk, No VTE Prophylaxis required for most patients, encourage ambulation  Caprini Score 3 - 6:  At Risk, pharmacologic VTE prophylaxis is indicated for most patients (in the absence of a contraindication)  Caprini Score Greater than or = 7:  High Risk, pharmacologic VTE prophylaxis is indicated for most patients (in the absence of a contraindication)

## 2023-06-16 NOTE — DISCHARGE NOTE NURSING/CASE MANAGEMENT/SOCIAL WORK - NSDCPEFALRISK_GEN_ALL_CORE
For information on Fall & Injury Prevention, visit: [email or web address] OR  [email or web address] OR  https://www.cdc.gov/steadi/patient.html

## 2023-06-16 NOTE — PHYSICAL THERAPY INITIAL EVALUATION ADULT - TRANSFER TRAINING, PT EVAL
GOAL: Patient will perform sit to stand transfers independently with least restrictive assistive device in 2 weeks with proper hand placement. Universal Safety Interventions

## 2023-06-16 NOTE — CHART NOTE - NSCHARTNOTEFT_GEN_A_CORE
Interventional Neuro Radiology  Pre-Procedure Note GENESIS      This is an 83 year old right hand dominant male Kinyarwanda speaking, brought to the ED today by his grand daughter. Patient fell about 3 weeks ago and hit the right side of head and was seen at this ED and got sutures placed. Patient's family noted subtle AMS, left side weakness. Patient is transported to Neuro IR for a catheter cerebral angiogram and embolization of MMA.     Allergies: NKDA  PMHx: Fatty Liver Dz  PSHX:   Exam:  Croatian speaking.  AAOx3. Speech clear and appropriate. Pupils pinpoint OU. +Left facial. +follows commands, FRANCO 5/5 except LUE 3/5.                        14.3   6.30  )-----------( 204      ( 15 Dimitri 2023 21:21 )             43.5     06-15    137  |  101  |  19  ----------------------------<  94  4.1   |  23  |  0.89    Ca    9.6      15 Dimitri 2023 21:21    TPro  7.6  /  Alb  4.4  /  TBili  0.7  /  DBili  x   /  AST  30  /  ALT  33  /  AlkPhos  132<H>  06-15    PT/INR - ( 15 Dimitri 2023 21:21 )   PT: 11.5 sec;   INR: 0.99 ratio    PTT - ( 15 Dimitri 2023 21:21 )  PTT:27.6 sec   (16 Jun 2023 02:05)      Allergies: No Known Allergies      Current Medications: acetaminophen     Tablet 650 milliGRAM(s) Oral every 6 hours PRN  bisacodyl 5 milliGRAM(s) Oral at bedtime  chlorhexidine 4% Liquid 1 Application(s) Topical daily  famotidine    Tablet 20 milliGRAM(s) Oral daily  latanoprost 0.005% Ophthalmic Solution 1 Drop(s) Both EYES at bedtime  levETIRAcetam  IVPB 500 milliGRAM(s) IV Intermittent every 12 hours  polyethylene glycol 3350 17 Gram(s) Oral two times a day  senna 2 Tablet(s) Oral at bedtime  sodium chloride 0.9%. 1000 milliLiter(s) IV Continuous       Labs:                         14.4   6.25  )-----------( 163      ( 16 Jun 2023 03:09 )             43.0       06-16    137  |  102  |  18  ----------------------------<  97  4.3   |  25  |  0.88    Ca    9.5      16 Jun 2023 03:09  Phos  3.5     06-16  Mg     2.2     06-16    TPro  7.6  /  Alb  4.4  /  TBili  0.7  /  DBili  x   /  AST  30  /  ALT  33  /  AlkPhos  132<H>  06-15    Blood Bank: 0 positive     ACC: [id number] EXAM:  CT BRAIN   ORDERED BY: DELFINA MEDINA   PROCEDURE DATE:  06/16/2023    INTERPRETATION:  CLINICAL INDICATION: Follow-up right frontal parietal   mixed density subdural hematoma    IMPRESSION: No change in mixed density right frontal parietal subdural   hematoma compared with 12:56 AM.      Assessment/Plan:   This is a 83 year old right hand dominant male with a right subdural hematoma who presents to Neuro IR for a catheter cerebral angiogram and embolization of MMA. Procedure, goals, risks, benefits and alternatives were discussed with patient and patient's family. All questions were answered. Risks include but are not limited to stroke, vessel injury, hemorrhage, and or right groin hematoma. Patient demonstrates understanding of all risks involved with this procedure and wishes to continue. Appropriate consent was obtained from patient and consent is in the patient's chart.

## 2023-06-16 NOTE — PHYSICAL THERAPY INITIAL EVALUATION ADULT - PERTINENT HX OF CURRENT PROBLEM, REHAB EVAL
82 y/o male Guyanese speaking, PMHx glaucoma, fall 3 months ago, presents with another fall 2 days ago c/b left sided weakness and confusion, found to have 2.3cm R frontal/parietal SDH, and small left 0.9cm SDH, with mass effect and mild uncal herniation. Pt is an 82 y/o male Danish speaking, PMHx glaucoma, fall 3 months ago, presents with another fall 2 days ago c/b left sided weakness and confusion, found to have 2.3cm R frontal/parietal SDH, and small left 0.9cm SDH, with mass effect and mild uncal herniation. Pt is s/p Right side twist bishop hole w/ SD drain placement, B/L MMAE 23.    Imagin/18 CT Head: Bilateral mixed attenuation subdural hematomas are evolving status post bilateral middle meningeal artery embolization and right subdural drain removal.

## 2023-06-16 NOTE — PHYSICAL THERAPY INITIAL EVALUATION ADULT - IMPAIRMENTS FOUND, PT EVAL
Consent Type: Consent 1 (Standard) aerobic capacity/endurance/gait, locomotion, and balance/muscle strength

## 2023-06-16 NOTE — PRE-ANESTHESIA EVALUATION ADULT - HEIGHT IN INCHES
[Midline] : trachea located in midline position [Laryngoscopy Performed] : laryngoscopy was performed, see procedure section for findings [Normal] : no rashes [FreeTextEntry1] : stretcher-bound, on oxygen and ventilator through trach, no retractions or distress [de-identified] : trach tube in place with multiple gauze and loose , 6 LPC trach  [de-identified] : mildly raspy voice, hyperfunctional, easily understood 0

## 2023-06-17 LAB
ANION GAP SERPL CALC-SCNC: 13 MMOL/L — SIGNIFICANT CHANGE UP (ref 5–17)
BASOPHILS # BLD AUTO: 0.03 K/UL — SIGNIFICANT CHANGE UP (ref 0–0.2)
BASOPHILS NFR BLD AUTO: 0.3 % — SIGNIFICANT CHANGE UP (ref 0–2)
BUN SERPL-MCNC: 22 MG/DL — SIGNIFICANT CHANGE UP (ref 7–23)
CALCIUM SERPL-MCNC: 9.1 MG/DL — SIGNIFICANT CHANGE UP (ref 8.4–10.5)
CHLORIDE SERPL-SCNC: 103 MMOL/L — SIGNIFICANT CHANGE UP (ref 96–108)
CO2 SERPL-SCNC: 23 MMOL/L — SIGNIFICANT CHANGE UP (ref 22–31)
CREAT SERPL-MCNC: 0.96 MG/DL — SIGNIFICANT CHANGE UP (ref 0.5–1.3)
CULTURE RESULTS: SIGNIFICANT CHANGE UP
EGFR: 78 ML/MIN/1.73M2 — SIGNIFICANT CHANGE UP
EOSINOPHIL # BLD AUTO: 0.01 K/UL — SIGNIFICANT CHANGE UP (ref 0–0.5)
EOSINOPHIL NFR BLD AUTO: 0.1 % — SIGNIFICANT CHANGE UP (ref 0–6)
GLUCOSE SERPL-MCNC: 122 MG/DL — HIGH (ref 70–99)
HCT VFR BLD CALC: 40.4 % — SIGNIFICANT CHANGE UP (ref 39–50)
HGB BLD-MCNC: 13.8 G/DL — SIGNIFICANT CHANGE UP (ref 13–17)
IMM GRANULOCYTES NFR BLD AUTO: 0.3 % — SIGNIFICANT CHANGE UP (ref 0–0.9)
LYMPHOCYTES # BLD AUTO: 2 K/UL — SIGNIFICANT CHANGE UP (ref 1–3.3)
LYMPHOCYTES # BLD AUTO: 22.7 % — SIGNIFICANT CHANGE UP (ref 13–44)
MAGNESIUM SERPL-MCNC: 2.3 MG/DL — SIGNIFICANT CHANGE UP (ref 1.6–2.6)
MCHC RBC-ENTMCNC: 32.5 PG — SIGNIFICANT CHANGE UP (ref 27–34)
MCHC RBC-ENTMCNC: 34.2 GM/DL — SIGNIFICANT CHANGE UP (ref 32–36)
MCV RBC AUTO: 95.3 FL — SIGNIFICANT CHANGE UP (ref 80–100)
MONOCYTES # BLD AUTO: 0.93 K/UL — HIGH (ref 0–0.9)
MONOCYTES NFR BLD AUTO: 10.5 % — SIGNIFICANT CHANGE UP (ref 2–14)
NEUTROPHILS # BLD AUTO: 5.83 K/UL — SIGNIFICANT CHANGE UP (ref 1.8–7.4)
NEUTROPHILS NFR BLD AUTO: 66.1 % — SIGNIFICANT CHANGE UP (ref 43–77)
NRBC # BLD: 0 /100 WBCS — SIGNIFICANT CHANGE UP (ref 0–0)
PHOSPHATE SERPL-MCNC: 3.2 MG/DL — SIGNIFICANT CHANGE UP (ref 2.5–4.5)
PLATELET # BLD AUTO: 208 K/UL — SIGNIFICANT CHANGE UP (ref 150–400)
POTASSIUM SERPL-MCNC: 4.2 MMOL/L — SIGNIFICANT CHANGE UP (ref 3.5–5.3)
POTASSIUM SERPL-SCNC: 4.2 MMOL/L — SIGNIFICANT CHANGE UP (ref 3.5–5.3)
RBC # BLD: 4.24 M/UL — SIGNIFICANT CHANGE UP (ref 4.2–5.8)
RBC # FLD: 12.7 % — SIGNIFICANT CHANGE UP (ref 10.3–14.5)
SODIUM SERPL-SCNC: 139 MMOL/L — SIGNIFICANT CHANGE UP (ref 135–145)
SPECIMEN SOURCE: SIGNIFICANT CHANGE UP
WBC # BLD: 8.83 K/UL — SIGNIFICANT CHANGE UP (ref 3.8–10.5)
WBC # FLD AUTO: 8.83 K/UL — SIGNIFICANT CHANGE UP (ref 3.8–10.5)

## 2023-06-17 PROCEDURE — 99233 SBSQ HOSP IP/OBS HIGH 50: CPT

## 2023-06-17 PROCEDURE — 73620 X-RAY EXAM OF FOOT: CPT | Mod: 26,LT

## 2023-06-17 PROCEDURE — 73590 X-RAY EXAM OF LOWER LEG: CPT | Mod: 26,LT

## 2023-06-17 PROCEDURE — 70450 CT HEAD/BRAIN W/O DYE: CPT | Mod: 26

## 2023-06-17 RX ADMIN — Medication 650 MILLIGRAM(S): at 01:55

## 2023-06-17 RX ADMIN — LATANOPROST 1 DROP(S): 0.05 SOLUTION/ DROPS OPHTHALMIC; TOPICAL at 22:13

## 2023-06-17 RX ADMIN — Medication 650 MILLIGRAM(S): at 01:25

## 2023-06-17 RX ADMIN — SENNA PLUS 2 TABLET(S): 8.6 TABLET ORAL at 22:11

## 2023-06-17 RX ADMIN — FAMOTIDINE 20 MILLIGRAM(S): 10 INJECTION INTRAVENOUS at 11:52

## 2023-06-17 RX ADMIN — POLYETHYLENE GLYCOL 3350 17 GRAM(S): 17 POWDER, FOR SOLUTION ORAL at 05:16

## 2023-06-17 RX ADMIN — LEVETIRACETAM 500 MILLIGRAM(S): 250 TABLET, FILM COATED ORAL at 05:16

## 2023-06-17 RX ADMIN — CHLORHEXIDINE GLUCONATE 1 APPLICATION(S): 213 SOLUTION TOPICAL at 22:12

## 2023-06-17 RX ADMIN — POLYETHYLENE GLYCOL 3350 17 GRAM(S): 17 POWDER, FOR SOLUTION ORAL at 17:19

## 2023-06-17 RX ADMIN — Medication 5 MILLIGRAM(S): at 22:10

## 2023-06-17 RX ADMIN — LEVETIRACETAM 500 MILLIGRAM(S): 250 TABLET, FILM COATED ORAL at 17:20

## 2023-06-17 NOTE — PROGRESS NOTE ADULT - ASSESSMENT
ASSESSMENT/PLAN:  cont right SD drain day 2/3; plan for CT in am    [] Patient is at high risk of neurologic deterioration/death due to:     Time seen:  Time spent: ___ [] critical care minutes

## 2023-06-17 NOTE — PROGRESS NOTE ADULT - ASSESSMENT
-POD1 Right side twist bishop hole w/ SD drain placement, B/L MMAE   -CT in AM   -Flat while SD drain in   -Keep -160

## 2023-06-17 NOTE — PROGRESS NOTE ADULT - SUBJECTIVE AND OBJECTIVE BOX
Patient seen and examined at bedside.    --Anticoagulation--    T(C): 36.7 (06-16-23 @ 23:00), Max: 36.9 (06-16-23 @ 02:38)  HR: 61 (06-17-23 @ 01:00) (52 - 79)  BP: 121/60 (06-17-23 @ 01:00) (114/66 - 169/76)  RR: 14 (06-17-23 @ 01:00) (11 - 19)  SpO2: 95% (06-17-23 @ 01:00) (95% - 100%)  Wt(kg): --    Exam: Greek-speaking, AOx3, EOMI, no facial, Right side 5/5, LUE 4+/5, LLE 5/5, SILT, Right groin soft

## 2023-06-17 NOTE — PROGRESS NOTE ADULT - SUBJECTIVE AND OBJECTIVE BOX
HPI:  Mr Hernandez is an 84 y/o male Yemeni speaking, brought to the ED today by his grand daughter.  Pt fell about 3 weeks ago and hit rt side of head and was seen at this ED and got sutures placed.  Today grand daughter noted pt with subtle AMS, left side weakness and pt just not himself and less conversant.  Pt took tylenol only for HA and is not on ASA or blood thinners.      NKDA    Meds: Eye drop OU BID (Family to bring name of med)    PMHx: Fatty Liver Dz                ETOH    Exam:  Azeri speaking.  AAOx3. Speech clear and appropriate. Pupils pinpoint OU. +Lt facial. +FC. FRANCO 5/5 except LUE 3/5. SLIT    < from: CT Angio Head w/ IV Cont (06.16.23 @ 01:24) >    CT HEAD: Acute mixed density right frontal and parietal convexity   subdural hematoma measuring up to 2.1 cm over the frontal convexity and   2.3 cm over the parietal convexity. Mass effect upon the right cerebrum   with 7-8mm shift of the midline towards the left and mild right uncal   herniation.    Left frontal convexity subdural hematoma measuring up to 0.9 cm in   maximum thickness.    No scalp soft tissue swelling/hematoma or acute osseous fracture.    CT CERVICAL SPINE: No acute cervical spine fracture or traumatic   malalignment. Cervical spondylosis.    CTA NECK:  No significant stenosis of the cervical carotid arteries based   on NASCET criteria. Patent cervical vertebral arteries. No evidence of   cervical carotid or vertebral artery dissection.    CTA HEAD:  No high-grade stenosis or occlusion of the major proximal   arterial branches. No evidence of an intracranial arterial aneurysm or   arteriovenous malformation on CTA. No evidence of active bleeding into   the subdural hematomas.    < end of copied text >                          14.3   6.30  )-----------( 204      ( 15 Dimitri 2023 21:21 )             43.5     06-15    137  |  101  |  19  ----------------------------<  94  4.1   |  23  |  0.89    Ca    9.6      15 Dimitri 2023 21:21    TPro  7.6  /  Alb  4.4  /  TBili  0.7  /  DBili  x   /  AST  30  /  ALT  33  /  AlkPhos  132<H>  06-15    PT/INR - ( 15 Dimitri 2023 21:21 )   PT: 11.5 sec;   INR: 0.99 ratio    PTT - ( 15 Dimitri 2023 21:21 )  PTT:27.6 sec   (16 Jun 2023 02:05)    SURGERY:   PAST MEDICAL HISTORY: No pertinent past medical history      PAST SURGICAL HISTORY: No significant past surgical history      FAMILY HISTORY:    ALLERGIES: No Known Allergies    **************************************  **************************************    OVERNIGHT EVENTS: [] None    ROS  Unobtainable due to mental status[] Negative []  Positives:    ADMISSION SCORES: GCS: HH: MF: NIHSS: RASS: CAM-ICU: ICP:    ICU Vital Signs Last 24 Hrs  T(C): 36.9 (17 Jun 2023 11:00), Max: 37.1 (17 Jun 2023 07:00)  T(F): 98.4 (17 Jun 2023 11:00), Max: 98.8 (17 Jun 2023 07:00)  HR: 62 (17 Jun 2023 11:00) (56 - 79)  BP: 135/67 (17 Jun 2023 11:00) (114/66 - 148/74)  BP(mean): 86 (17 Jun 2023 11:00) (75 - 94)  ABP: --  ABP(mean): --  RR: 15 (17 Jun 2023 11:00) (11 - 20)  SpO2: 96% (17 Jun 2023 11:00) (92% - 99%)    O2 Parameters below as of 17 Jun 2023 07:00  Patient On (Oxygen Delivery Method): room air           06-16 @ 07:01  -  06-17 @ 07:00  --------------------------------------------------------  IN: 1600 mL / OUT: 1200 mL / NET: 400 mL    06-17 @ 07:01  -  06-17 @ 13:00  --------------------------------------------------------  IN: 240 mL / OUT: 0 mL / NET: 240 mL           DEVICES: [] Restraints [] DIANNE/HMV []LD [] ET tube [] Trach [] Chest Tube [] A-line [] Mejia [] NGT [] Rectal Tube [] EVD [] CVL  [] ICP/LiCOx    NEUROIMAGING:     EEG REPORT:     MEDICATIONS:  acetaminophen     Tablet .. 650 milliGRAM(s) Oral every 6 hours PRN  bisacodyl 5 milliGRAM(s) Oral at bedtime  chlorhexidine 4% Liquid 1 Application(s) Topical daily  famotidine    Tablet 20 milliGRAM(s) Oral daily  latanoprost 0.005% Ophthalmic Solution 1 Drop(s) Both EYES at bedtime  levETIRAcetam 500 milliGRAM(s) Oral two times a day  polyethylene glycol 3350 17 Gram(s) Oral two times a day  senna 2 Tablet(s) Oral at bedtime      PHYSICAL EXAM:  a/ox3; left facial droop; right side 5/5, lue 3/5,, lle 4-/5      LABS:                        14.2   7.80  )-----------( 217      ( 16 Jun 2023 22:27 )             42.1    06-16    138  |  103  |  18  ----------------------------<  160<H>  4.2   |  22  |  0.91    Ca    8.8      16 Jun 2023 22:27  Phos  4.2     06-16  Mg     2.1     06-16    TPro  7.6  /  Alb  4.4  /  TBili  0.7  /  DBili  x   /  AST  30  /  ALT  33  /  AlkPhos  132<H>  06-15

## 2023-06-18 LAB
ANION GAP SERPL CALC-SCNC: 14 MMOL/L — SIGNIFICANT CHANGE UP (ref 5–17)
BASOPHILS # BLD AUTO: 0.02 K/UL — SIGNIFICANT CHANGE UP (ref 0–0.2)
BASOPHILS NFR BLD AUTO: 0.2 % — SIGNIFICANT CHANGE UP (ref 0–2)
BUN SERPL-MCNC: 23 MG/DL — SIGNIFICANT CHANGE UP (ref 7–23)
CALCIUM SERPL-MCNC: 9.3 MG/DL — SIGNIFICANT CHANGE UP (ref 8.4–10.5)
CHLORIDE SERPL-SCNC: 99 MMOL/L — SIGNIFICANT CHANGE UP (ref 96–108)
CO2 SERPL-SCNC: 23 MMOL/L — SIGNIFICANT CHANGE UP (ref 22–31)
CREAT SERPL-MCNC: 1.1 MG/DL — SIGNIFICANT CHANGE UP (ref 0.5–1.3)
EGFR: 67 ML/MIN/1.73M2 — SIGNIFICANT CHANGE UP
EOSINOPHIL # BLD AUTO: 0.07 K/UL — SIGNIFICANT CHANGE UP (ref 0–0.5)
EOSINOPHIL NFR BLD AUTO: 0.8 % — SIGNIFICANT CHANGE UP (ref 0–6)
GLUCOSE SERPL-MCNC: 122 MG/DL — HIGH (ref 70–99)
HCT VFR BLD CALC: 42.2 % — SIGNIFICANT CHANGE UP (ref 39–50)
HGB BLD-MCNC: 14.5 G/DL — SIGNIFICANT CHANGE UP (ref 13–17)
IMM GRANULOCYTES NFR BLD AUTO: 0.2 % — SIGNIFICANT CHANGE UP (ref 0–0.9)
LYMPHOCYTES # BLD AUTO: 2.13 K/UL — SIGNIFICANT CHANGE UP (ref 1–3.3)
LYMPHOCYTES # BLD AUTO: 24 % — SIGNIFICANT CHANGE UP (ref 13–44)
MAGNESIUM SERPL-MCNC: 2.2 MG/DL — SIGNIFICANT CHANGE UP (ref 1.6–2.6)
MCHC RBC-ENTMCNC: 33.2 PG — SIGNIFICANT CHANGE UP (ref 27–34)
MCHC RBC-ENTMCNC: 34.4 GM/DL — SIGNIFICANT CHANGE UP (ref 32–36)
MCV RBC AUTO: 96.6 FL — SIGNIFICANT CHANGE UP (ref 80–100)
MONOCYTES # BLD AUTO: 0.88 K/UL — SIGNIFICANT CHANGE UP (ref 0–0.9)
MONOCYTES NFR BLD AUTO: 9.9 % — SIGNIFICANT CHANGE UP (ref 2–14)
NEUTROPHILS # BLD AUTO: 5.74 K/UL — SIGNIFICANT CHANGE UP (ref 1.8–7.4)
NEUTROPHILS NFR BLD AUTO: 64.9 % — SIGNIFICANT CHANGE UP (ref 43–77)
NRBC # BLD: 0 /100 WBCS — SIGNIFICANT CHANGE UP (ref 0–0)
PHOSPHATE SERPL-MCNC: 4.1 MG/DL — SIGNIFICANT CHANGE UP (ref 2.5–4.5)
PLATELET # BLD AUTO: 207 K/UL — SIGNIFICANT CHANGE UP (ref 150–400)
POTASSIUM SERPL-MCNC: 4.4 MMOL/L — SIGNIFICANT CHANGE UP (ref 3.5–5.3)
POTASSIUM SERPL-SCNC: 4.4 MMOL/L — SIGNIFICANT CHANGE UP (ref 3.5–5.3)
RBC # BLD: 4.37 M/UL — SIGNIFICANT CHANGE UP (ref 4.2–5.8)
RBC # FLD: 12.7 % — SIGNIFICANT CHANGE UP (ref 10.3–14.5)
SODIUM SERPL-SCNC: 136 MMOL/L — SIGNIFICANT CHANGE UP (ref 135–145)
WBC # BLD: 8.86 K/UL — SIGNIFICANT CHANGE UP (ref 3.8–10.5)
WBC # FLD AUTO: 8.86 K/UL — SIGNIFICANT CHANGE UP (ref 3.8–10.5)

## 2023-06-18 PROCEDURE — 99233 SBSQ HOSP IP/OBS HIGH 50: CPT

## 2023-06-18 RX ADMIN — LATANOPROST 1 DROP(S): 0.05 SOLUTION/ DROPS OPHTHALMIC; TOPICAL at 21:40

## 2023-06-18 RX ADMIN — POLYETHYLENE GLYCOL 3350 17 GRAM(S): 17 POWDER, FOR SOLUTION ORAL at 05:50

## 2023-06-18 RX ADMIN — SENNA PLUS 2 TABLET(S): 8.6 TABLET ORAL at 21:40

## 2023-06-18 RX ADMIN — POLYETHYLENE GLYCOL 3350 17 GRAM(S): 17 POWDER, FOR SOLUTION ORAL at 17:22

## 2023-06-18 RX ADMIN — CHLORHEXIDINE GLUCONATE 1 APPLICATION(S): 213 SOLUTION TOPICAL at 12:15

## 2023-06-18 RX ADMIN — LEVETIRACETAM 500 MILLIGRAM(S): 250 TABLET, FILM COATED ORAL at 17:22

## 2023-06-18 RX ADMIN — FAMOTIDINE 20 MILLIGRAM(S): 10 INJECTION INTRAVENOUS at 12:15

## 2023-06-18 RX ADMIN — Medication 5 MILLIGRAM(S): at 21:40

## 2023-06-18 RX ADMIN — LEVETIRACETAM 500 MILLIGRAM(S): 250 TABLET, FILM COATED ORAL at 05:50

## 2023-06-18 NOTE — PROGRESS NOTE ADULT - ASSESSMENT
82y/o male POD0, for burrhole and subdural drain placement, and B/L MMA embo., LLE xray films done, stable. Dopplers pending for DVT r/o. Continue to monitor exam and visual fields    Neuro:  q2 neuro  pain control w/ tylenol  ct head am, possible drian pull  monitor drain output, Keep flat while drains are in  keppra 500 BID for seizure ppx    CV  100-160    Pulm  RA  Incentive spirometry    GI: miralax and senna, dulcomax for BM  Voiding with pinto  Diet, soft and bite size    Renal:  IVL    Endo  -180    Heme:  SCDs, hold chem dvt ppx until drains out  duplex-p    ID:  afebrile

## 2023-06-18 NOTE — PROGRESS NOTE ADULT - SUBJECTIVE AND OBJECTIVE BOX
NSCU ATTENDING -- ADDITIONAL PROGRESS NOTE    Nighttime rounds were performed -- please refer to earlier Progress Note for HPI details.    T(C): 36.5 (06-18-23 @ 19:00), Max: 36.6 (06-17-23 @ 23:00)  HR: 74 (06-18-23 @ 19:00) (56 - 74)  BP: 137/76 (06-18-23 @ 19:00) (109/86 - 145/78)  RR: 23 (06-18-23 @ 19:00) (15 - 23)  SpO2: 93% (06-18-23 @ 19:00) (93% - 100%)  Wt(kg): --    Relevant labwork and imaging reviewed.    CT brain in AM.  possibly d/c drain at that time.

## 2023-06-18 NOTE — PROGRESS NOTE ADULT - SUBJECTIVE AND OBJECTIVE BOX
NSCU Progress Note    Assessment/Hospital Course:        24 Hour Events/Subjective:  - POD1        REVIEW OF SYSTEMS:  - negative except as above    VITALS:   - T(C): 36.6 (06-18-23 @ 03:00), Max: 37.1 (06-17-23 @ 07:00)  T(F): 97.8 (06-18-23 @ 03:00), Max: 98.8 (06-17-23 @ 07:00)  HR: 56 (06-18-23 @ 03:00) (56 - 62)  BP: 137/67 (06-18-23 @ 03:00) (121/69 - 140/96)  ABP: --  ABP(mean): --  RR: 15 (06-18-23 @ 03:00) (15 - 20)  SpO2: 95% (06-18-23 @ 03:00) (95% - 99%)      IMAGING/DATA:   - Reviewed          PHYSICAL EXAM:    General: calm  CVS: RRR  Pulm: CTAB  GI: Soft, NTND  Extremities: No LE Edema  Neuro: AOx3, PERRL, EOMI, facial symmetrical, fluent speech, motor 5/5 throughout, no PND, sensation in tact   NSCU Progress Note    Assessment/Hospital Course:        24 Hour Events/Subjective:  - POD2 rt bishop hole        REVIEW OF SYSTEMS:  - negative except as above    VITALS:   - T(C): 36.6 (06-18-23 @ 03:00), Max: 37.1 (06-17-23 @ 07:00)  T(F): 97.8 (06-18-23 @ 03:00), Max: 98.8 (06-17-23 @ 07:00)  HR: 56 (06-18-23 @ 03:00) (56 - 62)  BP: 137/67 (06-18-23 @ 03:00) (121/69 - 140/96)  ABP: --  ABP(mean): --  RR: 15 (06-18-23 @ 03:00) (15 - 20)  SpO2: 95% (06-18-23 @ 03:00) (95% - 99%)      IMAGING/DATA:   - Reviewed          PHYSICAL EXAM:    General: calm  CVS: RRR  Pulm: CTAB  GI: Soft, NTND  Extremities: No LE Edema  Neuro: AOx3, PERRL, EOMI, facial symmetrical, fluent speech, motor 5/5 throughout, no PND, sensation in tact

## 2023-06-19 LAB
APTT BLD: 27.5 SEC — SIGNIFICANT CHANGE UP (ref 27.5–35.5)
INR BLD: 1 RATIO — SIGNIFICANT CHANGE UP (ref 0.88–1.16)
MRSA PCR RESULT.: SIGNIFICANT CHANGE UP
PROTHROM AB SERPL-ACNC: 11.5 SEC — SIGNIFICANT CHANGE UP (ref 10.5–13.4)
S AUREUS DNA NOSE QL NAA+PROBE: SIGNIFICANT CHANGE UP

## 2023-06-19 PROCEDURE — 99232 SBSQ HOSP IP/OBS MODERATE 35: CPT

## 2023-06-19 PROCEDURE — 70450 CT HEAD/BRAIN W/O DYE: CPT | Mod: 26

## 2023-06-19 RX ADMIN — Medication 650 MILLIGRAM(S): at 12:34

## 2023-06-19 RX ADMIN — LEVETIRACETAM 500 MILLIGRAM(S): 250 TABLET, FILM COATED ORAL at 05:21

## 2023-06-19 RX ADMIN — POLYETHYLENE GLYCOL 3350 17 GRAM(S): 17 POWDER, FOR SOLUTION ORAL at 05:21

## 2023-06-19 RX ADMIN — FAMOTIDINE 20 MILLIGRAM(S): 10 INJECTION INTRAVENOUS at 12:03

## 2023-06-19 RX ADMIN — CHLORHEXIDINE GLUCONATE 1 APPLICATION(S): 213 SOLUTION TOPICAL at 12:03

## 2023-06-19 RX ADMIN — LATANOPROST 1 DROP(S): 0.05 SOLUTION/ DROPS OPHTHALMIC; TOPICAL at 21:09

## 2023-06-19 RX ADMIN — POLYETHYLENE GLYCOL 3350 17 GRAM(S): 17 POWDER, FOR SOLUTION ORAL at 17:16

## 2023-06-19 RX ADMIN — LEVETIRACETAM 500 MILLIGRAM(S): 250 TABLET, FILM COATED ORAL at 17:16

## 2023-06-19 RX ADMIN — Medication 650 MILLIGRAM(S): at 13:31

## 2023-06-19 RX ADMIN — Medication 10 MILLIGRAM(S): at 12:03

## 2023-06-19 NOTE — OCCUPATIONAL THERAPY INITIAL EVALUATION ADULT - DIAGNOSIS, OT EVAL
pt presents with decreased strength, postural control, and balance limiting their ability to engage in ADLs and functional tasks.

## 2023-06-19 NOTE — PROGRESS NOTE ADULT - SUBJECTIVE AND OBJECTIVE BOX
HPI:  Mr Hernandez is an 82 y/o male Brazilian speaking, brought to the ED today by his grand daughter.  Pt fell about 3 weeks ago and hit rt side of head and was seen at this ED and got sutures placed.  Today grand daughter noted pt with subtle AMS, left side weakness and pt just not himself and less conversant.  Pt took tylenol only for HA and is not on ASA or blood thinners.      NKDA    Meds: Eye drop OU BID (Family to bring name of med)    PMHx: Fatty Liver Dz                ETOH    Exam:  Amharic speaking.  AAOx3. Speech clear and appropriate. Pupils pinpoint OU. +Lt facial. +FC. FRANCO 5/5 except LUE 3/5. SLIT    < from: CT Angio Head w/ IV Cont (06.16.23 @ 01:24) >    CT HEAD: Acute mixed density right frontal and parietal convexity   subdural hematoma measuring up to 2.1 cm over the frontal convexity and   2.3 cm over the parietal convexity. Mass effect upon the right cerebrum   with 7-8mm shift of the midline towards the left and mild right uncal   herniation.    Left frontal convexity subdural hematoma measuring up to 0.9 cm in   maximum thickness.    No scalp soft tissue swelling/hematoma or acute osseous fracture.    CT CERVICAL SPINE: No acute cervical spine fracture or traumatic   malalignment. Cervical spondylosis.    CTA NECK:  No significant stenosis of the cervical carotid arteries based   on NASCET criteria. Patent cervical vertebral arteries. No evidence of   cervical carotid or vertebral artery dissection.    CTA HEAD:  No high-grade stenosis or occlusion of the major proximal   arterial branches. No evidence of an intracranial arterial aneurysm or   arteriovenous malformation on CTA. No evidence of active bleeding into   the subdural hematomas.    < end of copied text >                          14.3   6.30  )-----------( 204      ( 15 Dimitri 2023 21:21 )             43.5     06-15    137  |  101  |  19  ----------------------------<  94  4.1   |  23  |  0.89    Ca    9.6      15 Dimitri 2023 21:21    TPro  7.6  /  Alb  4.4  /  TBili  0.7  /  DBili  x   /  AST  30  /  ALT  33  /  AlkPhos  132<H>  06-15    PT/INR - ( 15 Dimitri 2023 21:21 )   PT: 11.5 sec;   INR: 0.99 ratio    PTT - ( 15 Dimitri 2023 21:21 )  PTT:27.6 sec   (16 Jun 2023 02:05)    SURGERY:   PAST MEDICAL HISTORY: No pertinent past medical history      PAST SURGICAL HISTORY: No significant past surgical history      FAMILY HISTORY:    ALLERGIES: No Known Allergies    **************************************  **************************************    OVERNIGHT EVENTS: [] None    ROS  Unobtainable due to mental status[] Negative []  Positives:    ADMISSION SCORES: GCS: HH: MF: NIHSS: RASS: CAM-ICU: ICP:    ICU Vital Signs Last 24 Hrs  T(C): 36.3 (19 Jun 2023 11:00), Max: 36.7 (18 Jun 2023 23:00)  T(F): 97.4 (19 Jun 2023 11:00), Max: 98.1 (18 Jun 2023 23:00)  HR: 65 (19 Jun 2023 11:00) (60 - 74)  BP: 125/73 (19 Jun 2023 11:00) (118/60 - 147/76)  BP(mean): 88 (19 Jun 2023 11:00) (73 - 96)  ABP: --  ABP(mean): --  RR: 15 (19 Jun 2023 11:00) (15 - 23)  SpO2: 96% (19 Jun 2023 11:00) (93% - 100%)       06-18 @ 07:01  -  06-19 @ 07:00  --------------------------------------------------------  IN: 1810 mL / OUT: 2665 mL / NET: -855 mL    06-19 @ 07:01  - 06-19 @ 13:16  --------------------------------------------------------  IN: 450 mL / OUT: 700 mL / NET: -250 mL           DEVICES: [] Restraints [] DIANNE/HMV []LD [] ET tube [] Trach [] Chest Tube [] A-line [] Mejia [] NGT [] Rectal Tube [] EVD [] CVL  [] ICP/LiCOx    NEUROIMAGING:     EEG REPORT:     MEDICATIONS:  acetaminophen     Tablet .. 650 milliGRAM(s) Oral every 6 hours PRN  bisacodyl 5 milliGRAM(s) Oral at bedtime  chlorhexidine 4% Liquid 1 Application(s) Topical daily  famotidine    Tablet 20 milliGRAM(s) Oral daily  latanoprost 0.005% Ophthalmic Solution 1 Drop(s) Both EYES at bedtime  levETIRAcetam 500 milliGRAM(s) Oral two times a day  polyethylene glycol 3350 17 Gram(s) Oral two times a day  senna 2 Tablet(s) Oral at bedtime      PHYSICAL EXAM:  alet; ox3; fluent fc  pupils=  eomi   face =  no drift        LABS:                        14.5   8.86  )-----------( 207      ( 18 Jun 2023 22:13 )             42.2    06-18    136  |  99  |  23  ----------------------------<  122<H>  4.4   |  23  |  1.10    Ca    9.3      18 Jun 2023 22:13  Phos  4.1     06-18  Mg     2.2     06-18

## 2023-06-19 NOTE — OCCUPATIONAL THERAPY INITIAL EVALUATION ADULT - PERTINENT HX OF CURRENT PROBLEM, REHAB EVAL
Pt rec'd in bedside chair, in NAD, granddaughters at bedside. +IVL, +ICU monitoring, +[place] catheter.  Pt is an 82 y/o male Serbian speaking, PMHx glaucoma, fall 3 months ago, presents with another fall 2 days ago c/b left sided weakness and confusion, found to have 2.3cm R frontal/parietal SDH, and small left 0.9cm SDH, with mass effect and mild uncal herniation. Pt is s/p Right side twist bishop hole w/ SD drain placement, B/L MMAE 23.Imagin/18 CT Head: Bilateral mixed attenuation subdural hematomas are evolving status post bilateral middle meningeal artery embolization and right subdural drain removal.

## 2023-06-19 NOTE — PROGRESS NOTE ADULT - ASSESSMENT
ASSESSMENT/PLAN:  SD drain dc'd; will tx to floor today    [] Patient is at high risk of neurologic deterioration/death due to:     Time seen:  Time spent: ___ [] critical care minutes

## 2023-06-19 NOTE — PROGRESS NOTE ADULT - ASSESSMENT
-POD3 Right side twist bishop hole w/ SD drain placement, B/L MMAE   -Flat while SD drain in   -Keep -160  -Potentially d/c SD drain in AM

## 2023-06-19 NOTE — OCCUPATIONAL THERAPY INITIAL EVALUATION ADULT - LIVES WITH, PROFILE
pt lives in a private house with family with 5 steps to enter and a flight of steps to bedroom. pt has full bathroom on first floor, 1/2 bath on second floor. prior to admission, pt was independent in all ADLs and functional tasks without AE. -drives. pt reported having 3 recent falls./children/other relative/spouse

## 2023-06-19 NOTE — PROGRESS NOTE ADULT - SUBJECTIVE AND OBJECTIVE BOX
Patient seen and examined at bedside.    --Anticoagulation--    T(C): 36.7 (06-18-23 @ 23:00), Max: 36.7 (06-18-23 @ 23:00)  HR: 65 (06-18-23 @ 23:00) (56 - 74)  BP: 118/60 (06-18-23 @ 23:00) (109/86 - 145/78)  RR: 20 (06-18-23 @ 23:00) (15 - 23)  SpO2: 100% (06-18-23 @ 23:00) (93% - 100%)  Wt(kg): --    Exam: AOx3, EOMI, no facial/drift, FRANCO 5/5, SILT

## 2023-06-20 ENCOUNTER — TRANSCRIPTION ENCOUNTER (OUTPATIENT)
Age: 83
End: 2023-06-20

## 2023-06-20 VITALS
OXYGEN SATURATION: 100 % | SYSTOLIC BLOOD PRESSURE: 150 MMHG | TEMPERATURE: 99 F | RESPIRATION RATE: 18 BRPM | HEART RATE: 81 BPM | DIASTOLIC BLOOD PRESSURE: 84 MMHG

## 2023-06-20 DIAGNOSIS — S06.5XAA TRAUMATIC SUBDURAL HEMORRHAGE WITH LOSS OF CONSCIOUSNESS STATUS UNKNOWN, INITIAL ENCOUNTER: ICD-10-CM

## 2023-06-20 DIAGNOSIS — Z29.9 ENCOUNTER FOR PROPHYLACTIC MEASURES, UNSPECIFIED: ICD-10-CM

## 2023-06-20 LAB
ANION GAP SERPL CALC-SCNC: 15 MMOL/L — SIGNIFICANT CHANGE UP (ref 5–17)
BUN SERPL-MCNC: 22 MG/DL — SIGNIFICANT CHANGE UP (ref 7–23)
CALCIUM SERPL-MCNC: 9.4 MG/DL — SIGNIFICANT CHANGE UP (ref 8.4–10.5)
CHLORIDE SERPL-SCNC: 97 MMOL/L — SIGNIFICANT CHANGE UP (ref 96–108)
CO2 SERPL-SCNC: 25 MMOL/L — SIGNIFICANT CHANGE UP (ref 22–31)
CREAT SERPL-MCNC: 1.05 MG/DL — SIGNIFICANT CHANGE UP (ref 0.5–1.3)
EGFR: 70 ML/MIN/1.73M2 — SIGNIFICANT CHANGE UP
GLUCOSE SERPL-MCNC: 108 MG/DL — HIGH (ref 70–99)
HCT VFR BLD CALC: 44.7 % — SIGNIFICANT CHANGE UP (ref 39–50)
HGB BLD-MCNC: 14.7 G/DL — SIGNIFICANT CHANGE UP (ref 13–17)
MAGNESIUM SERPL-MCNC: 2.4 MG/DL — SIGNIFICANT CHANGE UP (ref 1.6–2.6)
MCHC RBC-ENTMCNC: 32.3 PG — SIGNIFICANT CHANGE UP (ref 27–34)
MCHC RBC-ENTMCNC: 32.9 GM/DL — SIGNIFICANT CHANGE UP (ref 32–36)
MCV RBC AUTO: 98.2 FL — SIGNIFICANT CHANGE UP (ref 80–100)
NRBC # BLD: 0 /100 WBCS — SIGNIFICANT CHANGE UP (ref 0–0)
PHOSPHATE SERPL-MCNC: 3.2 MG/DL — SIGNIFICANT CHANGE UP (ref 2.5–4.5)
PLATELET # BLD AUTO: 201 K/UL — SIGNIFICANT CHANGE UP (ref 150–400)
POTASSIUM SERPL-MCNC: 5.1 MMOL/L — SIGNIFICANT CHANGE UP (ref 3.5–5.3)
POTASSIUM SERPL-SCNC: 5.1 MMOL/L — SIGNIFICANT CHANGE UP (ref 3.5–5.3)
RBC # BLD: 4.55 M/UL — SIGNIFICANT CHANGE UP (ref 4.2–5.8)
RBC # FLD: 12.5 % — SIGNIFICANT CHANGE UP (ref 10.3–14.5)
SODIUM SERPL-SCNC: 137 MMOL/L — SIGNIFICANT CHANGE UP (ref 135–145)
WBC # BLD: 8.68 K/UL — SIGNIFICANT CHANGE UP (ref 3.8–10.5)
WBC # FLD AUTO: 8.68 K/UL — SIGNIFICANT CHANGE UP (ref 3.8–10.5)

## 2023-06-20 PROCEDURE — C1769: CPT

## 2023-06-20 PROCEDURE — 85018 HEMOGLOBIN: CPT

## 2023-06-20 PROCEDURE — 84100 ASSAY OF PHOSPHORUS: CPT

## 2023-06-20 PROCEDURE — 75894 X-RAYS TRANSCATH THERAPY: CPT

## 2023-06-20 PROCEDURE — 99231 SBSQ HOSP IP/OBS SF/LOW 25: CPT

## 2023-06-20 PROCEDURE — 80053 COMPREHEN METABOLIC PANEL: CPT

## 2023-06-20 PROCEDURE — 99232 SBSQ HOSP IP/OBS MODERATE 35: CPT

## 2023-06-20 PROCEDURE — 80048 BASIC METABOLIC PNL TOTAL CA: CPT

## 2023-06-20 PROCEDURE — 82947 ASSAY GLUCOSE BLOOD QUANT: CPT

## 2023-06-20 PROCEDURE — 84295 ASSAY OF SERUM SODIUM: CPT

## 2023-06-20 PROCEDURE — 75989 ABSCESS DRAINAGE UNDER X-RAY: CPT

## 2023-06-20 PROCEDURE — 85730 THROMBOPLASTIN TIME PARTIAL: CPT

## 2023-06-20 PROCEDURE — C1894: CPT

## 2023-06-20 PROCEDURE — 71045 X-RAY EXAM CHEST 1 VIEW: CPT

## 2023-06-20 PROCEDURE — 73590 X-RAY EXAM OF LOWER LEG: CPT

## 2023-06-20 PROCEDURE — 73620 X-RAY EXAM OF FOOT: CPT

## 2023-06-20 PROCEDURE — 83036 HEMOGLOBIN GLYCOSYLATED A1C: CPT

## 2023-06-20 PROCEDURE — 97161 PT EVAL LOW COMPLEX 20 MIN: CPT

## 2023-06-20 PROCEDURE — C1889: CPT

## 2023-06-20 PROCEDURE — 87641 MR-STAPH DNA AMP PROBE: CPT

## 2023-06-20 PROCEDURE — 72170 X-RAY EXAM OF PELVIS: CPT

## 2023-06-20 PROCEDURE — 97165 OT EVAL LOW COMPLEX 30 MIN: CPT

## 2023-06-20 PROCEDURE — 84132 ASSAY OF SERUM POTASSIUM: CPT

## 2023-06-20 PROCEDURE — 83605 ASSAY OF LACTIC ACID: CPT

## 2023-06-20 PROCEDURE — 81001 URINALYSIS AUTO W/SCOPE: CPT

## 2023-06-20 PROCEDURE — 86901 BLOOD TYPING SEROLOGIC RH(D): CPT

## 2023-06-20 PROCEDURE — C1887: CPT

## 2023-06-20 PROCEDURE — 83735 ASSAY OF MAGNESIUM: CPT

## 2023-06-20 PROCEDURE — C9399: CPT

## 2023-06-20 PROCEDURE — 36224 PLACE CATH CAROTD ART: CPT

## 2023-06-20 PROCEDURE — 80307 DRUG TEST PRSMV CHEM ANLYZR: CPT

## 2023-06-20 PROCEDURE — 87086 URINE CULTURE/COLONY COUNT: CPT

## 2023-06-20 PROCEDURE — 84478 ASSAY OF TRIGLYCERIDES: CPT

## 2023-06-20 PROCEDURE — 86900 BLOOD TYPING SEROLOGIC ABO: CPT

## 2023-06-20 PROCEDURE — 82435 ASSAY OF BLOOD CHLORIDE: CPT

## 2023-06-20 PROCEDURE — C1760: CPT

## 2023-06-20 PROCEDURE — 75898 FOLLOW-UP ANGIOGRAPHY: CPT

## 2023-06-20 PROCEDURE — 70450 CT HEAD/BRAIN W/O DYE: CPT

## 2023-06-20 PROCEDURE — 36226 PLACE CATH VERTEBRAL ART: CPT

## 2023-06-20 PROCEDURE — 85014 HEMATOCRIT: CPT

## 2023-06-20 PROCEDURE — 82330 ASSAY OF CALCIUM: CPT

## 2023-06-20 PROCEDURE — 85025 COMPLETE CBC W/AUTO DIFF WBC: CPT

## 2023-06-20 PROCEDURE — 82803 BLOOD GASES ANY COMBINATION: CPT

## 2023-06-20 PROCEDURE — 36227 PLACE CATH XTRNL CAROTID: CPT

## 2023-06-20 PROCEDURE — 93970 EXTREMITY STUDY: CPT

## 2023-06-20 PROCEDURE — 85610 PROTHROMBIN TIME: CPT

## 2023-06-20 PROCEDURE — 36415 COLL VENOUS BLD VENIPUNCTURE: CPT

## 2023-06-20 PROCEDURE — 70498 CT ANGIOGRAPHY NECK: CPT | Mod: MA

## 2023-06-20 PROCEDURE — 85027 COMPLETE CBC AUTOMATED: CPT

## 2023-06-20 PROCEDURE — 70496 CT ANGIOGRAPHY HEAD: CPT | Mod: MA

## 2023-06-20 PROCEDURE — 76380 CAT SCAN FOLLOW-UP STUDY: CPT

## 2023-06-20 PROCEDURE — 73502 X-RAY EXAM HIP UNI 2-3 VIEWS: CPT

## 2023-06-20 PROCEDURE — 87640 STAPH A DNA AMP PROBE: CPT

## 2023-06-20 PROCEDURE — 61626 TCAT PERM OCCLS/EMBOL NONCNS: CPT

## 2023-06-20 PROCEDURE — 99285 EMERGENCY DEPT VISIT HI MDM: CPT

## 2023-06-20 PROCEDURE — 72125 CT NECK SPINE W/O DYE: CPT | Mod: MA

## 2023-06-20 PROCEDURE — 82140 ASSAY OF AMMONIA: CPT

## 2023-06-20 PROCEDURE — 86850 RBC ANTIBODY SCREEN: CPT

## 2023-06-20 RX ORDER — LEVETIRACETAM 250 MG/1
1 TABLET, FILM COATED ORAL
Qty: 30 | Refills: 0
Start: 2023-06-20 | End: 2023-07-04

## 2023-06-20 RX ORDER — ACETAMINOPHEN 500 MG
2 TABLET ORAL
Qty: 0 | Refills: 0 | DISCHARGE
Start: 2023-06-20

## 2023-06-20 RX ADMIN — POLYETHYLENE GLYCOL 3350 17 GRAM(S): 17 POWDER, FOR SOLUTION ORAL at 05:25

## 2023-06-20 RX ADMIN — FAMOTIDINE 20 MILLIGRAM(S): 10 INJECTION INTRAVENOUS at 11:23

## 2023-06-20 RX ADMIN — LEVETIRACETAM 500 MILLIGRAM(S): 250 TABLET, FILM COATED ORAL at 05:26

## 2023-06-20 NOTE — PROGRESS NOTE ADULT - THIS PATIENT HAS THE FOLLOWING CONDITION(S)/DIAGNOSES ON THIS ADMISSION:
Brain Compression / Herniation
None
Brain Compression / Herniation
Encephalopathy/Brain Compression / Herniation
None
Brain Compression / Herniation

## 2023-06-20 NOTE — PROGRESS NOTE ADULT - SUBJECTIVE AND OBJECTIVE BOX
[facility] Division of Hospital Medicine  Adriana Bravo MD  Available via MS Teams  Spectra [phone]    SUBJECTIVE / OVERNIGHT EVENTS:    ADDITIONAL REVIEW OF SYSTEMS:    MEDICATIONS  (STANDING):  bisacodyl 5 milliGRAM(s) Oral at bedtime  famotidine    Tablet 20 milliGRAM(s) Oral daily  latanoprost 0.005% Ophthalmic Solution 1 Drop(s) Both EYES at bedtime  levETIRAcetam 500 milliGRAM(s) Oral two times a day  polyethylene glycol 3350 17 Gram(s) Oral two times a day  senna 2 Tablet(s) Oral at bedtime    MEDICATIONS  (PRN):  acetaminophen     Tablet .. 650 milliGRAM(s) Oral every 6 hours PRN Temp greater or equal to 38C (100.4F), Mild Pain (1 - 3)      I&O's Summary    19 Jun 2023 07:01  -  20 Jun 2023 07:00  --------------------------------------------------------  IN: 1040 mL / OUT: 1500 mL / NET: -460 mL    20 Jun 2023 07:01  -  20 Jun 2023 11:16  --------------------------------------------------------  IN: 600 mL / OUT: 0 mL / NET: 600 mL        PHYSICAL EXAM:  Vital Signs Last 24 Hrs  T(C): 36.8 (20 Jun 2023 08:00), Max: 37 (19 Jun 2023 22:10)  T(F): 98.2 (20 Jun 2023 08:00), Max: 98.6 (19 Jun 2023 22:10)  HR: 72 (20 Jun 2023 08:00) (63 - 82)  BP: 143/79 (20 Jun 2023 08:00) (108/73 - 143/79)  BP(mean): 95 (19 Jun 2023 18:00) (85 - 95)  RR: 18 (20 Jun 2023 08:00) (15 - 21)  SpO2: 99% (20 Jun 2023 08:00) (91% - 99%)    Parameters below as of 20 Jun 2023 08:00  Patient On (Oxygen Delivery Method): room air      CONSTITUTIONAL: NAD, well-groomed  NECK: Supple  RESPIRATORY: Normal respiratory effort; lungs are clear to auscultation bilaterally  CARDIOVASCULAR: normal S1 and S2, no murmur/rub/gallop; No lower extremity edema  ABDOMEN: Nontender to palpation, normoactive bowel sounds, no rebound/guarding  MUSCULOSKELETAL:  no clubbing or cyanosis of digits; no joint swelling or tenderness to palpation  PSYCH: A+O to person, place, and time; affect appropriate  NEUROLOGY: CN 2-12 are intact and symmetric  SKIN: No rashes    LABS:                        14.7   8.68  )-----------( 201      ( 20 Jun 2023 07:17 )             44.7     06-20    137  |  97  |  22  ----------------------------<  108<H>  5.1   |  25  |  1.05    Ca    9.4      20 Jun 2023 07:14  Phos  3.2     06-20  Mg     2.4     06-20      PT/INR - ( 19 Jun 2023 05:42 )   PT: 11.5 sec;   INR: 1.00 ratio         PTT - ( 19 Jun 2023 05:42 )  PTT:27.5 sec                RADIOLOGY & ADDITIONAL TESTS:  New Results Reviewed Today:   New Imaging Personally Reviewed Today:  New Electrocardiogram Personally Reviewed Today:  Prior or Outpatient Records Reviewed Today:    COMMUNICATION:  Care Discussed with Consultants/Other Providers and Details of Discussion:  Discussions with Patient/Family:  PCP Communication:     [facility] Division of Hospital Medicine  Adriana Bravo MD  Available via MS Teams  Spectra [phone]    SUBJECTIVE / OVERNIGHT EVENTS: Patient seen and examined this morning. Granddaughter at bedside assisted with translation. Patient denied any pain, SOB. He lives with his son. Last drink was more than 1 week prior to admission.     ADDITIONAL REVIEW OF SYSTEMS:    MEDICATIONS  (STANDING):  bisacodyl 5 milliGRAM(s) Oral at bedtime  famotidine    Tablet 20 milliGRAM(s) Oral daily  latanoprost 0.005% Ophthalmic Solution 1 Drop(s) Both EYES at bedtime  levETIRAcetam 500 milliGRAM(s) Oral two times a day  polyethylene glycol 3350 17 Gram(s) Oral two times a day  senna 2 Tablet(s) Oral at bedtime    MEDICATIONS  (PRN):  acetaminophen     Tablet .. 650 milliGRAM(s) Oral every 6 hours PRN Temp greater or equal to 38C (100.4F), Mild Pain (1 - 3)      I&O's Summary    19 Jun 2023 07:01  -  20 Jun 2023 07:00  --------------------------------------------------------  IN: 1040 mL / OUT: 1500 mL / NET: -460 mL    20 Jun 2023 07:01  -  20 Jun 2023 11:16  --------------------------------------------------------  IN: 600 mL / OUT: 0 mL / NET: 600 mL        PHYSICAL EXAM:  Vital Signs Last 24 Hrs  T(C): 36.8 (20 Jun 2023 08:00), Max: 37 (19 Jun 2023 22:10)  T(F): 98.2 (20 Jun 2023 08:00), Max: 98.6 (19 Jun 2023 22:10)  HR: 72 (20 Jun 2023 08:00) (63 - 82)  BP: 143/79 (20 Jun 2023 08:00) (108/73 - 143/79)  BP(mean): 95 (19 Jun 2023 18:00) (85 - 95)  RR: 18 (20 Jun 2023 08:00) (15 - 21)  SpO2: 99% (20 Jun 2023 08:00) (91% - 99%)    Parameters below as of 20 Jun 2023 08:00  Patient On (Oxygen Delivery Method): room air      CONSTITUTIONAL: NAD, well-groomed  RESPIRATORY: Normal respiratory effort; lungs are clear to auscultation bilaterally  CARDIOVASCULAR: normal S1 and S2, no murmur/rub/gallop; No lower extremity edema  ABDOMEN: Nontender to palpation, normoactive bowel sounds, no rebound/guarding  MUSCULOSKELETAL:  no clubbing or cyanosis of digits; no joint swelling or tenderness to palpation  PSYCH: A+O to person, place, and time; affect appropriate  NEUROLOGY: follows commands, moves all extremities      LABS:                        14.7   8.68  )-----------( 201      ( 20 Jun 2023 07:17 )             44.7     06-20    137  |  97  |  22  ----------------------------<  108<H>  5.1   |  25  |  1.05    Ca    9.4      20 Jun 2023 07:14  Phos  3.2     06-20  Mg     2.4     06-20      PT/INR - ( 19 Jun 2023 05:42 )   PT: 11.5 sec;   INR: 1.00 ratio         PTT - ( 19 Jun 2023 05:42 )  PTT:27.5 sec                RADIOLOGY & ADDITIONAL TESTS:  New Results Reviewed Today:     < from: CT Head No Cont (06.19.23 @ 08:49) >  IMPRESSION:  Bilateral mixed attenuation subdural hematomas are evolving status post   bilateral middle meningeal artery embolization and right subdural drain   removal..    < end of copied text >        COMMUNICATION:  Care Discussed with Consultants/Other Providers and Details of Discussion: neurosurgery PA(Araceli)

## 2023-06-20 NOTE — DISCHARGE NOTE PROVIDER - NSDCFUSCHEDAPPT_GEN_ALL_CORE_FT
[facility] Physician [facility]  OP[address]THALM [address]  Scheduled Appointment: 08/16/2023    Ayaka Pham  [facility] Physician [facility]  OP[address]THALM [address]  Scheduled Appointment: 08/16/2023     Gucci Moore  [facility]  NEUROSURG [address] [place] [address]  Scheduled Appointment: 07/05/2023    [facility]  OP[address]THALM [address]  Scheduled Appointment: 08/16/2023    Ayaka Pham  [facility]  OPHTHALM [address]  Scheduled Appointment: 08/16/2023

## 2023-06-20 NOTE — DISCHARGE NOTE PROVIDER - NSDCCPCAREPLAN_GEN_ALL_CORE_FT
PRINCIPAL DISCHARGE DIAGNOSIS  Diagnosis: Traumatic subdural hematoma without loss of consciousness  Assessment and Plan of Treatment: s/p Right Subdural  drain placement in IR 6/16/23 s/p Cerebral Angiogram and embolization of right MMA with coils and micro-particles and Left MMA with [place] 6/16/23 .  Keep Incision Clean and Dry. May shower.  pat dry after. no creams or lotions on incision. No immersion baths..  No strenous activity. No heavy lifting. Do not return to work until cleared by physician. No driving until cleared by physician.  Fall precautions   Follow up with Dr Moore in 10 days         SECONDARY DISCHARGE DIAGNOSES  Diagnosis: ETOHism  Assessment and Plan of Treatment: Avoid alcohol intake. Alcohol intake incrases risk for falls & seizures    Diagnosis: Glaucoma  Assessment and Plan of Treatment: Continue eye drops. Follow up with opthalmologist

## 2023-06-20 NOTE — DISCHARGE NOTE PROVIDER - NSDCFUADDAPPT_GEN_ALL_CORE_FT
Call Dr Daily office to confirm follow up appointment in 10-14 days  Follow up with Dr Daily office 7/5/23 ay 10;15 am

## 2023-06-20 NOTE — PROGRESS NOTE ADULT - REASON FOR ADMISSION
AMS, s/p fall about 3 weeks ago

## 2023-06-20 NOTE — DISCHARGE NOTE PROVIDER - NSDCMRMEDTOKEN_GEN_ALL_CORE_FT
acetaminophen 325 mg oral tablet: 2 tab(s) orally every 6 hours as needed for  Mild Pain (1 - 3)  bisacodyl 5 mg oral delayed release tablet: 1 tab(s) orally once a day (at bedtime) as needed for  constipation  latanoprost 0.005% ophthalmic emulsion: 1 in each affected eye once a day  levETIRAcetam 500 mg oral tablet: 1 tab(s) orally 2 times a day  Pepcid 20 mg oral tablet: 1 tab(s) orally once a day

## 2023-06-20 NOTE — DISCHARGE NOTE PROVIDER - HOSPITAL COURSE
82 y/o male Belarusian speaking, PMHx glaucoma, ETOH. fatty liver  fall 3 months ago, presents to ED today with his grand daughter for another fall 2 days ago, with subsequent subtle AMS, left side weakness and pt just not himself and less conversant.  Pt took tylenol only for HA and is not on ASA or blood thinners. CT head Mixed density subdural hematoma posteriorly in the right frontal parietal region measuring 2.6 cm with mass effect  & small left frontal SDH . s/p Right Subdural  drain placement in IR 6/16/23 s/p Cerebral Angiogram and embolization of right MMA with coils and micro-particles and Left MMA with Artemio 6/16/23 . s/p subdural drain removal 6/19/23. Post drain removal CT head on 6/19  Bilateral mixed attenuation subdural hematomas are evolving, considerable decreased Right fronto parietal hematoma. Evaluated by PT& OT & recommended for outpatient PT. Discharged home in stable condition.

## 2023-06-20 NOTE — DISCHARGE NOTE PROVIDER - NSDCFUADDINST_GEN_ALL_CORE_FT
Return to ER if develops fevers, bleeding , wound drainage, uncontrolled pain, weakness of extremities, lethargy or sluggishness..  Do not take Aspirin Ibuprofen  (Motrin)  , Naproxen ( aleve)  or Meloxicam  for pain>

## 2023-06-20 NOTE — PROGRESS NOTE ADULT - ASSESSMENT
84 y/o male Irish speaking, PMHx glaucoma, hepatic steatosis, chronic alcohol use, presented after a fall c/b left sided weakness and confusion, found to have 2.3cm R frontal/parietal SDH, and small left 0.9cm SDH, with mass effect and mild uncal herniation. Patient is s/p right bishop hole with subdural drain placement, cerebral angiogram and embolization of right MMA with coils and micro-particles and left MMA with [place] 6/16/23.

## 2023-06-20 NOTE — PROGRESS NOTE ADULT - PROBLEM SELECTOR PLAN 1
postop management per neurosurgery  on keppra for seizure prophylaxis SDH with brain compression with course as above  postop management per neurosurgery  on keppra for seizure prophylaxis

## 2023-06-20 NOTE — DISCHARGE NOTE PROVIDER - CARE PROVIDER_API CALL
Gucci Moore  Neurosurgery  [address], [address]  [place] [address]  Phone: [phone]  Fax: [phone]  Follow Up Time:

## 2023-06-20 NOTE — PROGRESS NOTE ADULT - PROBLEM SELECTOR PLAN 2
patient independent and ambulating  continue with bowel regimen. last documented BM 6/19  advised patient to abstain from drinking    D/c planning likely home later today.

## 2023-06-20 NOTE — PROGRESS NOTE ADULT - ASSESSMENT
82 y/o male Persian speaking, PMHx glaucoma, ETOH. fatty liver  fall 3 months ago, presents to ED today with his grand daughter for another fall 2 days ago, with subsequent subtle AMS, left side weakness and pt just not himself and less conversant.  Pt took tylenol only for HA and is not on ASA or blood thinners. CT head Mixed density subdural hematoma posteriorly in the right frontal parietal region measuring 2.6 cm with mass effect  & small left frontal SDH . s/p Right bishop hole Subdural  drain placement in IR 6/16/23 s/p Cerebral Angiogram and embolization of right MMA with coils and micro-particles and Left MMA with [place]em[place] 6/16/23 . s/p subdural drain removal 6/19/23. Post drain removal CT head acceptable     Plan    Neuro stable. Keppra for seizure ppx  Vitals stable   labs acceptable  PT- Outpatient  D/c home today   D/w Hospitalist &  Dr Moore      82 y/o male Amharic speaking, PMHx glaucoma, ETOH. fatty liver  fall 3 months ago, presents to ED today with his grand daughter for another fall 2 days ago, with subsequent subtle AMS, left side weakness and pt just not himself and less conversant.  Pt took tylenol only for HA and is not on ASA or blood thinners. CT head Mixed density subdural hematoma posteriorly in the right frontal parietal region measuring 2.6 cm with mass effect  & small left frontal SDH . s/p Right bishop hole Subdural  drain placement in IR 6/16/23 s/p Cerebral Angiogram and embolization of right MMA with coils and micro-particles and Left MMA with Artemio 6/16/23 . s/p subdural drain removal 6/19/23. Post drain removal CT head acceptable     Plan    Neuro stable. Keppra for seizure ppx  Vitals stable   labs acceptable  PT- Outpatient  D/c home today . All discharge instructions & plans conveyed via  services.   D/w Hospitalist &  Dr Moore

## 2023-06-20 NOTE — PROGRESS NOTE ADULT - SUBJECTIVE AND OBJECTIVE BOX
SUBJECTIVE:   Italian speaking. Granddaughter at bedside. No complaints Doing well.   OVERNIGHT EVENTS: none    Vital Signs Last 24 Hrs  T(C): 36.8 (2023 08:00), Max: 37 (2023 22:10)  T(F): 98.2 (2023 08:00), Max: 98.6 (2023 22:10)  HR: 72 (2023 08:00) (63 - 82)  BP: 143/79 (2023 08:00) (108/73 - 143/79)  BP(mean): 95 (2023 18:00) (85 - 95)  RR: 18 (2023 08:00) (15 - 21)  SpO2: 99% (2023 08:00) (91% - 99%)    Parameters below as of 2023 08:00  Patient On (Oxygen Delivery Method): room air        PHYSICAL EXAM:    Constitutional: No Acute Distress     Neurological: Awake alert Ox3, Speech clear  Following Commands, Moving all Extremities 5/5 No drift . Right cranial staples C/D/I      Pulmonary: Clear to Auscultation,     Cardiovascular: S1, S2, Regular rate and rhythm     Gastrointestinal: Soft, Non-tender, Non-distended     Extremities: No calf tenderness . R groin soft/ min ecchymosis         LABS:                        14.7   8.68  )-----------( 201      ( 2023 07:17 )             44.7    06-20    137  |  97  |  22  ----------------------------<  108<H>  5.1   |  25  |  1.05    Ca    9.4      2023 07:14  Phos  3.2     06-20  Mg     2.4     06-20    PT/INR - ( 2023 05:42 )   PT: 11.5 sec;   INR: 1.00 ratio    PTT:27.5 sec        IMAGIN/19- CT head- Bilateral mixed attenuation subdural hematomas are evolving status post bilateral middle meningeal artery embolization and right subdural drain removal..    MEDICATIONS:    acetaminophen     Tablet .. 650 milliGRAM(s) Oral every 6 hours PRN Temp greater or equal to 38C (100.4F), Mild Pain (1 - 3)  levETIRAcetam 500 milliGRAM(s) Oral two times a day  bisacodyl 5 milliGRAM(s) Oral at bedtime  famotidine    Tablet 20 milliGRAM(s) Oral daily  polyethylene glycol 3350 17 Gram(s) Oral two times a day  senna 2 Tablet(s) Oral at bedtime  latanoprost 0.005% Ophthalmic Solution 1 Drop(s) Both EYES at bedtime      DIET:

## 2023-07-05 ENCOUNTER — APPOINTMENT (OUTPATIENT)
Dept: NEUROSURGERY | Facility: CLINIC | Age: 83
End: 2023-07-05
Payer: MEDICARE

## 2023-07-05 ENCOUNTER — NON-APPOINTMENT (OUTPATIENT)
Age: 83
End: 2023-07-05

## 2023-07-05 VITALS
DIASTOLIC BLOOD PRESSURE: 75 MMHG | TEMPERATURE: 97.6 F | BODY MASS INDEX: 33.23 KG/M2 | HEART RATE: 52 BPM | SYSTOLIC BLOOD PRESSURE: 134 MMHG | WEIGHT: 176 LBS | OXYGEN SATURATION: 95 % | HEIGHT: 61 IN

## 2023-07-05 PROCEDURE — 99024 POSTOP FOLLOW-UP VISIT: CPT

## 2023-07-05 NOTE — REASON FOR VISIT
[Family Member] : family member [de-identified] : Right bur hole for evacuation of chronic subdural hematoma [de-identified] : 6/16/23 [de-identified] : \par s/p Bilateral MMA embolization 6/16/23

## 2023-07-05 NOTE — HISTORY OF PRESENT ILLNESS
[FreeTextEntry1] : EMMA NAPIER is a 83 year old male with PMH of ETOH use, glaucoma, no previous AC/AP use who sustained a fall with a headstrike 3-4 weeks prior and presented on 6/16/23 with altered mental status and progressive left sided weakness. CTH demonstrated a relatively homogeneous appearing right convexity subdural hematoma measuring 2.3cm causing brain compression and midline shift. On 6/16/23, he underwent right bishop hole for evacuation of subdural hematoma, and successful bilateral MMA embolization, Discharged home on Keppra 500mg BID for seizure prophylaxis. \par \par Today, patient presents for his first post-op visit with his daughter, Beth doing well. Denies neurologic symptoms of motor, sensory, speech, or visual abnormalities and issues with incision site. States he has swelling in both legs since hospitalization.

## 2023-07-05 NOTE — PHYSICAL EXAM
[Clean] : clean [Dry] : dry [Healing Well] : healing well [Staple Intact] : closed with intact staples [No Drainage] : without drainage [Normal Skin] : normal [Person] : oriented to person [Place] : oriented to place [Time] : oriented to time [Motor Tone] : muscle tone was normal in all four extremities [Motor Strength] : muscle strength was normal in all four extremities [Abnormal Walk] : normal gait [Balance] : balance was intact [Erythema] : not erythematous [Tender] : not tender [Warm] : not warm [FreeTextEntry1] : Right bishop hole

## 2023-07-05 NOTE — REVIEW OF SYSTEMS
[As Noted in HPI] : as noted in HPI [Negative] : Heme/Lymph [de-identified] : bilateral lower leg swelling

## 2023-07-05 NOTE — ASSESSMENT
[FreeTextEntry1] : IMPRESSION:\par 83M with PMH of ETOH use, glaucoma, no previous AC/AP, s/p fall with headstrike, p/w AMS, L hemiparesis, found to have 2.3cm R SDH with mass effect, MLS, s/p R bishop hole for evac of SDH, b/l MMA embo, Embospheres and coils on the R, and russell liquid embolic on the L, on 6/16/23. Discharged on Keppra 500mg BID for seizure ppx. \par \par Patient doing clinically well, neurologically intact. No symptoms of motor, sensory, speech, or visual abnormalities. \par R bishop hole incision clean, dry, healing well and without drainage. The surrounding skin was normal, not erythematous, nontender, not warm and not indurated. No signs and symptoms of infection. Staples intact, removed staples in office with no issues. Advised to continue washing his hair with moisturizing gentle shampoo to prevent infection and to avoid picking at scabs and the incision. Assured it is normal to feel itchiness from scab formation. \par \par Endorses swelling in both legs with some cramping. No tenderness to palpation over calves. \par \par \par \par PLAN:\par Bilateral lower extremity venous doppler ultrasound to rule out DVT and further evaluation of bilateral leg swelling\par Repeat CT head non con - scheduled 7/7/23\par Follow up phone call next week to review results of above\par Advised to follow up with PCP\par

## 2023-07-07 ENCOUNTER — OUTPATIENT (OUTPATIENT)
Dept: OUTPATIENT SERVICES | Facility: HOSPITAL | Age: 83
LOS: 1 days | End: 2023-07-07
Payer: MEDICARE

## 2023-07-07 ENCOUNTER — APPOINTMENT (OUTPATIENT)
Dept: ULTRASOUND IMAGING | Facility: IMAGING CENTER | Age: 83
End: 2023-07-07
Payer: MEDICARE

## 2023-07-07 ENCOUNTER — APPOINTMENT (OUTPATIENT)
Dept: CT IMAGING | Facility: IMAGING CENTER | Age: 83
End: 2023-07-07
Payer: MEDICARE

## 2023-07-07 DIAGNOSIS — S06.5XAA TRAUMATIC SUBDURAL HEMORRHAGE WITH LOSS OF CONSCIOUSNESS STATUS UNKNOWN, INITIAL ENCOUNTER: ICD-10-CM

## 2023-07-07 PROCEDURE — 93970 EXTREMITY STUDY: CPT | Mod: 26

## 2023-07-07 PROCEDURE — 70450 CT HEAD/BRAIN W/O DYE: CPT

## 2023-07-07 PROCEDURE — 70450 CT HEAD/BRAIN W/O DYE: CPT | Mod: 26

## 2023-07-07 PROCEDURE — 93970 EXTREMITY STUDY: CPT

## 2023-07-12 ENCOUNTER — APPOINTMENT (OUTPATIENT)
Dept: NEUROSURGERY | Facility: CLINIC | Age: 83
End: 2023-07-12
Payer: MEDICARE

## 2023-07-12 PROCEDURE — 99024 POSTOP FOLLOW-UP VISIT: CPT

## 2023-07-13 NOTE — ASSESSMENT
[FreeTextEntry1] : IMPRESSION:\par 83M with PMH of ETOH use, glaucoma, no previous AC/AP, s/p fall with headstrike, p/w AMS, L hemiparesis, found to have 2.3cm R SDH with mass effect, MLS, s/p R bishop hole for evac of SDH, b/l MMA embo, Embospheres and coils on the R, and russell liquid embolic on the L, on 6/16/23. Discharged on Keppra 500mg BID for seizure ppx. \par \par Patient doing clinically well, neurologically intact. No symptoms of motor, sensory, speech, or visual abnormalities. No issues with surgical incision.  Advised to continue washing his hair with moisturizing gentle shampoo to prevent infection and to avoid picking at scabs and the incision. \par \par B/l LE venous doppler US 7/7/23 shows no evidence of DVT in bilateral lower extremities. There is a 3.6 x 1.1 x 2.4cm structure in left groin, isoechoic to the subcutaneous fat, likely representing a lipoma.\par CT head non con 7/7/23 shows continued evolution of b/l SDH, with residual measuring 10mm on the R, 7mm on the L, with slight interval decreased mass effect and persistent leftward midline shift measuring 4-5mm. No evidence of acute hemorrhage. \par \par \par \par PLAN:\par Repeat CT head non con in another 2-3 weeks (6 weeks post-op)\par Follow up phone call to be scheduled 8/2/23 to review results\par Continue follow up with PCP for evaluation of bilateral lower extremity swelling L>R\par

## 2023-07-13 NOTE — HISTORY OF PRESENT ILLNESS
[FreeTextEntry1] : EMMA NAPIER is a 83 year old male with PMH of ETOH use, glaucoma, no previous AC/AP use who sustained a fall with a headstrike 3-4 weeks prior and presented on 6/16/23 with altered mental status and progressive left sided weakness. CTH demonstrated a relatively homogeneous appearing right convexity subdural hematoma measuring 2.3cm causing brain compression and midline shift. On 6/16/23, he underwent right bishop hole for evacuation of subdural hematoma, and successful bilateral MMA embolization, Discharged home on Keppra 500mg BID for seizure prophylaxis. \par \par Today, patient and daughter present for a follow up phone call to review results of LE venous ultrasound and repeat CT head. Per daughter, patient continues to do well with no complaints. Denies issues with surgical incision.

## 2023-07-13 NOTE — REASON FOR VISIT
[Home] : at home, [unfilled] , at the time of the visit. [Medical Office: ([facility])___] : at the medical office located in  [Family Member] : family member [Verbal consent obtained from patient] : the patient, [unfilled] [de-identified] : Right bishop hole for evacuation of chronic subdural hematoma [de-identified] : 6/16/23 [de-identified] : \par s/p Bilateral MMA embolization 6/16/23\par \par Reviewed results of Bilateral lower extremity venous doppler ultrasound and repeat CT head non con done 7/7/23

## 2023-07-28 ENCOUNTER — APPOINTMENT (OUTPATIENT)
Dept: INTERNAL MEDICINE | Facility: CLINIC | Age: 83
End: 2023-07-28
Payer: MEDICARE

## 2023-07-28 ENCOUNTER — OUTPATIENT (OUTPATIENT)
Dept: OUTPATIENT SERVICES | Facility: HOSPITAL | Age: 83
LOS: 1 days | End: 2023-07-28
Payer: MEDICARE

## 2023-07-28 VITALS
HEIGHT: 61 IN | OXYGEN SATURATION: 96 % | DIASTOLIC BLOOD PRESSURE: 70 MMHG | HEART RATE: 70 BPM | BODY MASS INDEX: 35.87 KG/M2 | SYSTOLIC BLOOD PRESSURE: 116 MMHG | WEIGHT: 190 LBS

## 2023-07-28 DIAGNOSIS — I10 ESSENTIAL (PRIMARY) HYPERTENSION: ICD-10-CM

## 2023-07-28 DIAGNOSIS — R51.9 HEADACHE, UNSPECIFIED: ICD-10-CM

## 2023-07-28 PROCEDURE — G0463: CPT | Mod: 25

## 2023-07-28 PROCEDURE — 99213 OFFICE O/P EST LOW 20 MIN: CPT | Mod: GE

## 2023-07-28 PROCEDURE — G0009: CPT

## 2023-07-28 PROCEDURE — 90732 PPSV23 VACC 2 YRS+ SUBQ/IM: CPT

## 2023-07-28 NOTE — HISTORY OF PRESENT ILLNESS
[Family Member] : family member [FreeTextEntry1] : Patient is presenting for hospital follow up.  [de-identified] : 83 yo M Cuban-speaking w/ hx of obesity, hepatic steatosis, and sebaceous back cyst (resolved s/p abx) presenting for hospital follow up (s/p fall subdural hematoma, drain and MMA embolization by neurosurgery) \par Interpretation- accompanied by granddaughter. \par \par Overall history - feels better, improved to baseline strength, complaining of R headaches and b/l knee and feet discomfort \par Medication changes - Finished keppra 2 week course for ppx \par tylenol for headache, conseulled on avoiding NSAIDS in the setting of subdural hematoma\par \par Hospital Summary \par 82 y/o male Romanian speaking, PMHx glaucoma, ETOH. fatty liver fall 3 months ago, presents to ED today with his grand daughter for another fall 2 days ago, with subsequent subtle AMS, left side weakness and pt just not himself and less conversant. Not on blood thinners.\par - CT head Mixed density subdural hematoma posteriorly in the right frontal\par - parietal region measuring 2.6 cm with mass effect  & small left frontal SDH .\par - s/p Right Subdural  drain placement in IR 6/16/23 s/p Cerebral Angiogram and embolization of right MMA with coils and micro-particles and Left MMA with Artem[place]\par 6/16/23 . s/p subdural drain removal 6/19/23. Post drain removal CT head on\par 6/19  Bilateral mixed attenuation subdural hematomas are evolving, considerable decreased Right fronto parietal hematoma. Evaluated by PT& OT & recommended for outpatient PT. Discharged home in stable condition.\par \par - CBC reviewed (normal) \par - CMP reviewed \par - Urine reviewed \par - Coagulation Reviewed \par \par Imaging Reviews  - DVT negative \par CT- 6/19/2023 -\par Bilateral mixed attenuation subdural hematomas are evolving status post bilateral middle meningeal artery embolization and right subdural drain removal..\par CT - 7/7/02923 Continued evolution of the bilateral hemispheric subdural hemorrhages with slight interval decreased mass effect and persistent leftward midline shift, compared with prior CT dated 6/19/2023 and 6/17/2023.\par \par #headache \par - feels like want to syncope only when getting up quickly. \par - Headache, starts right slowly moves to the left, 5 minutes, 6/10 severity, started after surgery, usually associated with getting up quickly, not worse when supinne\par - Associated symptoms - no weakness, numbness, or blurry vision, finds it difficult to walk after the headache\par - No vertigo \par - One episode on a hot day, on a bench, got up quickly, felt like he was going to fall forwad, but did not fall \par - ambulates alone, has a cane at home, full ADLS, assessed on this encounter \par \par #knee numbness after surgery b/l\par - everyday since after the surgery\par - feels like something is "hard in the bottom of the foot"\par - feels weaker than normal\par -  no radiation from above or below \par \par #cough \par - history of years, concerned for violent cough affecting the brain\par \par lives with his nephew, carpeted floors at home. 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

## 2023-07-28 NOTE — REVIEW OF SYSTEMS
[Headache] : headache [Dizziness] : dizziness [Negative] : Heme/Lymph [Confusion] : no confusion [Unsteady Walk] : no ataxia [Memory Loss] : no memory loss

## 2023-07-28 NOTE — PHYSICAL EXAM
[No Acute Distress] : no acute distress [Well Nourished] : well nourished [Well Developed] : well developed [Well-Appearing] : well-appearing [Normal Sclera/Conjunctiva] : normal sclera/conjunctiva [PERRL] : pupils equal round and reactive to light [EOMI] : extraocular movements intact [No Respiratory Distress] : no respiratory distress  [No Accessory Muscle Use] : no accessory muscle use [Clear to Auscultation] : lungs were clear to auscultation bilaterally [Normal Rate] : normal rate  [Regular Rhythm] : with a regular rhythm [Normal S1, S2] : normal S1 and S2 [No Murmur] : no murmur heard [No Carotid Bruits] : no carotid bruits [No Abdominal Bruit] : a ~M bruit was not heard ~T in the abdomen [No Varicosities] : no varicosities [Pedal Pulses Present] : the pedal pulses are present [No Edema] : there was no peripheral edema [No Extremity Clubbing/Cyanosis] : no extremity clubbing/cyanosis [Soft] : abdomen soft [Non Tender] : non-tender [Non-distended] : non-distended [No Masses] : no abdominal mass palpated [No HSM] : no HSM [Normal Bowel Sounds] : normal bowel sounds [Normal Posterior Cervical Nodes] : no posterior cervical lymphadenopathy [Normal Anterior Cervical Nodes] : no anterior cervical lymphadenopathy [No CVA Tenderness] : no CVA  tenderness [No Spinal Tenderness] : no spinal tenderness [No Joint Swelling] : no joint swelling [Grossly Normal Strength/Tone] : grossly normal strength/tone [No Rash] : no rash [Coordination Grossly Intact] : coordination grossly intact [No Focal Deficits] : no focal deficits [Normal Gait] : normal gait [Deep Tendon Reflexes (DTR)] : deep tendon reflexes were 2+ and symmetric [Normal Affect] : the affect was normal [Normal Insight/Judgement] : insight and judgment were intact [de-identified] : Full viusal fields  [de-identified] : Non focal CN, UE / LE 5/5 strength, 2+ UE/LE reflexes, 2+ Right and 1+ Left patellar reflex, Normal Gait and full gait on Plantar / dosriflexion. Mild tenderness to Right temporal area (surgery site)

## 2023-07-28 NOTE — PLAN
[FreeTextEntry1] : 81 yo M Luxembourgish-speaking w/ hx of obesity, hepatic steatosis, and sebaceous back cyst (resolved s/p abx) presenting for hospital follow up. \par \par Will follow up for CPE in 5 weeks \par \par Hospital Summary \par 82 y/o male Luxembourgish speaking, PMHx glaucoma, ETOH. fatty liver fall 3 months ago, presents to ED today with his grand daughter for another fall 2 days ago, with subsequent subtle AMS, left side weakness and pt just not himself and less conversant. Not on blood thinners.\par - CT head Mixed density subdural hematoma posteriorly in the right frontal\par - parietal region measuring 2.6 cm with mass effect  & small left frontal SDH .\par - s/p Right Subdural  drain placement in IR 6/16/23 s/p Cerebral Angiogram and embolization of right MMA with coils and micro-particles and Left MMA with [place]\par 6/16/23 . s/p subdural drain removal 6/19/23. Post drain removal CT head on\par 6/19  Bilateral mixed attenuation subdural hematomas are evolving, considerable decreased Right fronto parietal hematoma. Evaluated by PT& OT & recommended for outpatient PT. Discharged home in stable condition.\par 7/7/02923 Continued evolution of the bilateral hemispheric subdural hemorrhages with slight interval decreased mass effect and persistent leftward midline shift, compared with prior CT dated 6/19/2023 and 6/17/2023.\par \par #Headache - Unilateral / syncope symptoms / 6/10 R--> migrating to Left \par - Tension vs orhtrostatic hypotension\par - tylenol prn, no NSAID \par - f/u CT 7/7 \par \par #fall risk\par - Full ADLs - assessed and consulled \par - patient counselled on safe alcchol use with supervision, recommended slow return to physical acitivty and work \par \par #knee pain / discomfort \par - b/l without weakness, normal gait \par - unlikely intracranial given bilateral presentation - neurovascularly intact \par - tylenol PRN\par \par #HCM\par Pneumococcal - PCV 20 7/28/2023 Today \par Shingles - Reommneded to get at pharmacy in 1 week \par Fall risk -Full functional capacity, however still fall risk given these presyncopal episodes - ASSESSED, patient has no loose carpets, recommended bathroom installments, Counseled on safe alchol use and supervision when drinking. \par - Stars referral offered - patient is not interested in PT at this time

## 2023-07-31 ENCOUNTER — APPOINTMENT (OUTPATIENT)
Dept: CT IMAGING | Facility: IMAGING CENTER | Age: 83
End: 2023-07-31
Payer: MEDICARE

## 2023-07-31 ENCOUNTER — OUTPATIENT (OUTPATIENT)
Dept: OUTPATIENT SERVICES | Facility: HOSPITAL | Age: 83
LOS: 1 days | End: 2023-07-31
Payer: MEDICARE

## 2023-07-31 DIAGNOSIS — S06.5XAA TRAUMATIC SUBDURAL HEMORRHAGE WITH LOSS OF CONSCIOUSNESS STATUS UNKNOWN, INITIAL ENCOUNTER: ICD-10-CM

## 2023-07-31 DIAGNOSIS — Z23 ENCOUNTER FOR IMMUNIZATION: ICD-10-CM

## 2023-07-31 DIAGNOSIS — Z00.00 ENCOUNTER FOR GENERAL ADULT MEDICAL EXAMINATION WITHOUT ABNORMAL FINDINGS: ICD-10-CM

## 2023-07-31 DIAGNOSIS — R51.9 HEADACHE, UNSPECIFIED: ICD-10-CM

## 2023-07-31 PROCEDURE — 70450 CT HEAD/BRAIN W/O DYE: CPT | Mod: 26

## 2023-07-31 PROCEDURE — 70450 CT HEAD/BRAIN W/O DYE: CPT

## 2023-08-02 ENCOUNTER — APPOINTMENT (OUTPATIENT)
Dept: NEUROSURGERY | Facility: CLINIC | Age: 83
End: 2023-08-02
Payer: MEDICARE

## 2023-08-02 PROCEDURE — 99442: CPT

## 2023-08-04 NOTE — ASSESSMENT
[FreeTextEntry1] : IMPRESSION: 83M with PMH of ETOH use, glaucoma, no previous AC/AP, s/p fall with headstrike, p/w AMS, L hemiparesis, found to have 2.3cm R SDH with mass effect, MLS, s/p R bishop hole for evac of SDH, b/l MMA embo, Embospheres and coils on the R, and russell liquid embolic on the L, on 6/16/23. Discharged on Keppra 500mg BID for seizure ppx.  Per daughter, patient has mild occasional headaches but not bothersome. Also reports occasional dizziness when bending down. Denies other symptoms of motor, sensory, speech, or visual abnormalities.  No issues with surgical incision. Advised to continue washing his hair with moisturizing gentle shampoo to prevent infection and to avoid picking at scabs and the incision. 6 week post-op CT head 7/31/23 shows improvement in bilateral SDH and decrease in size with 7mm on the R, previously 12mm, and 5mm on the left, previously 7mm. No evidence of acute hemorrhage or midline shift based on my own read.    PLAN: Repeat CT head non con in 6 weeks (3 months post-op) - mid-September 2023 Follow up phone call after CT to review results

## 2023-08-04 NOTE — REASON FOR VISIT
[Home] : at home, [unfilled] , at the time of the visit. 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

## 2023-08-04 NOTE — HISTORY OF PRESENT ILLNESS
[FreeTextEntry1] : EMMA NAPIER is a 83 year old male with PMH of ETOH use, glaucoma, no previous AC/AP use who sustained a fall with a headstrike 3-4 weeks prior and presented on 6/16/23 with altered mental status and progressive left sided weakness. CTH demonstrated a relatively homogeneous appearing right convexity subdural hematoma measuring 2.3cm causing brain compression and midline shift. On 6/16/23, he underwent right bishop hole for evacuation of subdural hematoma, and successful bilateral MMA embolization, Discharged home on Keppra 500mg BID for seizure prophylaxis.  Today, patient and daughter present for a follow up phone call to review results of CT head done 7/31/23. Per daughter, patient continues to do well with no complaints. Denies issues with surgical incision.

## 2023-08-16 ENCOUNTER — NON-APPOINTMENT (OUTPATIENT)
Age: 83
End: 2023-08-16

## 2023-08-16 ENCOUNTER — APPOINTMENT (OUTPATIENT)
Dept: OPHTHALMOLOGY | Facility: CLINIC | Age: 83
End: 2023-08-16
Payer: MEDICARE

## 2023-08-16 PROCEDURE — 92133 CPTRZD OPH DX IMG PST SGM ON: CPT

## 2023-08-16 PROCEDURE — 92012 INTRM OPH EXAM EST PATIENT: CPT | Mod: 25

## 2023-08-16 PROCEDURE — 92083 EXTENDED VISUAL FIELD XM: CPT

## 2023-09-01 ENCOUNTER — OUTPATIENT (OUTPATIENT)
Dept: OUTPATIENT SERVICES | Facility: HOSPITAL | Age: 83
LOS: 1 days | End: 2023-09-01
Payer: MEDICARE

## 2023-09-01 ENCOUNTER — APPOINTMENT (OUTPATIENT)
Dept: INTERNAL MEDICINE | Facility: CLINIC | Age: 83
End: 2023-09-01
Payer: MEDICARE

## 2023-09-01 VITALS
SYSTOLIC BLOOD PRESSURE: 130 MMHG | BODY MASS INDEX: 35.68 KG/M2 | WEIGHT: 189 LBS | HEART RATE: 54 BPM | DIASTOLIC BLOOD PRESSURE: 70 MMHG | HEIGHT: 61 IN | OXYGEN SATURATION: 98 %

## 2023-09-01 DIAGNOSIS — M54.50 LOW BACK PAIN, UNSPECIFIED: ICD-10-CM

## 2023-09-01 DIAGNOSIS — I10 ESSENTIAL (PRIMARY) HYPERTENSION: ICD-10-CM

## 2023-09-01 DIAGNOSIS — Z00.00 ENCOUNTER FOR GENERAL ADULT MEDICAL EXAMINATION W/OUT ABNORMAL FINDINGS: ICD-10-CM

## 2023-09-01 DIAGNOSIS — I83.93 ASYMPTOMATIC VARICOSE VEINS OF BILATERAL LOWER EXTREMITIES: ICD-10-CM

## 2023-09-01 DIAGNOSIS — K76.0 FATTY (CHANGE OF) LIVER, NOT ELSEWHERE CLASSIFIED: ICD-10-CM

## 2023-09-01 DIAGNOSIS — E65 LOCALIZED ADIPOSITY: ICD-10-CM

## 2023-09-01 LAB
ALBUMIN SERPL ELPH-MCNC: 4.3 G/DL
ALP BLD-CCNC: 140 U/L
ALT SERPL-CCNC: 33 U/L
ANION GAP SERPL CALC-SCNC: 10 MMOL/L
AST SERPL-CCNC: 33 U/L
BILIRUB SERPL-MCNC: 0.8 MG/DL
BUN SERPL-MCNC: 19 MG/DL
CALCIUM SERPL-MCNC: 9.1 MG/DL
CHLORIDE SERPL-SCNC: 105 MMOL/L
CHOLEST SERPL-MCNC: 147 MG/DL
CO2 SERPL-SCNC: 25 MMOL/L
CREAT SERPL-MCNC: 1.03 MG/DL
EGFR: 72 ML/MIN/1.73M2
ESTIMATED AVERAGE GLUCOSE: 126 MG/DL
GLUCOSE SERPL-MCNC: 101 MG/DL
HBA1C MFR BLD HPLC: 6 %
HCT VFR BLD CALC: 42.8 %
HDLC SERPL-MCNC: 40 MG/DL
HGB BLD-MCNC: 13.7 G/DL
LDLC SERPL CALC-MCNC: 79 MG/DL
MCHC RBC-ENTMCNC: 31.6 PG
MCHC RBC-ENTMCNC: 32 GM/DL
MCV RBC AUTO: 98.6 FL
NONHDLC SERPL-MCNC: 107 MG/DL
PLATELET # BLD AUTO: 186 K/UL
POTASSIUM SERPL-SCNC: 4.9 MMOL/L
PROT SERPL-MCNC: 7 G/DL
RBC # BLD: 4.34 M/UL
RBC # FLD: 13 %
SODIUM SERPL-SCNC: 140 MMOL/L
TRIGL SERPL-MCNC: 161 MG/DL
WBC # FLD AUTO: 5.85 K/UL

## 2023-09-01 PROCEDURE — G0444 DEPRESSION SCREEN ANNUAL: CPT | Mod: GC,59

## 2023-09-01 PROCEDURE — 80061 LIPID PANEL: CPT

## 2023-09-01 PROCEDURE — 80053 COMPREHEN METABOLIC PANEL: CPT

## 2023-09-01 PROCEDURE — 83036 HEMOGLOBIN GLYCOSYLATED A1C: CPT

## 2023-09-01 PROCEDURE — G0439: CPT | Mod: 25

## 2023-09-01 PROCEDURE — 84080 ASSAY ALKALINE PHOSPHATASES: CPT

## 2023-09-01 PROCEDURE — 85027 COMPLETE CBC AUTOMATED: CPT

## 2023-09-01 PROCEDURE — G0439: CPT

## 2023-09-02 NOTE — PLAN
[FreeTextEntry1] : 80 yo M Maori-speaking w/ hx of obesity, hepatic steatosis, and sebaceous back cyst (resolved s/p abx) presenting for CPE.  #Occipital Headache - for the past 3 weeks - Pain when moving head, R sided pressure, AM headaches, 5/10, not associated with physical activity, not position  - Cannot lie down on the right side, denies visual symptoms  - Worst in AM, slowly fades during the day - Does not notice changes in position - Unlikely 2/2 to subdural (normal neuro exam). Patient informed about alarm symptoms that require f/u with ED. - c/w Tylenol PRN   #Lightheadness  - associated with getting up quickly  - Likly secondary to dehdyration  - encouraged increase PO intake and hydration given patients job   #L/E edema - started after the surgery - Taking OTC medications for the pain  - Denies any weakness, noticed some skin darkening  - Varicosities with stasis dermatitis, unlikely cardiac in origin (no cp/SOB/orthopnea)  - Recommended elevating legs, ambulation and compression  - Script for Compression ([employer] 8-15mm given today)  #Recent Hospital June 2023- Subdural hematoma s/p bishop hole and drain and MMA embolization - f/u CT scan 7/31 - improvement of bilateral SDH - f/u CT Sep 2023  #HCM - Shingles - Plan to get in the pharmacy   - FLU - will make an appointment -**Colon Cancer - FOBT negative 2015 FOBT - d/w with patient about risk and benefits at this age. Patient would like to defer.  - PCV 20 - 7/28/2023 -Dexa scan 2021 - normal bone density ** Healthcare proxy - daughter as HCP - card filled out today 9/1/2023 -COVID vaccine on April 6 and May 7, Moderna -no urinary retention symptoms no PSA recommended at this time  - f/u 5 weeks after CT scan

## 2023-09-02 NOTE — ASSESSMENT
[FreeTextEntry1] : 80 yo M Jordanian-speaking w/ hx of obesity, hepatic steatosis, and sebaceous back cyst (resolved s/p abx) presenting for CPE, improving after recent hospitalization (June 2023) for subdural hematoma.

## 2023-09-02 NOTE — PHYSICAL EXAM
[No Acute Distress] : no acute distress [Normal Sclera/Conjunctiva] : normal sclera/conjunctiva [Normal Outer Ear/Nose] : the outer ears and nose were normal in appearance [No JVD] : no jugular venous distention [No Respiratory Distress] : no respiratory distress  [Clear to Auscultation] : lungs were clear to auscultation bilaterally [Normal Rate] : normal rate  [Normal S1, S2] : normal S1 and S2 [Soft] : abdomen soft [Non Tender] : non-tender [Non-distended] : non-distended [Normal Posterior Cervical Nodes] : no posterior cervical lymphadenopathy [Normal Anterior Cervical Nodes] : no anterior cervical lymphadenopathy [No CVA Tenderness] : no CVA  tenderness [No Joint Swelling] : no joint swelling [No Rash] : no rash [Coordination Grossly Intact] : coordination grossly intact [No Focal Deficits] : no focal deficits [Normal Gait] : normal gait [Normal Affect] : the affect was normal [Normal Insight/Judgement] : insight and judgment were intact [de-identified] : CN II-XII intact  [de-identified] : Tenderness to trapezius muscle, Pes[place]igo Hump  [de-identified] : LE edema b/l with noticable varicosities [de-identified] : Normal gait with toe and heel walk

## 2023-09-02 NOTE — HISTORY OF PRESENT ILLNESS
[Family Member] : family member [FreeTextEntry1] : This patient is presenting for a Complete Physical Exam. [de-identified] : 80 yo M North Korean-speaking w/ hx of obesity, hepatic steatosis, and sebaceous back cyst (resolved s/p abx) presenting for cpe  Interval History  #Occipital Headache - for the past 3 weeks - Pain when moving head, R sided pressure, AM headaches, 5/10, not associated with physical activity, not position  - Cannot lie down on the right side, denies visual symptoms  - Worst in AM, slowly fades during the day - Does not notice changes in position - takes Tylenol PRN   #Lightheadness  - associated with getting up quickly   #L/E edema - started after the surgery - Taking OTC medications for the pain  - Denies any weakness - noticed some skin darkening   Patient started working again since the neurosurgery.  Walks two miles, denies c/p, orthopnea, no abdominal pain, constipation. diarrhea - Dehydrated - doesn't drink much water - encouraged to drink more  Labs reviewed -  / a1c 6.0.   #Recent Hospital June 2023- Subdural hematoma s/p bishop hole and drain and MMA embolization - f/u CT scan 7/31 - improvement of bilateral SDH - f/u CT Sep 2023  Hepatic steatosis -f/u CBC CMP, alkphos isoenzym -Mediterranean diet counselled  Medications - no new medications, Tylenol PRN for headaches Social History  - Diet - Eats everything, including lots of soda consumption - Exercise - works as a   - Occupation - [employer]  - Home - lives at home wife, two nieces - Smoking - No history of smoking, Alcohol - No longer drinking after his recent fall after being intoxicated. In the past, he drank 1-2 beers 2-3 times a week.  - IVDU - None  HCM -Shingles - Plan to get in the pharmacy   - FLU - will make an appointment -**Colon Cancer - FOBT negative 2015 FOBT  - d/w with patient about risk and benefits at this age. Patient would like to defer.  - PCV 20 - 7/28/2023 -Dexa scan 2021 - normal bone density -healthcare proxy - daughter as HCP - card filled out today 9/1/2023 -COVID vaccine on April 6 and May 7, Moderna -no urinary retention symptoms no PSA recommended at this time

## 2023-09-02 NOTE — HEALTH RISK ASSESSMENT
[Good] : ~his/her~  mood as  good [Yes] : Yes [2 - 4 times a month (2 pts)] : 2-4 times a month (2 points) [1 or 2 (0 pts)] : 1 or 2 (0 points) [Never (0 pts)] : Never (0 points) [Any fall with injury in past year] : Patient reported fall with injury in the past year [0] : 2) Feeling down, depressed, or hopeless: Not at all (0) [PHQ-2 Negative - No further assessment needed] : PHQ-2 Negative - No further assessment needed [Independent] : using transportation [Some assistance needed] : managing finances [Reviewed updated] : Reviewed, updated [Designated Healthcare Proxy] : Designated healthcare proxy [I will adhere to the patient's wishes.] : I will adhere to the patient's wishes. [Never] : Never [0-4] : 0-4 [AdvancecareDate] : 09/23 [FreeTextEntry4] : Discussed with patient, Daughter would like to be HCP. They have extensively discussed his wishes including intubation, cardiac resuscitation etc.

## 2023-09-06 ENCOUNTER — APPOINTMENT (OUTPATIENT)
Dept: CT IMAGING | Facility: IMAGING CENTER | Age: 83
End: 2023-09-06
Payer: MEDICARE

## 2023-09-06 ENCOUNTER — OUTPATIENT (OUTPATIENT)
Dept: OUTPATIENT SERVICES | Facility: HOSPITAL | Age: 83
LOS: 1 days | End: 2023-09-06
Payer: MEDICARE

## 2023-09-06 DIAGNOSIS — S06.5XAA TRAUMATIC SUBDURAL HEMORRHAGE WITH LOSS OF CONSCIOUSNESS STATUS UNKNOWN, INITIAL ENCOUNTER: ICD-10-CM

## 2023-09-06 PROCEDURE — 70450 CT HEAD/BRAIN W/O DYE: CPT | Mod: 26

## 2023-09-06 PROCEDURE — 70450 CT HEAD/BRAIN W/O DYE: CPT

## 2023-09-08 DIAGNOSIS — I83.93 ASYMPTOMATIC VARICOSE VEINS OF BILATERAL LOWER EXTREMITIES: ICD-10-CM

## 2023-09-08 DIAGNOSIS — E65 LOCALIZED ADIPOSITY: ICD-10-CM

## 2023-09-08 DIAGNOSIS — M54.50 LOW BACK PAIN, UNSPECIFIED: ICD-10-CM

## 2023-09-08 DIAGNOSIS — Z00.00 ENCOUNTER FOR GENERAL ADULT MEDICAL EXAMINATION WITHOUT ABNORMAL FINDINGS: ICD-10-CM

## 2023-09-08 DIAGNOSIS — K76.0 FATTY (CHANGE OF) LIVER, NOT ELSEWHERE CLASSIFIED: ICD-10-CM

## 2023-09-08 DIAGNOSIS — S06.5XAA TRAUMATIC SUBDURAL HEMORRHAGE WITH LOSS OF CONSCIOUSNESS STATUS UNKNOWN, INITIAL ENCOUNTER: ICD-10-CM

## 2023-09-27 ENCOUNTER — APPOINTMENT (OUTPATIENT)
Dept: NEUROSURGERY | Facility: CLINIC | Age: 83
End: 2023-09-27
Payer: MEDICARE

## 2023-09-27 DIAGNOSIS — S06.5XAA TRAUMATIC SUBDURAL HEMORRHAGE WITH LOSS OF CONSCIOUSNESS STATUS UNKNOWN, INITIAL ENCOUNTER: ICD-10-CM

## 2023-09-27 PROCEDURE — 99442: CPT

## 2023-10-02 ENCOUNTER — NON-APPOINTMENT (OUTPATIENT)
Age: 83
End: 2023-10-02

## 2023-10-02 LAB
ALP BLD-CCNC: 141 IU/L
ALP BONE CFR SERPL: 39 %
ALP INTEST CFR SERPL: 15 %
ALP LIVER CFR SERPL: 46 %

## 2023-10-25 ENCOUNTER — APPOINTMENT (OUTPATIENT)
Age: 83
End: 2023-10-25

## 2023-10-25 ENCOUNTER — OUTPATIENT (OUTPATIENT)
Dept: OUTPATIENT SERVICES | Facility: HOSPITAL | Age: 83
LOS: 1 days | End: 2023-10-25
Payer: MEDICARE

## 2023-10-25 DIAGNOSIS — S06.5XAA TRAUMATIC SUBDURAL HEMORRHAGE WITH LOSS OF CONSCIOUSNESS STATUS UNKNOWN, INITIAL ENCOUNTER: ICD-10-CM

## 2023-10-25 DIAGNOSIS — M54.50 LOW BACK PAIN, UNSPECIFIED: ICD-10-CM

## 2023-10-25 DIAGNOSIS — I83.93 ASYMPTOMATIC VARICOSE VEINS OF BILATERAL LOWER EXTREMITIES: ICD-10-CM

## 2023-10-25 DIAGNOSIS — Z00.00 ENCOUNTER FOR GENERAL ADULT MEDICAL EXAMINATION WITHOUT ABNORMAL FINDINGS: ICD-10-CM

## 2023-10-25 DIAGNOSIS — Z23 ENCOUNTER FOR IMMUNIZATION: ICD-10-CM

## 2023-10-25 DIAGNOSIS — E65 LOCALIZED ADIPOSITY: ICD-10-CM

## 2023-10-25 DIAGNOSIS — K76.0 FATTY (CHANGE OF) LIVER, NOT ELSEWHERE CLASSIFIED: ICD-10-CM

## 2023-10-25 PROCEDURE — G0008: CPT

## 2023-10-25 PROCEDURE — 90662 IIV NO PRSV INCREASED AG IM: CPT

## 2023-10-25 PROCEDURE — 90656 IIV3 VACC NO PRSV 0.5 ML IM: CPT

## 2023-12-13 ENCOUNTER — NON-APPOINTMENT (OUTPATIENT)
Age: 83
End: 2023-12-13

## 2023-12-13 ENCOUNTER — APPOINTMENT (OUTPATIENT)
Dept: OPHTHALMOLOGY | Facility: CLINIC | Age: 83
End: 2023-12-13
Payer: MEDICARE

## 2023-12-13 PROCEDURE — 92012 INTRM OPH EXAM EST PATIENT: CPT

## 2024-02-07 ENCOUNTER — NON-APPOINTMENT (OUTPATIENT)
Age: 84
End: 2024-02-07

## 2024-02-07 ENCOUNTER — OUTPATIENT (OUTPATIENT)
Dept: OUTPATIENT SERVICES | Facility: HOSPITAL | Age: 84
LOS: 1 days | End: 2024-02-07
Payer: MEDICARE

## 2024-02-07 ENCOUNTER — APPOINTMENT (OUTPATIENT)
Dept: INTERNAL MEDICINE | Facility: CLINIC | Age: 84
End: 2024-02-07
Payer: MEDICARE

## 2024-02-07 VITALS
DIASTOLIC BLOOD PRESSURE: 80 MMHG | OXYGEN SATURATION: 98 % | SYSTOLIC BLOOD PRESSURE: 136 MMHG | BODY MASS INDEX: 36.06 KG/M2 | HEART RATE: 60 BPM | HEIGHT: 61 IN | WEIGHT: 191 LBS

## 2024-02-07 DIAGNOSIS — I10 ESSENTIAL (PRIMARY) HYPERTENSION: ICD-10-CM

## 2024-02-07 DIAGNOSIS — R00.1 BRADYCARDIA, UNSPECIFIED: ICD-10-CM

## 2024-02-07 PROCEDURE — 85025 COMPLETE CBC W/AUTO DIFF WBC: CPT

## 2024-02-07 PROCEDURE — G0463: CPT

## 2024-02-07 PROCEDURE — 80053 COMPREHEN METABOLIC PANEL: CPT

## 2024-02-07 PROCEDURE — 99214 OFFICE O/P EST MOD 30 MIN: CPT | Mod: GC,25

## 2024-02-08 NOTE — END OF VISIT
[] : Resident [FreeTextEntry3] : 82yo who presents for subacute SILVA and dizziness after a viral illness in his [place]. Ddx is broad but is primarily concerning for cardiac etiology. ECG today with significant bradycardia. SILVA could be sx bradycardia vs. angina, less likely pulm etiology given no wheezing/cough but also a consideration if cardiac work-up negative. Favor Cardiology referral for stress.

## 2024-02-08 NOTE — PHYSICAL EXAM
[No Acute Distress] : no acute distress [Well Nourished] : well nourished [Well Developed] : well developed [Well-Appearing] : well-appearing [Normal Sclera/Conjunctiva] : normal sclera/conjunctiva [PERRL] : pupils equal round and reactive to light [EOMI] : extraocular movements intact [Normal Outer Ear/Nose] : the outer ears and nose were normal in appearance [Normal Oropharynx] : the oropharynx was normal [No JVD] : no jugular venous distention [No Lymphadenopathy] : no lymphadenopathy [Supple] : supple [Thyroid Normal, No Nodules] : the thyroid was normal and there were no nodules present [No Respiratory Distress] : no respiratory distress  [No Accessory Muscle Use] : no accessory muscle use [Clear to Auscultation] : lungs were clear to auscultation bilaterally [Normal Rate] : normal rate  [Regular Rhythm] : with a regular rhythm [Normal S1, S2] : normal S1 and S2 [No Murmur] : no murmur heard [No Carotid Bruits] : no carotid bruits [No Abdominal Bruit] : a ~M bruit was not heard ~T in the abdomen [No Varicosities] : no varicosities [Pedal Pulses Present] : the pedal pulses are present [No Edema] : there was no peripheral edema [No Palpable Aorta] : no palpable aorta [No Extremity Clubbing/Cyanosis] : no extremity clubbing/cyanosis [Soft] : abdomen soft [Non Tender] : non-tender [Non-distended] : non-distended [No Masses] : no abdominal mass palpated [No HSM] : no HSM [Normal Bowel Sounds] : normal bowel sounds [Normal Posterior Cervical Nodes] : no posterior cervical lymphadenopathy [Normal Anterior Cervical Nodes] : no anterior cervical lymphadenopathy [No CVA Tenderness] : no CVA  tenderness [No Spinal Tenderness] : no spinal tenderness [No Joint Swelling] : no joint swelling [Grossly Normal Strength/Tone] : grossly normal strength/tone [No Rash] : no rash [Coordination Grossly Intact] : coordination grossly intact [No Focal Deficits] : no focal deficits [Normal Gait] : normal gait [Normal Affect] : the affect was normal [Normal Insight/Judgement] : insight and judgment were intact [de-identified] : bilateral leg swelling

## 2024-02-08 NOTE — ASSESSMENT
[FreeTextEntry1] : César Hernandez is an 82 yo Belarusian-speaking M w/ hx of obesity, hepatic steatosis, sebaceous back cyst (resolved s/p abx), and s/p Right bishop hole for evacuation of chronic subdural hematoma 6/16/23 + S/p Bilateral MMA embolization 6/16/23; presenting, accompanied by daughter, for exercise-induced SOB and dizziness.   # recent respiratory infection - sxs peaking in Mid-December 2023 (while in [facility]), mostly resolved after around 10 days - congestion, dry cough, fatigue, no fever - conservative treatment, pt also received two unknown vaccines in [facility]  # exertional dyspnea with lightheadedness and dizziness - occurs after around 1-2 miles of walking - also associated with getting up quickly - possibly exacerbated by dehdyration - encouraged pt to increase PO intake and hydration   #L/E edema - started after surgery in June 2023 - Taking OTC medications for the pain - Denies any weakness, noticed some skin darkening - Recommended elevating legs, ambulation and compression - in conjunction with exertional dyspnea, cardiac work-up needed, such as cardiac echo  # bradycardia - EKG with no further abnormalities - cardiology referral for echocardiogram  #HCM - f/u CBC, CMP  - received flu vaccine in Oct 2023 - PCV vaccine received in July 2023  Case discussed with Dr. Myron Sterling.  RCT for CPE in 3 months (or as appropriate).

## 2024-02-08 NOTE — REVIEW OF SYSTEMS
[Nail Changes] : nail changes [Negative] : Heme/Lymph [FreeTextEntry9] : bilateral leg swelling [de-identified] : brittle nails, possibly onycholysis, dry skin [FreeTextEntry1] : General leg swelling b/l

## 2024-02-08 NOTE — HISTORY OF PRESENT ILLNESS
[Family Member] : family member [FreeTextEntry8] : César Hernandez is an 80 yo Frisian-speaking M w/ hx of obesity, hepatic steatosis, sebaceous back cyst (resolved s/p abx), and s/p Right bishop hole for evacuation of chronic subdural hematoma 6/16/23 + S/p Bilateral MMA embolization 6/16/23; presenting, accompanied by daughter, for exercise-induced SOB and dizziness.  Pt has had recurrent dizziness and SOB more frequently since neurosurgery in June 2023. Fell last Saturday, Jan 26. Generally had decreasing exercise tolerance after coming back from trip to [facility] on January 16, now only 1-2 miles walking distance before sxs start (pt becomes pale, sweaty, and dizzy, sometimes headache as well). Same distances that were easier for him before now seem difficult. Now, after longer walking, the pt needs to stop to regain his breath. Pt drinks water only occasionally (AM: 2-3 glasses; nighttime another glass). Denies alcohol use. In terms of  diet, pt  drinks coffee and eats (sweet) bread in AM; has rice, beans, chicken, and salads for lunch; rice and meat for dinner. 14lbs weight gain after surgery in June 2023. Pt also experiences lightheadedness when getting up in the morning. Needs to sit at the side of the bed first (2-3 times a week in the morning). His legs feel fine at the moment, no significant swelling or pain, but pt has intermittent leg swelling. Recent respiratory infection in [facility] in December with dry cough and congestion, no fever. Received "powder with water", one AM, and one in the afternoon, after meal. Two vaccine shots were given (unknown). Overall significant improvement while in [facility]. Pt denies sleep apnea.

## 2024-02-09 LAB
ALBUMIN SERPL ELPH-MCNC: 4.5 G/DL
ALP BLD-CCNC: 140 U/L
ALT SERPL-CCNC: 31 U/L
ANION GAP SERPL CALC-SCNC: 6 MMOL/L
AST SERPL-CCNC: 27 U/L
BASOPHILS # BLD AUTO: 0.02 K/UL
BASOPHILS NFR BLD AUTO: 0.3 %
BILIRUB SERPL-MCNC: 0.5 MG/DL
BUN SERPL-MCNC: 14 MG/DL
CALCIUM SERPL-MCNC: 9.5 MG/DL
CHLORIDE SERPL-SCNC: 104 MMOL/L
CO2 SERPL-SCNC: 32 MMOL/L
CREAT SERPL-MCNC: 1.1 MG/DL
EGFR: 67 ML/MIN/1.73M2
EOSINOPHIL # BLD AUTO: 0.15 K/UL
EOSINOPHIL NFR BLD AUTO: 2.2 %
GLUCOSE SERPL-MCNC: 79 MG/DL
HCT VFR BLD CALC: 48.1 %
HGB BLD-MCNC: 15.1 G/DL
IMM GRANULOCYTES NFR BLD AUTO: 0.6 %
LYMPHOCYTES # BLD AUTO: 2.1 K/UL
LYMPHOCYTES NFR BLD AUTO: 31.4 %
MAN DIFF?: NORMAL
MCHC RBC-ENTMCNC: 31.1 PG
MCHC RBC-ENTMCNC: 31.4 GM/DL
MCV RBC AUTO: 99 FL
MONOCYTES # BLD AUTO: 0.59 K/UL
MONOCYTES NFR BLD AUTO: 8.8 %
NEUTROPHILS # BLD AUTO: 3.78 K/UL
NEUTROPHILS NFR BLD AUTO: 56.7 %
PLATELET # BLD AUTO: 198 K/UL
POTASSIUM SERPL-SCNC: 4.9 MMOL/L
PROT SERPL-MCNC: 6.9 G/DL
RBC # BLD: 4.86 M/UL
RBC # FLD: 13.6 %
SODIUM SERPL-SCNC: 142 MMOL/L
WBC # FLD AUTO: 6.68 K/UL

## 2024-02-15 DIAGNOSIS — R00.1 BRADYCARDIA, UNSPECIFIED: ICD-10-CM

## 2024-03-04 ENCOUNTER — APPOINTMENT (OUTPATIENT)
Dept: CARDIOLOGY | Facility: CLINIC | Age: 84
End: 2024-03-04
Payer: MEDICARE

## 2024-03-04 VITALS
HEIGHT: 61 IN | DIASTOLIC BLOOD PRESSURE: 84 MMHG | SYSTOLIC BLOOD PRESSURE: 159 MMHG | WEIGHT: 191 LBS | OXYGEN SATURATION: 97 % | BODY MASS INDEX: 36.06 KG/M2 | HEART RATE: 58 BPM

## 2024-03-04 PROCEDURE — G2211 COMPLEX E/M VISIT ADD ON: CPT | Mod: NC,1L

## 2024-03-04 PROCEDURE — 99205 OFFICE O/P NEW HI 60 MIN: CPT | Mod: 25

## 2024-03-04 PROCEDURE — 93000 ELECTROCARDIOGRAM COMPLETE: CPT

## 2024-03-04 RX ORDER — ZOSTER VACCINE RECOMBINANT, ADJUVANTED 50 MCG/0.5
50 KIT INTRAMUSCULAR
Qty: 1 | Refills: 0 | Status: DISCONTINUED | COMMUNITY
Start: 2022-09-09 | End: 2024-03-04

## 2024-03-15 ENCOUNTER — OUTPATIENT (OUTPATIENT)
Dept: OUTPATIENT SERVICES | Facility: HOSPITAL | Age: 84
LOS: 1 days | End: 2024-03-15
Payer: MEDICARE

## 2024-03-15 ENCOUNTER — TRANSCRIPTION ENCOUNTER (OUTPATIENT)
Age: 84
End: 2024-03-15

## 2024-03-15 VITALS
TEMPERATURE: 98 F | OXYGEN SATURATION: 98 % | RESPIRATION RATE: 20 BRPM | HEIGHT: 64.96 IN | SYSTOLIC BLOOD PRESSURE: 161 MMHG | WEIGHT: 190.92 LBS | HEART RATE: 66 BPM | DIASTOLIC BLOOD PRESSURE: 70 MMHG

## 2024-03-15 VITALS
HEART RATE: 71 BPM | SYSTOLIC BLOOD PRESSURE: 121 MMHG | RESPIRATION RATE: 20 BRPM | OXYGEN SATURATION: 95 % | DIASTOLIC BLOOD PRESSURE: 62 MMHG

## 2024-03-15 DIAGNOSIS — I20.0 UNSTABLE ANGINA: ICD-10-CM

## 2024-03-15 LAB
ANION GAP SERPL CALC-SCNC: 13 MMOL/L — SIGNIFICANT CHANGE UP (ref 5–17)
BUN SERPL-MCNC: 26 MG/DL — HIGH (ref 7–23)
CALCIUM SERPL-MCNC: 9.3 MG/DL — SIGNIFICANT CHANGE UP (ref 8.4–10.5)
CHLORIDE SERPL-SCNC: 104 MMOL/L — SIGNIFICANT CHANGE UP (ref 96–108)
CO2 SERPL-SCNC: 21 MMOL/L — LOW (ref 22–31)
CREAT SERPL-MCNC: 1.11 MG/DL — SIGNIFICANT CHANGE UP (ref 0.5–1.3)
EGFR: 65 ML/MIN/1.73M2 — SIGNIFICANT CHANGE UP
GLUCOSE SERPL-MCNC: 111 MG/DL — HIGH (ref 70–99)
HCT VFR BLD CALC: 44.9 % — SIGNIFICANT CHANGE UP (ref 39–50)
HGB BLD-MCNC: 14.5 G/DL — SIGNIFICANT CHANGE UP (ref 13–17)
MCHC RBC-ENTMCNC: 30.8 PG — SIGNIFICANT CHANGE UP (ref 27–34)
MCHC RBC-ENTMCNC: 32.3 GM/DL — SIGNIFICANT CHANGE UP (ref 32–36)
MCV RBC AUTO: 95.3 FL — SIGNIFICANT CHANGE UP (ref 80–100)
NRBC # BLD: 0 /100 WBCS — SIGNIFICANT CHANGE UP (ref 0–0)
PLATELET # BLD AUTO: 191 K/UL — SIGNIFICANT CHANGE UP (ref 150–400)
POTASSIUM SERPL-MCNC: 4.1 MMOL/L — SIGNIFICANT CHANGE UP (ref 3.5–5.3)
POTASSIUM SERPL-SCNC: 4.1 MMOL/L — SIGNIFICANT CHANGE UP (ref 3.5–5.3)
RBC # BLD: 4.71 M/UL — SIGNIFICANT CHANGE UP (ref 4.2–5.8)
RBC # FLD: 13.2 % — SIGNIFICANT CHANGE UP (ref 10.3–14.5)
SODIUM SERPL-SCNC: 138 MMOL/L — SIGNIFICANT CHANGE UP (ref 135–145)
WBC # BLD: 6.6 K/UL — SIGNIFICANT CHANGE UP (ref 3.8–10.5)
WBC # FLD AUTO: 6.6 K/UL — SIGNIFICANT CHANGE UP (ref 3.8–10.5)

## 2024-03-15 PROCEDURE — 99152 MOD SED SAME PHYS/QHP 5/>YRS: CPT

## 2024-03-15 PROCEDURE — C1894: CPT

## 2024-03-15 PROCEDURE — 93005 ELECTROCARDIOGRAM TRACING: CPT

## 2024-03-15 PROCEDURE — 93454 CORONARY ARTERY ANGIO S&I: CPT | Mod: 26

## 2024-03-15 PROCEDURE — 80048 BASIC METABOLIC PNL TOTAL CA: CPT

## 2024-03-15 PROCEDURE — 93010 ELECTROCARDIOGRAM REPORT: CPT

## 2024-03-15 PROCEDURE — 85027 COMPLETE CBC AUTOMATED: CPT

## 2024-03-15 PROCEDURE — C1769: CPT

## 2024-03-15 PROCEDURE — C1887: CPT

## 2024-03-15 PROCEDURE — 93454 CORONARY ARTERY ANGIO S&I: CPT

## 2024-03-15 RX ORDER — LATANOPROST 0.05 MG/ML
1 SOLUTION/ DROPS OPHTHALMIC; TOPICAL
Refills: 0 | DISCHARGE

## 2024-03-15 NOTE — H&P CARDIOLOGY - NSICDXPASTMEDICALHX_GEN_ALL_CORE_FT
PAST MEDICAL HISTORY:  Bilateral subdural hematomas     BPH (benign prostatic hyperplasia)     Hepatic steatosis     History of glaucoma

## 2024-03-15 NOTE — H&P CARDIOLOGY - HISTORY OF PRESENT ILLNESS
Cardiologist: Dr. Salvador Allan  Neurosurgeon: Dr. Gucci Moore  Pharmacy: [facility]    85 y/o Upper sorbian-speaking male w/ PMHx of bilateral subdural hematoma s/p R bishop hole and b/l MMA embolization 6/2023 (stable interval CTH 9/2023), EtOH use, Glaucoma, Hepatic steatosis, BPH initially presented to outpatient PCP's office c/o dizziness, SOB/SILVA when ambulating short distances. Patient's EKG at the time revealed bradycardia 53bpm, and pt was referred to cardiologist for further evaluation. In light of pt's risk factors, CCS class II anginal-equivalent symptoms; pt referred to [facility] for recommended cardiac catheterization w/ possible intervention if clinically indicated. Denies headaches, changes in vision, dizziness, chest pain, palpitations, abdominal pain, N/V/D, leg pain/edema, BRBPR, melena, hematuria, BRBPR, or any other complaints.     ID #[id number]

## 2024-03-15 NOTE — ASU DISCHARGE PLAN (ADULT/PEDIATRIC) - CARE PROVIDER_API CALL
Salvador Allan  Interventional Cardiology  [address] [place] [address]  Phone: [phone]  Fax: [phone]  Established Patient  Follow Up Time: 1 month

## 2024-03-15 NOTE — H&P CARDIOLOGY - RS GEN PE MLT RESP DETAILS PC
Decreased bibasilar breath sounds/airway patent/breath sounds equal/good air movement/respirations non-labored/clear to auscultation bilaterally/no chest wall tenderness

## 2024-03-15 NOTE — ASU DISCHARGE PLAN (ADULT/PEDIATRIC) - ASU DC SPECIAL INSTRUCTIONSFT
Wound Care:   the day AFTER your procedure remove bandage GENTLY, and clean using  mild soap and gentle warm, water stream, pat dry. leave OPEN to air. YOU MAY SHOWER   DO NOT apply lotions, creams, ointments, powder, perfumes to your incision site  DO NOT SOAK your site for 1 week ( no baths, no pools, no tubs, etc...)  Check  your groin and/or wrist daily. A small amount of bruising, and soreness are normal    ACTIVITY: for 24 hours   - DO NOT DRIVE  - DO NOT make any important decisions or sign legal documents   - DO NOT operate heavy machineries   - you may resume sexual activity in 48 hours, unless otherwise instructed by your cardiologist     If your procedure was done through the WRIST: for the NEXT 3 DAYS  - avoid pushing, pulling, with that affected wrist   - avoid repeated movement of that hand and wrist ( eg: typing, hammering)  - DO NOT LIFT anything more than 5 lbs     If your procedure was done through the GROIN: for the NEXT 5 DAYS  - Limit climbing stairs, DO NOT soak in bathtub or pool  - no strenuous activities, pushing, pulling, straining  - Do not lift anything 10lbs or heavier     MEDICATION:   take your medications as explained ( see discharge paperwork)   If you received a STENT, you will be taking antiplatelet medications to KEEP YOUR STENT OPEN ( eg: Aspirin, Plavix, Brilinta, Effient, etc).  Take as prescribed DO NOT STOP taking them without consulting with your cardiologist first.     Follow heart healthy diet recommended by your doctor, , if you smoke STOP SMOKING ( may call [phone] for center of tobacco control if you need assistance)     CALL your doctor to make appointment in 2 WEEKS     ***CALL YOUR DOCTOR***  if you experience: fever, chills, body aches, or severe pain, swelling, redness, heat or yellow discharge at incision site  If you experience Bleeding or excruciating pain at the procedural site, swelling (golf ball size) at your procedural site  If you experience CHEST PAIN  If you experience extremity numbness, tingling, temperature change (of your procedural site)   If you are unable to reach your doctor, you may contact:   -Cardiology Office at [facility] at [phone] or   - [facility] [phone]  - [facility] [phone]

## 2024-03-15 NOTE — H&P CARDIOLOGY - ALCOHOL USE HISTORY SINGLE SELECT
November 29, 2022      Sanjuana Mccracken  [address]  [address] [facility] [address]    Dear Sanjuana:     You have been made inactive on our Kidney transplant waitlist as of 11/29/2022. Your waitlist status has changed because:     [x]  Work up incomplete     While you are inactive on our transplant waitlist, you will continue to accumulate wait time, but you are currently not eligible for a transplant if a matching organ becomes available for you. Please contact your transplant coordinator for any questions related to your current waitlist status [phone].    Our organ transplant waiting list is overseen by the [employer] ([employer]). [employer] is a national non-profit organization that administers the nation's only transplant organ waiting list.  We are including the [employer] ([employer]) Patient Information Letter, as required. You may call OS to discuss problems or concerns you have experienced with any transplant center. Also, we encourage you to ask any member of the Transplant Team here at [facility] to answer any questions you may have.     Please do not hesitate to contact us with any questions or concerns.     Sincerely,      Chavez Duron MD   Abdominal Transplant Program  [facility]    cc: [facility]  [address] [address]  Phone: [phone]  Fax: [phone]     Enclosure: OS Patient Information Letter   yes...

## 2024-03-15 NOTE — ASU DISCHARGE PLAN (ADULT/PEDIATRIC) - CALL YOUR DOCTOR IF YOU HAVE ANY OF THE FOLLOWING:
Bleeding that does not stop/Swelling that gets worse/Pain not relieved by Medications/Fever greater than (need to indicate Fahrenheit or Celsius)/Wound/Surgical Site with redness, or foul smelling discharge or pus/Numbness, tingling, color or temperature change to extremity/Nausea and vomiting that does not stop Estimated Blood Loss (Cc): 5 cc

## 2024-03-27 ENCOUNTER — APPOINTMENT (OUTPATIENT)
Dept: CARDIOLOGY | Facility: CLINIC | Age: 84
End: 2024-03-27
Payer: MEDICARE

## 2024-03-27 VITALS
OXYGEN SATURATION: 97 % | SYSTOLIC BLOOD PRESSURE: 142 MMHG | BODY MASS INDEX: 35.87 KG/M2 | WEIGHT: 190 LBS | DIASTOLIC BLOOD PRESSURE: 81 MMHG | HEIGHT: 61 IN | HEART RATE: 55 BPM

## 2024-03-27 DIAGNOSIS — R07.9 CHEST PAIN, UNSPECIFIED: ICD-10-CM

## 2024-03-27 PROBLEM — Z86.69 PERSONAL HISTORY OF OTHER DISEASES OF THE NERVOUS SYSTEM AND SENSE ORGANS: Chronic | Status: ACTIVE | Noted: 2024-03-15

## 2024-03-27 PROBLEM — N40.0 BENIGN PROSTATIC HYPERPLASIA WITHOUT LOWER URINARY TRACT SYMPTOMS: Chronic | Status: ACTIVE | Noted: 2024-03-15

## 2024-03-27 PROBLEM — S06.5XAA TRAUMATIC SUBDURAL HEMORRHAGE WITH LOSS OF CONSCIOUSNESS STATUS UNKNOWN, INITIAL ENCOUNTER: Chronic | Status: ACTIVE | Noted: 2024-03-15

## 2024-03-27 PROBLEM — K76.0 FATTY (CHANGE OF) LIVER, NOT ELSEWHERE CLASSIFIED: Chronic | Status: ACTIVE | Noted: 2024-03-15

## 2024-03-27 PROCEDURE — G2211 COMPLEX E/M VISIT ADD ON: CPT | Mod: NC,1L

## 2024-03-27 PROCEDURE — 99215 OFFICE O/P EST HI 40 MIN: CPT | Mod: 25

## 2024-03-27 PROCEDURE — 93000 ELECTROCARDIOGRAM COMPLETE: CPT

## 2024-04-14 NOTE — HISTORY OF PRESENT ILLNESS
[FreeTextEntry1] : 84 year old male without significant cardiac risks other than FH of CAD and no prior history who presents with 2 weeks of progressive chest prain on minimal exertion.  No SOB or H/A

## 2024-04-14 NOTE — DISCUSSION/SUMMARY
[FreeTextEntry1] : CP- new onset sx on minimal exertion.  Proceed directly to cath  Followupin 3 weeks  Time spent reviewing history, MD notes, exam, EKG, pt discussion and note writing [EKG obtained to assist in diagnosis and management of assessed problem(s)] : EKG obtained to assist in diagnosis and management of assessed problem(s)

## 2024-05-05 PROBLEM — R07.9 CHEST PAIN ON EXERTION: Status: ACTIVE | Noted: 2024-04-14

## 2024-05-05 NOTE — HISTORY OF PRESENT ILLNESS
[FreeTextEntry1] : 84 year old male without significant cardiac risks other than FH of CAD and no prior history who presents with 2 weeks of progressive chest prain on minimal exertion.  No SOB or H/A.  Cath with mild nonobstructive CAD and sx have improved

## 2024-05-05 NOTE — DISCUSSION/SUMMARY
[FreeTextEntry1] : CP- mild NOD on cath.  Medical therapy  Followupin 6 mths  Time spent reviewing history, cath films, MD notes, exam, EKG, pt discussion and note writing [EKG obtained to assist in diagnosis and management of assessed problem(s)] : EKG obtained to assist in diagnosis and management of assessed problem(s)

## 2024-05-15 ENCOUNTER — APPOINTMENT (OUTPATIENT)
Dept: OPHTHALMOLOGY | Facility: CLINIC | Age: 84
End: 2024-05-15

## 2024-05-29 ENCOUNTER — APPOINTMENT (OUTPATIENT)
Dept: OPHTHALMOLOGY | Facility: CLINIC | Age: 84
End: 2024-05-29
Payer: MEDICARE

## 2024-05-29 ENCOUNTER — NON-APPOINTMENT (OUTPATIENT)
Age: 84
End: 2024-05-29

## 2024-05-29 PROCEDURE — 92014 COMPRE OPH EXAM EST PT 1/>: CPT | Mod: 25

## 2024-05-29 PROCEDURE — 92083 EXTENDED VISUAL FIELD XM: CPT

## 2024-06-12 ENCOUNTER — APPOINTMENT (OUTPATIENT)
Dept: INTERNAL MEDICINE | Facility: CLINIC | Age: 84
End: 2024-06-12
Payer: MEDICARE

## 2024-06-12 ENCOUNTER — OUTPATIENT (OUTPATIENT)
Dept: OUTPATIENT SERVICES | Facility: HOSPITAL | Age: 84
LOS: 1 days | End: 2024-06-12
Payer: MEDICARE

## 2024-06-12 ENCOUNTER — LABORATORY RESULT (OUTPATIENT)
Age: 84
End: 2024-06-12

## 2024-06-12 VITALS
OXYGEN SATURATION: 97 % | DIASTOLIC BLOOD PRESSURE: 70 MMHG | BODY MASS INDEX: 36.44 KG/M2 | HEIGHT: 61 IN | WEIGHT: 193 LBS | HEART RATE: 56 BPM | SYSTOLIC BLOOD PRESSURE: 130 MMHG

## 2024-06-12 DIAGNOSIS — R05.3 CHRONIC COUGH: ICD-10-CM

## 2024-06-12 DIAGNOSIS — I10 ESSENTIAL (PRIMARY) HYPERTENSION: ICD-10-CM

## 2024-06-12 PROCEDURE — 84443 ASSAY THYROID STIM HORMONE: CPT

## 2024-06-12 PROCEDURE — 84439 ASSAY OF FREE THYROXINE: CPT

## 2024-06-12 PROCEDURE — 82746 ASSAY OF FOLIC ACID SERUM: CPT

## 2024-06-12 PROCEDURE — 82607 VITAMIN B-12: CPT

## 2024-06-12 PROCEDURE — 99213 OFFICE O/P EST LOW 20 MIN: CPT | Mod: GE

## 2024-06-12 PROCEDURE — G0463: CPT

## 2024-06-12 NOTE — PROGRESS NOTE ADULT - CRITICAL CARE SERVICES PROVIDED
Addended by: SKY ROSE on: 6/12/2024 02:49 PM     Modules accepted: Orders    
Patient is critically ill, requiring critical care services.
Patient is critically ill, requiring critical care services.

## 2024-06-14 LAB
FOLATE SERPL-MCNC: 17.2 NG/ML
TSH SERPL-ACNC: 9.55 UIU/ML
VIT B12 SERPL-MCNC: 385 PG/ML

## 2024-06-17 NOTE — HEALTH RISK ASSESSMENT
[PHQ-2 Negative - No further assessment needed] : PHQ-2 Negative - No further assessment needed [[id number]] : 0

## 2024-06-17 NOTE — PHYSICAL EXAM
[Normal] : normal gait, coordination grossly intact, no focal deficits and deep tendon reflexes were 2+ and symmetric [de-identified] : No deformities, no joint nodules or deviations  [de-identified] : +hyperpigmentation of hands, thick skin without breaks

## 2024-06-17 NOTE — HISTORY OF PRESENT ILLNESS
[de-identified] : 84 year old male with hx of subdural hematoma s/p bishop hole and b/l MMA embolization, varicose veins, and ETOH use presenting for a follow-up appointment. He presents with his daughter.   His daughter mentions that he was recently evaluated for a chronic cough that she remembers to have been occurring since she can first remember. She mentions that the cough is dry and occurs intermittently throughout the day. No exacerbating factors, history of tobacco use remotely in the past. 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 No fever, chills. No recent travel or sick contacts. Evaluated for cardiac etiology, however, with a negative LHC performed recently by Dr. Allan. Without chest pain, palpitations, exertional symptoms.   In addition, he complaints of numbness that occurs in a glove and stocking pattern. About 6 months and persistent in nature without further progression proximally or muscle weakness. No trauma.

## 2024-06-17 NOTE — ASSESSMENT
[FreeTextEntry1] : 84 year old male with hx of subdural hematoma s/p bishop hole and b/l MMA embolization, varicose veins, and ETOH use presenting for a follow-up appointment. He presents with his daughter.   #chronic cough  Differential includes infections, inflammatory, functional. Higher on list include intrinsic lung disease such as restrictive disease.  - pulmonary consult for PFTs - sleep specialist  - CXR - TTE  #numbness  - b12, TSH, folate  - A1c pre-DM range but has been known to cause this  - neurology referral for EMG  Case discussed with Dr. Galvan. RTC in 10-weeks. Post-encounter form provided.

## 2024-06-24 DIAGNOSIS — R05.3 CHRONIC COUGH: ICD-10-CM

## 2024-06-26 ENCOUNTER — APPOINTMENT (OUTPATIENT)
Dept: RADIOLOGY | Facility: CLINIC | Age: 84
End: 2024-06-26
Payer: MEDICARE

## 2024-06-26 ENCOUNTER — OUTPATIENT (OUTPATIENT)
Dept: OUTPATIENT SERVICES | Facility: HOSPITAL | Age: 84
LOS: 1 days | End: 2024-06-26
Payer: MEDICARE

## 2024-06-26 DIAGNOSIS — R05.3 CHRONIC COUGH: ICD-10-CM

## 2024-06-26 PROCEDURE — 71045 X-RAY EXAM CHEST 1 VIEW: CPT

## 2024-06-26 PROCEDURE — 71045 X-RAY EXAM CHEST 1 VIEW: CPT | Mod: 26

## 2024-07-30 ENCOUNTER — APPOINTMENT (OUTPATIENT)
Dept: NEUROLOGY | Facility: HOSPITAL | Age: 84
End: 2024-07-30
Payer: MEDICARE

## 2024-07-30 ENCOUNTER — OUTPATIENT (OUTPATIENT)
Dept: OUTPATIENT SERVICES | Facility: HOSPITAL | Age: 84
LOS: 1 days | End: 2024-07-30
Payer: MEDICARE

## 2024-07-30 DIAGNOSIS — R51.9 HEADACHE, UNSPECIFIED: ICD-10-CM

## 2024-07-30 DIAGNOSIS — R20.2 PARESTHESIA OF SKIN: ICD-10-CM

## 2024-07-30 PROCEDURE — 99204 OFFICE O/P NEW MOD 45 MIN: CPT

## 2024-07-30 NOTE — PLAN
[TextEntry] : 84 yo M w/ hx of ETOH use, glaucoma and subdural hematoma reports to the clinic for first time visit. Neurology was referred for EMG 2/2 numbness.   Impression: Paresthesia in stocking glove sensation DDx: Small fiber neuropathy, unsure of etiology  Recommendations - Check B1, B6, Folate, B12 (Prior Folate 17.2, B12 borderline low 385), Hb1AC, spep - Paraneoplastic panel - EMG ordered to confirm neuropathy - Prescribed B12 1000 daily for 30 days - discussed gabapentin use but he denies zuleika pain - will defer for now and reassess. - F/u appointment in 2 months, will reassess medications at that time

## 2024-07-30 NOTE — HISTORY OF PRESENT ILLNESS
[FreeTextEntry1] : 82 yo M w/ hx of ETOH use, glaucoma and subdural hematoma reports to the clinic for first time visit. Neurology was referred for EMG 2/2 numbness. Patient suffered a fall around May 2023 and 3-4 weeks later, patient reported to the hospital for AMS and progressive left sided weakness. CTH showed 2.3 cm right convex subdural hematoma causing compression and midline shift. Patient then underwent right bishop hole evacuation of subdural hematoma and bilateral MMA embolization. Patient was d/c on Keppra 500mg BID for seizure prophylaxis. Patient finished his f.u with NSGY and does not take Keppra anymore.   Patient states b/l hand and forearm numbness/burning started about a year around September 2023. He reports the sensations has been getting worse with movement but constant through out the day. Patient now is concerned that his bilateral feet feel numb and he is scared he will fall. Patient does not report any new falls.   Patient is a former daily alcohol user for 20-30 years (1 pint of hard liquor), social smoker but no hx of recreational drugs such as cocaine, heroin or marijuana. Patient and family are unsure of any family history of neurological conditions. Patient is not taking any medications at this time.   Family at bedside prefer to translate herself On exam with attending,  was used. ([id number] - Shira)

## 2024-07-30 NOTE — END OF VISIT
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 exam as per resident note - notably with good strength. dtrs are hypoactive and absence in both AJs.   check EMG, check neuropathy labs. rtc 2m [Time Spent: ___ minutes] : I have spent [unfilled] minutes of time on the encounter.

## 2024-07-30 NOTE — PHYSICAL EXAM
[Motor Tone] : muscle tone was normal in all four extremities [Motor Strength] : muscle strength was normal in all four extremities [Motor Handedness Right-Handed] : the patient is right hand dominant [5] : ankle plantar flexion 5/5 [Sensation Pain / Temperature Decrease] : pain and temperature was intact [Proprioception] : proprioception was intact [Glove / Stocking] : decreased in a glove and stocking distribution [Vibration Decrease Arm / Hand Bilateral] : decreased in both arms and hands [Vibration Decrease Leg / Foot Both Ankles] : decreased at both ankles [Vibration Decrease Leg / Foot Toes Both Feet] : decreased at the toes of both feet  [Abnormal Walk] : normal gait [Balance] : balance was intact [1+] : Ankle jerk left 1+ [Motor Strength Upper Extremities Bilaterally] : strength was normal in both upper extremities [Motor Strength Lower Extremities Bilaterally] : strength was normal in both lower extremities [___] : absent on the right [___] : absent on the left

## 2024-07-30 NOTE — HISTORY OF PRESENT ILLNESS
[FreeTextEntry1] : 84 yo M w/ hx of ETOH use, glaucoma and subdural hematoma reports to the clinic for first time visit. Neurology was referred for EMG 2/2 numbness. Patient suffered a fall around May 2023 and 3-4 weeks later, patient reported to the hospital for AMS and progressive left sided weakness. CTH showed 2.3 cm right convex subdural hematoma causing compression and midline shift. Patient then underwent right bishop hole evacuation of subdural hematoma and bilateral MMA embolization. Patient was d/c on Keppra 500mg BID for seizure prophylaxis. Patient finished his f.u with NSGY and does not take Keppra anymore.   Patient states b/l hand and forearm numbness/burning started about a year around September 2023. He reports the sensations has been getting worse with movement but constant through out the day. Patient now is concerned that his bilateral feet feel numb and he is scared he will fall. Patient does not report any new falls.   Patient is a former daily alcohol user for 20-30 years (1 pint of hard liquor), social smoker but no hx of recreational drugs such as cocaine, heroin or marijuana. Patient and family are unsure of any family history of neurological conditions. Patient is not taking any medications at this time.   Family at bedside prefer to translate herself On exam with attending,  was used. ([id number] - Shira)

## 2024-07-31 DIAGNOSIS — G62.9 POLYNEUROPATHY, UNSPECIFIED: ICD-10-CM

## 2024-07-31 DIAGNOSIS — Z71.9 COUNSELING, UNSPECIFIED: ICD-10-CM

## 2024-07-31 DIAGNOSIS — R20.2 PARESTHESIA OF SKIN: ICD-10-CM

## 2024-07-31 LAB
A1C WITH ESTIMATED AVERAGE GLUCOSE RESULT: 6 % — HIGH (ref 4–5.6)
ESTIMATED AVERAGE GLUCOSE: 126 MG/DL — HIGH (ref 68–114)
VIT B12 SERPL-MCNC: 339 PG/ML — SIGNIFICANT CHANGE UP (ref 232–1245)

## 2024-07-31 PROCEDURE — 86255 FLUORESCENT ANTIBODY SCREEN: CPT

## 2024-07-31 PROCEDURE — 83519 RIA NONANTIBODY: CPT

## 2024-07-31 PROCEDURE — 82607 VITAMIN B-12: CPT

## 2024-07-31 PROCEDURE — 84425 ASSAY OF VITAMIN B-1: CPT

## 2024-07-31 PROCEDURE — 83036 HEMOGLOBIN GLYCOSYLATED A1C: CPT

## 2024-07-31 PROCEDURE — 84207 ASSAY OF VITAMIN B-6: CPT

## 2024-07-31 PROCEDURE — 86596 VOLTAGE-GTD CA CHNL ANTB EA: CPT

## 2024-07-31 PROCEDURE — G0463: CPT

## 2024-08-04 LAB
PYRIDOXAL PHOS SERPL-MCNC: 11 UG/L — SIGNIFICANT CHANGE UP (ref 3.4–65.2)
VIT B1 SERPL-MCNC: 134.2 NMOL/L — SIGNIFICANT CHANGE UP (ref 66.5–200)

## 2024-08-06 ENCOUNTER — NON-APPOINTMENT (OUTPATIENT)
Age: 84
End: 2024-08-06

## 2024-08-07 LAB
AMPHIPHYSIN AB TITR SER: NEGATIVE — SIGNIFICANT CHANGE UP
CRMP-5-IGG WESTERN BLOT: NEGATIVE — SIGNIFICANT CHANGE UP
GLIAL NUC TYPE 1 AB TITR SER: NEGATIVE — SIGNIFICANT CHANGE UP
HU1 AB TITR SER: NEGATIVE — SIGNIFICANT CHANGE UP
HU2 AB TITR SER IF: NEGATIVE — SIGNIFICANT CHANGE UP
HU3 AB TITR SER: NEGATIVE — SIGNIFICANT CHANGE UP
IFA NOTES: SIGNIFICANT CHANGE UP
P/Q TYPE ANTIBODY: 0 NMOL/L — SIGNIFICANT CHANGE UP
PARANEOPLASTIC AB SER-IMP: SIGNIFICANT CHANGE UP
PCA-1 AB TITR SER: NEGATIVE — SIGNIFICANT CHANGE UP
PCA-2 AB TITR SER: NEGATIVE — SIGNIFICANT CHANGE UP
PCA-TR AB TITR SER: NEGATIVE — SIGNIFICANT CHANGE UP
VGKC AB SER-SCNC: 0 NMOL/L — SIGNIFICANT CHANGE UP

## 2024-08-14 ENCOUNTER — APPOINTMENT (OUTPATIENT)
Dept: CV DIAGNOSITCS | Facility: HOSPITAL | Age: 84
End: 2024-08-14

## 2024-08-14 ENCOUNTER — RESULT REVIEW (OUTPATIENT)
Age: 84
End: 2024-08-14

## 2024-08-19 ENCOUNTER — LABORATORY RESULT (OUTPATIENT)
Age: 84
End: 2024-08-19

## 2024-08-19 ENCOUNTER — OUTPATIENT (OUTPATIENT)
Dept: OUTPATIENT SERVICES | Facility: HOSPITAL | Age: 84
LOS: 1 days | End: 2024-08-19
Payer: MEDICARE

## 2024-08-19 ENCOUNTER — APPOINTMENT (OUTPATIENT)
Dept: INTERNAL MEDICINE | Facility: CLINIC | Age: 84
End: 2024-08-19
Payer: MEDICARE

## 2024-08-19 VITALS
HEART RATE: 60 BPM | WEIGHT: 191.4 LBS | SYSTOLIC BLOOD PRESSURE: 120 MMHG | BODY MASS INDEX: 36.14 KG/M2 | HEIGHT: 61 IN | DIASTOLIC BLOOD PRESSURE: 60 MMHG | OXYGEN SATURATION: 94 %

## 2024-08-19 DIAGNOSIS — Z00.00 ENCOUNTER FOR GENERAL ADULT MEDICAL EXAMINATION W/OUT ABNORMAL FINDINGS: ICD-10-CM

## 2024-08-19 DIAGNOSIS — I10 ESSENTIAL (PRIMARY) HYPERTENSION: ICD-10-CM

## 2024-08-19 DIAGNOSIS — R73.09 OTHER ABNORMAL GLUCOSE: ICD-10-CM

## 2024-08-19 DIAGNOSIS — B35.1 TINEA UNGUIUM: ICD-10-CM

## 2024-08-19 DIAGNOSIS — G62.9 POLYNEUROPATHY, UNSPECIFIED: ICD-10-CM

## 2024-08-19 DIAGNOSIS — E03.9 HYPOTHYROIDISM, UNSPECIFIED: ICD-10-CM

## 2024-08-19 PROCEDURE — 86334 IMMUNOFIX E-PHORESIS SERUM: CPT

## 2024-08-19 PROCEDURE — 99214 OFFICE O/P EST MOD 30 MIN: CPT | Mod: GC

## 2024-08-19 PROCEDURE — 84439 ASSAY OF FREE THYROXINE: CPT

## 2024-08-19 PROCEDURE — 84165 PROTEIN E-PHORESIS SERUM: CPT

## 2024-08-19 PROCEDURE — 84443 ASSAY THYROID STIM HORMONE: CPT

## 2024-08-19 PROCEDURE — 83521 IG LIGHT CHAINS FREE EACH: CPT

## 2024-08-19 PROCEDURE — G0463: CPT

## 2024-08-19 PROCEDURE — 84155 ASSAY OF PROTEIN SERUM: CPT

## 2024-08-19 PROCEDURE — 86618 LYME DISEASE ANTIBODY: CPT

## 2024-08-19 RX ORDER — GABAPENTIN 100 MG/1
100 CAPSULE ORAL
Qty: 90 | Refills: 1 | Status: ACTIVE | COMMUNITY
Start: 2024-08-19 | End: 1900-01-01

## 2024-08-22 NOTE — HISTORY OF PRESENT ILLNESS
[FreeTextEntry1] : "numbness" [de-identified] : César Hernandez is an 80 yo Uruguayan-speaking M w/ hx of obesity, hepatic steatosis, sebaceous back cyst (resolved s/p abx), and s/p Right bishop hole for evacuation of chronic subdural hematoma 6/16/23 + S/p Bilateral MMA embolization 6/16/23; presenting for follow up.  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

## 2024-08-22 NOTE — ASSESSMENT
[FreeTextEntry1] : César Hernandez is an 80 yo [place]ayan-speaking M w/ hx of obesity, hepatic steatosis, sebaceous back cyst, chronic subdural hematoma, presenting for follow up for

## 2024-08-22 NOTE — HISTORY OF PRESENT ILLNESS
[FreeTextEntry1] : "numbness" [de-identified] : César Hernandez is an 82 yo [place] Leonean-speaking M w/ hx of obesity, hepatic steatosis, sebaceous back cyst (resolved s/p abx), and s/p Right bishop hole for evacuation of chronic subdural hematoma 6/16/23 + S/p Bilateral MMA embolization 6/16/23; presenting for follow up.  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

## 2024-08-22 NOTE — ASSESSMENT
[FreeTextEntry1] : César Hernandez is an 80 yo Cuban-speaking M w/ hx of obesity, hepatic steatosis, sebaceous back cyst, chronic subdural hematoma, presenting for follow up for

## 2024-08-22 NOTE — PLAN
[FreeTextEntry1] : #Neuropathy, unspecified Etiology: Considering diabetic, thyroid, maligancy   - Labs: SPEP, Lyme disease, TSH, T4 - Low normal B12 - Start Gabapentin 100 mg QHS - c/w B12 supplement - Follows with Neurology  #Elevated A1c - Last A1c 6.0%, 06/24 - Encouraged dietary and lifestyle modification  #Onycomycosis - Trialed topical antifungal treatments in the past w/o resolution - Dermatology referral   RTC - 5-15 weeks; Follow up neuropathy (increase gabapentin?) Case discussed with Dr. Marcial Thurman MD

## 2024-08-23 PROBLEM — E03.9 HYPOTHYROIDISM, UNSPECIFIED TYPE: Status: ACTIVE | Noted: 2024-08-23

## 2024-08-23 LAB
ALBUMIN MFR SERPL ELPH: 58.7 %
ALBUMIN SERPL-MCNC: 4.1 G/DL
ALBUMIN/GLOB SERPL: 1.5 RATIO
ALPHA1 GLOB MFR SERPL ELPH: 3.2 %
ALPHA1 GLOB SERPL ELPH-MCNC: 0.2 G/DL
ALPHA2 GLOB MFR SERPL ELPH: 7.6 %
ALPHA2 GLOB SERPL ELPH-MCNC: 0.5 G/DL
B-GLOBULIN MFR SERPL ELPH: 14 %
B-GLOBULIN SERPL ELPH-MCNC: 1 G/DL
DEPRECATED KAPPA LC FREE/LAMBDA SER: 1.25 RATIO
GAMMA GLOB FLD ELPH-MCNC: 1.1 G/DL
GAMMA GLOB MFR SERPL ELPH: 16.5 %
INTERPRETATION SERPL IEP-IMP: NORMAL
KAPPA LC CSF-MCNC: 2.59 MG/DL
KAPPA LC SERPL-MCNC: 3.23 MG/DL
M PROTEIN SPEC IFE-MCNC: NORMAL
PROT SERPL-MCNC: 6.9 G/DL
PROT SERPL-MCNC: 6.9 G/DL
T4 FREE SERPL-MCNC: 0.9 NG/DL
TSH SERPL-ACNC: 10.6 UIU/ML

## 2024-08-23 RX ORDER — LEVOTHYROXINE SODIUM 25 UG/1
25 CAPSULE ORAL DAILY
Qty: 60 | Refills: 0 | Status: ACTIVE | COMMUNITY
Start: 2024-08-23 | End: 1900-01-01

## 2024-08-26 DIAGNOSIS — B35.1 TINEA UNGUIUM: ICD-10-CM

## 2024-09-06 ENCOUNTER — APPOINTMENT (OUTPATIENT)
Dept: DERMATOLOGY | Facility: CLINIC | Age: 84
End: 2024-09-06
Payer: MEDICARE

## 2024-09-06 DIAGNOSIS — L60.8 OTHER NAIL DISORDERS: ICD-10-CM

## 2024-09-06 DIAGNOSIS — Z87.891 PERSONAL HISTORY OF NICOTINE DEPENDENCE: ICD-10-CM

## 2024-09-06 PROCEDURE — 99203 OFFICE O/P NEW LOW 30 MIN: CPT

## 2024-09-06 NOTE — HISTORY OF PRESENT ILLNESS
[FreeTextEntry1] : NPA- nails [de-identified] : Mr. Hernandez is a 85 y/o F here for evaluation of below, daughter Beth acting as  per pt choice  #dark lines on nails R 1st, 2nd, 3rd - noticed it for about a year - no personal or FH skin cancer - does have hx of onychomycosis in b/l finger nails and is using topical antifungal  - of note, does endorse recent fatigue, is being worked up and followed by his cardiologist and pulm    No other changing or concerning lesions. No itchy, growing, bleeding, painful, or changing moles.  Personal hx of skin cancer: no FHx of skin cancer: no Social Hx: Here with daughter, Beth

## 2024-09-06 NOTE — HISTORY OF PRESENT ILLNESS
[FreeTextEntry1] : NPA- nails [de-identified] : Mr. Hernandez is a 85 y/o F here for evaluation of below, daughter Beth acting as  per pt choice  #dark lines on nails R 1st, 2nd, 3rd - noticed it for about a year - no personal or FH skin cancer - does have hx of onychomycosis in b/l finger nails and is using topical antifungal  - of note, does endorse recent fatigue, is being worked up and followed by his cardiologist and pulm    No other changing or concerning lesions. No itchy, growing, bleeding, painful, or changing moles.  Personal hx of skin cancer: no FHx of skin cancer: no Social Hx: Here with daughter, Beth

## 2024-09-06 NOTE — ASSESSMENT
[FreeTextEntry1] :  #Longitudinal melanonychia  R first, second, third fingernails ddx: favor melanocyte activation, ethnic variation - appears benign today, as multiple bands on multiple nails, less concerning for a malignant neoplasm - Abcdes reviewed. Discussed if hurts, bleeds, starts growing or extends to the proximal nail matrix, should return for evaluation   - Photographs and measurements taken today - RTC 6 months, sooner if changing   #onychomychosis - pt not too bothered, can continue using topical antifungal that hes been using - deferring oral tx   RTC 6 months or PRN

## 2024-09-06 NOTE — PHYSICAL EXAM
[FreeTextEntry3] : R thumbnail with multiple longitudinal hyperpigmented bands measuring: medial: 3.0mm, lateral: 2.5mm, most lateral band: 2mm R second fingernail longitudinal hyperpigmented band measuring 1.75mm R third fingernail with longitudinal hyperpigmented band measurin.75mm with pseudo-thomas sign  Other fingers with general darkening of fingernails and subungal debris without well demarcated linear bands

## 2024-09-09 ENCOUNTER — APPOINTMENT (OUTPATIENT)
Dept: NEUROLOGY | Facility: CLINIC | Age: 84
End: 2024-09-09
Payer: MEDICARE

## 2024-09-09 PROCEDURE — 95886 MUSC TEST DONE W/N TEST COMP: CPT

## 2024-09-09 PROCEDURE — 95913 NRV CNDJ TEST 13/> STUDIES: CPT

## 2024-09-24 ENCOUNTER — OUTPATIENT (OUTPATIENT)
Dept: OUTPATIENT SERVICES | Facility: HOSPITAL | Age: 84
LOS: 1 days | End: 2024-09-24
Payer: MEDICARE

## 2024-09-24 ENCOUNTER — APPOINTMENT (OUTPATIENT)
Dept: NEUROLOGY | Facility: HOSPITAL | Age: 84
End: 2024-09-24
Payer: MEDICARE

## 2024-09-24 VITALS
OXYGEN SATURATION: 96 % | DIASTOLIC BLOOD PRESSURE: 74 MMHG | BODY MASS INDEX: 35.3 KG/M2 | TEMPERATURE: 97.4 F | SYSTOLIC BLOOD PRESSURE: 136 MMHG | RESPIRATION RATE: 14 BRPM | HEART RATE: 57 BPM | HEIGHT: 61 IN | WEIGHT: 187 LBS

## 2024-09-24 DIAGNOSIS — M79.652 PAIN IN LEFT THIGH: ICD-10-CM

## 2024-09-24 DIAGNOSIS — R51.9 HEADACHE, UNSPECIFIED: ICD-10-CM

## 2024-09-24 PROCEDURE — 83036 HEMOGLOBIN GLYCOSYLATED A1C: CPT

## 2024-09-24 PROCEDURE — 84207 ASSAY OF VITAMIN B-6: CPT

## 2024-09-24 PROCEDURE — 84425 ASSAY OF VITAMIN B-1: CPT

## 2024-09-24 PROCEDURE — G0463: CPT

## 2024-09-24 PROCEDURE — 99212 OFFICE O/P EST SF 10 MIN: CPT

## 2024-09-24 PROCEDURE — 83519 RIA NONANTIBODY: CPT

## 2024-09-24 PROCEDURE — 86596 VOLTAGE-GTD CA CHNL ANTB EA: CPT

## 2024-09-24 PROCEDURE — 86255 FLUORESCENT ANTIBODY SCREEN: CPT

## 2024-09-24 PROCEDURE — 36415 COLL VENOUS BLD VENIPUNCTURE: CPT

## 2024-09-24 RX ORDER — GABAPENTIN 300 MG/1
300 TABLET ORAL DAILY
Qty: 7 | Refills: 0 | Status: ACTIVE | COMMUNITY
Start: 2024-09-24 | End: 1900-01-01

## 2024-09-26 DIAGNOSIS — G62.9 POLYNEUROPATHY, UNSPECIFIED: ICD-10-CM

## 2024-09-26 NOTE — PHYSICAL EXAM
[General Appearance - Alert] : alert [General Appearance - In No Acute Distress] : in no acute distress [General Appearance - Well Nourished] : well nourished [General Appearance - Well Developed] : well developed [General Appearance - Well-Appearing] : healthy appearing [] : normal voice and communication [Oriented To Time, Place, And Person] : oriented to person, place, and time [Impaired Insight] : insight and judgment were intact [Affect] : the affect was normal [Mood] : the mood was normal [Memory Recent] : recent memory was not impaired [Memory Remote] : remote memory was not impaired [Person] : oriented to person [Place] : oriented to place [Time] : oriented to time [Short Term Intact] : short term memory intact [Remote Intact] : remote memory intact [Registration Intact] : recent registration memory intact [Span Intact] : the attention span was normal [Concentration Intact] : normal concentrating ability [Visual Intact] : visual attention was ~T not ~L decreased [Naming Objects] : no difficulty naming common objects [Repeating Phrases] : no difficulty repeating a phrase [Fluency] : fluency intact [Comprehension] : comprehension intact [Vocabulary] : adequate range of vocabulary [Cranial Nerves Optic (II)] : visual acuity intact bilaterally,  visual fields full to confrontation, pupils equal round and reactive to light [Cranial Nerves Oculomotor (III)] : extraocular motion intact [Cranial Nerves Trigeminal (V)] : facial sensation intact symmetrically [Cranial Nerves Facial (VII)] : face symmetrical [Cranial Nerves Vestibulocochlear (VIII)] : hearing was intact bilaterally [Cranial Nerves Glossopharyngeal (IX)] : tongue and palate midline [Cranial Nerves Accessory (XI - Cranial And Spinal)] : head turning and shoulder shrug symmetric [Cranial Nerves Hypoglossal (XII)] : there was no tongue deviation with protrusion [Motor Tone] : muscle tone was normal in all four extremities [Motor Strength] : muscle strength was normal in all four extremities [Involuntary Movements] : no involuntary movements were seen [No Muscle Atrophy] : normal bulk in all four extremities [Tactile Decrease Entire Leg Right] : diminished right leg [1+] : Ankle jerk left 1+ [Paresis Pronator Drift Right-Sided] : no pronator drift on the right [Paresis Pronator Drift Left-Sided] : no pronator drift on the left [Motor Strength Upper Extremities Bilaterally] : strength was normal in both upper extremities [Motor Strength Lower Extremities Bilaterally] : strength was normal in both lower extremities [Romberg's Sign] : Romberg's sign was negtive [Tactile Decrease Hand] : normal left hand [Tactile Decrease Entire Leg Left] : normal left leg [Tremor] : no tremor present [Coordination - Dysmetria Impaired Finger-to-Nose Bilateral] : not present

## 2024-09-26 NOTE — PHYSICAL EXAM
done [General Appearance - Alert] : alert [General Appearance - In No Acute Distress] : in no acute distress [General Appearance - Well Nourished] : well nourished [General Appearance - Well Developed] : well developed [General Appearance - Well-Appearing] : healthy appearing [] : normal voice and communication [Oriented To Time, Place, And Person] : oriented to person, place, and time [Impaired Insight] : insight and judgment were intact [Affect] : the affect was normal [Mood] : the mood was normal [Memory Recent] : recent memory was not impaired [Memory Remote] : remote memory was not impaired [Person] : oriented to person [Place] : oriented to place [Time] : oriented to time [Short Term Intact] : short term memory intact [Remote Intact] : remote memory intact [Registration Intact] : recent registration memory intact [Span Intact] : the attention span was normal [Concentration Intact] : normal concentrating ability [Visual Intact] : visual attention was ~T not ~L decreased [Naming Objects] : no difficulty naming common objects [Repeating Phrases] : no difficulty repeating a phrase [Fluency] : fluency intact [Comprehension] : comprehension intact [Vocabulary] : adequate range of vocabulary [Cranial Nerves Optic (II)] : visual acuity intact bilaterally,  visual fields full to confrontation, pupils equal round and reactive to light [Cranial Nerves Oculomotor (III)] : extraocular motion intact [Cranial Nerves Trigeminal (V)] : facial sensation intact symmetrically [Cranial Nerves Facial (VII)] : face symmetrical [Cranial Nerves Vestibulocochlear (VIII)] : hearing was intact bilaterally [Cranial Nerves Glossopharyngeal (IX)] : tongue and palate midline [Cranial Nerves Accessory (XI - Cranial And Spinal)] : head turning and shoulder shrug symmetric [Cranial Nerves Hypoglossal (XII)] : there was no tongue deviation with protrusion [Motor Tone] : muscle tone was normal in all four extremities [Motor Strength] : muscle strength was normal in all four extremities [Involuntary Movements] : no involuntary movements were seen [No Muscle Atrophy] : normal bulk in all four extremities [Tactile Decrease Entire Leg Right] : diminished right leg [1+] : Ankle jerk left 1+ [Paresis Pronator Drift Right-Sided] : no pronator drift on the right [Paresis Pronator Drift Left-Sided] : no pronator drift on the left [Motor Strength Upper Extremities Bilaterally] : strength was normal in both upper extremities [Motor Strength Lower Extremities Bilaterally] : strength was normal in both lower extremities [Romberg's Sign] : Romberg's sign was negtive [Tactile Decrease Hand] : normal left hand [Tactile Decrease Entire Leg Left] : normal left leg [Tremor] : no tremor present [Coordination - Dysmetria Impaired Finger-to-Nose Bilateral] : not present

## 2024-09-26 NOTE — HISTORY OF PRESENT ILLNESS
[FreeTextEntry1] : *** 9/24/24 *** Patient here for neuropathy follow-up. Daughter is also present and assisting with [place] translation; he declined  use. He states the numbness is about the same. He localizes it to the entire hand (B/l), feet, and calves. The sensation is constant. No associated tingling or weakness. He does sometimes get stiff in the hands, especially when waking up or using an eating utensil. He also complains of 6 months of squeezing pain on L lateral thigh, not present at rest but triggered by walking. PCP prescribed him gabapentin 100mg qHS 1 month ago and this is helping with the pain. He also takes Tylenol and gets relief with its use.  In the interim, he got EMG studies with Dr Mortensen (9/10/24): b/l severe carpal tunnel syndrome and peripheral sensorimotor axonal neuropathy. Serum studies showed normal folate (17.2) and mildly decreased B12 (385), for which he is taking 1000mcg daily supplement.   *** 7/30/24 *** 82 yo M w/ hx of ETOH use, glaucoma and subdural hematoma reports to the clinic for first time visit. Neurology was referred for EMG 2/2 numbness. Patient suffered a fall around May 2023 and 3-4 weeks later, patient reported to the hospital for AMS and progressive left sided weakness. CTH showed 2.3 cm right convex subdural hematoma causing compression and midline shift. Patient then underwent right bishop hole evacuation of subdural hematoma and bilateral MMA embolization. Patient was d/c on Keppra 500mg BID for seizure prophylaxis. Patient finished his f.u with NSGY and does not take Keppra anymore.  Patient states b/l hand and forearm numbness/burning started about a year around September 2023. He reports the sensations has been getting worse with movement but constant through out the day. Patient now is concerned that his bilateral feet feel numb and he is scared he will fall. Patient does not report any new falls.  Patient is a former daily alcohol user for 20-30 years (1 pint of hard liquor), social smoker but no hx of recreational drugs such as cocaine, heroin or marijuana. Patient and family are unsure of any family history of neurological conditions. Patient is not taking any medications at this time.  Family at bedside prefer to translate herself On exam with attending,  was used. ([id number] - Shira)

## 2024-09-26 NOTE — HISTORY OF PRESENT ILLNESS
[FreeTextEntry1] : *** 9/24/24 *** Patient here for neuropathy follow-up. Daughter is also present and assisting with Kyrgyz translation; he declined  use. He states the numbness is about the same. He localizes it to the entire hand (B/l), feet, and calves. The sensation is constant. No associated tingling or weakness. He does sometimes get stiff in the hands, especially when waking up or using an eating utensil. He also complains of 6 months of squeezing pain on L lateral thigh, not present at rest but triggered by walking. PCP prescribed him gabapentin 100mg qHS 1 month ago and this is helping with the pain. He also takes Tylenol and gets relief with its use.  In the interim, he got EMG studies with Dr Mortensen (9/10/24): b/l severe carpal tunnel syndrome and peripheral sensorimotor axonal neuropathy. Serum studies showed normal folate (17.2) and mildly decreased B12 (385), for which he is taking 1000mcg daily supplement.   *** 7/30/24 *** 84 yo M w/ hx of ETOH use, glaucoma and subdural hematoma reports to the clinic for first time visit. Neurology was referred for EMG 2/2 numbness. Patient suffered a fall around May 2023 and 3-4 weeks later, patient reported to the hospital for AMS and progressive left sided weakness. CTH showed 2.3 cm right convex subdural hematoma causing compression and midline shift. Patient then underwent right bishop hole evacuation of subdural hematoma and bilateral MMA embolization. Patient was d/c on Keppra 500mg BID for seizure prophylaxis. Patient finished his f.u with NSGY and does not take Keppra anymore.  Patient states b/l hand and forearm numbness/burning started about a year around September 2023. He reports the sensations has been getting worse with movement but constant through out the day. Patient now is concerned that his bilateral feet feel numb and he is scared he will fall. Patient does not report any new falls.  Patient is a former daily alcohol user for 20-30 years (1 pint of hard liquor), social smoker but no hx of recreational drugs such as cocaine, heroin or marijuana. Patient and family are unsure of any family history of neurological conditions. Patient is not taking any medications at this time.  Family at bedside prefer to translate herself On exam with attending,  was used. ([id number] - Shira)

## 2024-09-26 NOTE — DISCUSSION/SUMMARY
[FreeTextEntry1] : 84y M with Hx heavy alcohol use (now abstinent), SDH s/p evacuation, neuropathy, who presents for follow-up for neuropathy. We will increase the gabapentin to give him better relief for the L thigh pain. He is not complaining of weakness in the hands and the numbness is affecting the entire hands, so will defer hand surgeon referral for now; patient and family were advised to let us know if symptoms worsen. We also send for bloodwork that is still waiting from prior appointment  Recommendations: [] increase gabapentin to 300mg qHS for 1 week, followed by BID indefinitely [] serum studies: vitamin B1, B6, A1c, paraneoplastic panel [] RTC in 3 months  Case discussed w/ attending Dr Madison

## 2024-09-30 ENCOUNTER — APPOINTMENT (OUTPATIENT)
Dept: CARDIOLOGY | Facility: CLINIC | Age: 84
End: 2024-09-30

## 2024-09-30 ENCOUNTER — APPOINTMENT (OUTPATIENT)
Dept: PULMONOLOGY | Facility: CLINIC | Age: 84
End: 2024-09-30
Payer: MEDICARE

## 2024-09-30 VITALS
BODY MASS INDEX: 35.3 KG/M2 | OXYGEN SATURATION: 97 % | HEIGHT: 61 IN | SYSTOLIC BLOOD PRESSURE: 149 MMHG | DIASTOLIC BLOOD PRESSURE: 79 MMHG | HEART RATE: 90 BPM | WEIGHT: 187 LBS

## 2024-09-30 VITALS
RESPIRATION RATE: 15 BRPM | HEART RATE: 67 BPM | BODY MASS INDEX: 36.82 KG/M2 | WEIGHT: 195 LBS | OXYGEN SATURATION: 97 % | SYSTOLIC BLOOD PRESSURE: 137 MMHG | HEIGHT: 61 IN | TEMPERATURE: 97 F | DIASTOLIC BLOOD PRESSURE: 72 MMHG

## 2024-09-30 DIAGNOSIS — R05.9 COUGH, UNSPECIFIED: ICD-10-CM

## 2024-09-30 DIAGNOSIS — R06.83 SNORING: ICD-10-CM

## 2024-09-30 DIAGNOSIS — R06.00 DYSPNEA, UNSPECIFIED: ICD-10-CM

## 2024-09-30 PROCEDURE — G2211 COMPLEX E/M VISIT ADD ON: CPT

## 2024-09-30 PROCEDURE — 99204 OFFICE O/P NEW MOD 45 MIN: CPT

## 2024-09-30 PROCEDURE — 99214 OFFICE O/P EST MOD 30 MIN: CPT | Mod: 25

## 2024-09-30 PROCEDURE — 93000 ELECTROCARDIOGRAM COMPLETE: CPT

## 2024-09-30 RX ORDER — GABAPENTIN 300 MG/1
300 CAPSULE ORAL TWICE DAILY
Qty: 120 | Refills: 0 | Status: DISCONTINUED | COMMUNITY
Start: 2024-09-24 | End: 2024-09-30

## 2024-09-30 NOTE — PHYSICAL EXAM
[No Acute Distress] : no acute distress [No Deformities] : no deformities [Enlarged Base of the Tongue] : enlarged base of the tongue [IV] : Mallampati Class: IV [Normal Appearance] : normal appearance [No Neck Mass] : no neck mass [Normal Rate/Rhythm] : normal rate/rhythm [Normal S1, S2] : normal s1, s2 [No Murmurs] : no murmurs [No Resp Distress] : no resp distress [Clear to Auscultation Bilaterally] : clear to auscultation bilaterally [No Clubbing] : no clubbing [No Cyanosis] : no cyanosis [No Focal Deficits] : no focal deficits [Oriented x3] : oriented x3 [Normal Affect] : normal affect [2+ Pitting] : 2+ pitting [TextBox_105] : 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

## 2024-09-30 NOTE — REVIEW OF SYSTEMS
[Recent Wt Gain (___ Lbs)] : ~T recent [unfilled] lb weight gain [Cough] : cough [SOB on Exertion] : sob on exertion [Headache] : headache [Obesity] : obesity [Negative] : Psychiatric [EDS] : no eds [Wheezing] : no wheezing

## 2024-09-30 NOTE — HISTORY OF PRESENT ILLNESS
[Never] : never [Awakes with Dry Mouth] : awakes with dry mouth [Awakes with Headache] : awakes with headache [Recent  Weight Gain] : recent weight gain [Snoring] : snoring [Witnessed Apneas] : witnessed apneas [TextBox_4] : This is an 84-year-old male with a significant past medical history of obesity, neuropathy and chronic chough who comes in for multiple complaints.   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 He describes the cough as coming out of nowhere and being quite strong at times, even interrupting activities such as watching TV or attending [employer]. He also reports shortness of breath and fatigue, which he initially thought were related to heart issues. He denies smoking but mentions occasional social drinking in his youth. He has not had a lung study or a CAT scan in the past, except for a recent one in 2023, which was done following a head injury. He reports weight gain from 188 lbs last month to 195 lbs currently. He remains physically active, walking daily and assisting with his son's company, without experiencing significant shortness of breath during these activities. [Awakes Unrefreshed] : does not awaken unrefreshed [Frequent Nocturnal Awakening] : denies frequent nocturnal awakening [Unusual Sleep Behavior] : no unusual sleep behavior [Hypersomnolence] : no hypersomnolence [Cataplexy] : no cataplexy [Hypnagogic Hallucinations] : no hypnagogic hallucinations [Hypnopompic Hallucinations] : no hypnopompic hallucinations [Lower Extremity Discomfort] : does not have lower extremity discomfort [Irresistible urge to move legs] : does not have irresistible urge to move legs [LE discomfort relieved by movement] : denies lower extremity discomfort relieved by movement [Late day/ Evening symptoms] : does not have late day/ evening symptoms [Sleep Disturbances due to LE symptoms] : denies sleep disturbances due to lower extremity symptoms [ESS] : 10

## 2024-09-30 NOTE — ASSESSMENT
[FreeTextEntry1] : This is an 84-year-old male with a significant past medical history of obesity, neuropathy and chronic chough who comes in for multiple complaints.   1. 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  2. Shortness of breath and fatigue: The patient experiences shortness of breath and fatigue, which he initially thought were related to heart issues. Recent CAT scan results were normal, showing no fibrosis, water, or emphysema.  3. Sleep apnea: The patient reports snoring and episodes of shortness of breath while at rest, suggesting possible sleep apnea.  - Conduct a Pulmonary Function Test (PFT) to assess lung function. - Prescribe medication for the cough and evaluate its effectiveness. - Arrange for a home sleep study to assess for sleep apnea. - Schedule follow-up appointments for the PFT and sleep study results.  The patient will follow up in 3-4 month after completing the Pulmonary Function Test and home sleep study.

## 2024-10-23 ENCOUNTER — APPOINTMENT (OUTPATIENT)
Dept: INTERNAL MEDICINE | Facility: CLINIC | Age: 84
End: 2024-10-23
Payer: MEDICARE

## 2024-10-23 VITALS
DIASTOLIC BLOOD PRESSURE: 70 MMHG | BODY MASS INDEX: 36.82 KG/M2 | HEIGHT: 61 IN | WEIGHT: 195 LBS | OXYGEN SATURATION: 98 % | HEART RATE: 73 BPM | SYSTOLIC BLOOD PRESSURE: 120 MMHG

## 2024-10-23 DIAGNOSIS — E66.9 OBESITY, UNSPECIFIED: ICD-10-CM

## 2024-10-23 DIAGNOSIS — G62.9 POLYNEUROPATHY, UNSPECIFIED: ICD-10-CM

## 2024-10-23 DIAGNOSIS — B35.1 TINEA UNGUIUM: ICD-10-CM

## 2024-10-23 DIAGNOSIS — E03.9 HYPOTHYROIDISM, UNSPECIFIED: ICD-10-CM

## 2024-10-23 DIAGNOSIS — Z00.00 ENCOUNTER FOR GENERAL ADULT MEDICAL EXAMINATION W/OUT ABNORMAL FINDINGS: ICD-10-CM

## 2024-10-23 DIAGNOSIS — L72.3 SEBACEOUS CYST: ICD-10-CM

## 2024-10-23 DIAGNOSIS — Z12.11 ENCOUNTER FOR SCREENING FOR MALIGNANT NEOPLASM OF COLON: ICD-10-CM

## 2024-10-23 PROCEDURE — 99214 OFFICE O/P EST MOD 30 MIN: CPT | Mod: GC,25

## 2024-10-23 PROCEDURE — G0444 DEPRESSION SCREEN ANNUAL: CPT | Mod: 59

## 2024-10-23 RX ORDER — LEVOTHYROXINE SODIUM 0.05 MG/1
50 TABLET ORAL DAILY
Qty: 90 | Refills: 3 | Status: ACTIVE | COMMUNITY
Start: 2024-10-23 | End: 1900-01-01

## 2024-10-28 ENCOUNTER — OUTPATIENT (OUTPATIENT)
Dept: OUTPATIENT SERVICES | Facility: HOSPITAL | Age: 84
LOS: 1 days | End: 2024-10-28
Payer: MEDICARE

## 2024-10-28 ENCOUNTER — LABORATORY RESULT (OUTPATIENT)
Age: 84
End: 2024-10-28

## 2024-10-28 ENCOUNTER — APPOINTMENT (OUTPATIENT)
Dept: SLEEP CENTER | Facility: CLINIC | Age: 84
End: 2024-10-28
Payer: MEDICARE

## 2024-10-28 PROCEDURE — 95800 SLP STDY UNATTENDED: CPT

## 2024-10-28 PROCEDURE — 95800 SLP STDY UNATTENDED: CPT | Mod: 26

## 2024-10-29 ENCOUNTER — APPOINTMENT (OUTPATIENT)
Dept: OPHTHALMOLOGY | Facility: CLINIC | Age: 84
End: 2024-10-29
Payer: MEDICARE

## 2024-10-29 ENCOUNTER — NON-APPOINTMENT (OUTPATIENT)
Age: 84
End: 2024-10-29

## 2024-10-29 PROCEDURE — 92012 INTRM OPH EXAM EST PATIENT: CPT | Mod: 25

## 2024-10-29 PROCEDURE — 92083 EXTENDED VISUAL FIELD XM: CPT

## 2024-10-29 PROCEDURE — 92133 CPTRZD OPH DX IMG PST SGM ON: CPT

## 2024-10-30 DIAGNOSIS — G47.33 OBSTRUCTIVE SLEEP APNEA (ADULT) (PEDIATRIC): ICD-10-CM

## 2024-10-30 LAB
ALBUMIN SERPL ELPH-MCNC: 4.2 G/DL
ALP BLD-CCNC: 141 U/L
ALT SERPL-CCNC: 28 U/L
ANION GAP SERPL CALC-SCNC: 11 MMOL/L
AST SERPL-CCNC: 30 U/L
BILIRUB SERPL-MCNC: 0.6 MG/DL
BUN SERPL-MCNC: 19 MG/DL
CALCIUM SERPL-MCNC: 9.3 MG/DL
CHLORIDE SERPL-SCNC: 104 MMOL/L
CHOLEST SERPL-MCNC: 152 MG/DL
CO2 SERPL-SCNC: 27 MMOL/L
CREAT SERPL-MCNC: 1.06 MG/DL
EGFR: 69 ML/MIN/1.73M2
ESTIMATED AVERAGE GLUCOSE: 117 MG/DL
GLUCOSE SERPL-MCNC: 101 MG/DL
HBA1C MFR BLD HPLC: 5.7 %
HCT VFR BLD CALC: 40.9 %
HDLC SERPL-MCNC: 36 MG/DL
HGB BLD-MCNC: 13.4 G/DL
LDLC SERPL CALC-MCNC: 77 MG/DL
MCHC RBC-ENTMCNC: 32.1 PG
MCHC RBC-ENTMCNC: 32.8 GM/DL
MCV RBC AUTO: 97.8 FL
NONHDLC SERPL-MCNC: 116 MG/DL
PLATELET # BLD AUTO: 195 K/UL
POTASSIUM SERPL-SCNC: 4.7 MMOL/L
PROT SERPL-MCNC: 6.7 G/DL
RBC # BLD: 4.18 M/UL
RBC # FLD: 12.9 %
SODIUM SERPL-SCNC: 142 MMOL/L
TRIGL SERPL-MCNC: 239 MG/DL
TSH SERPL-ACNC: 8.58 UIU/ML
VIT B12 SERPL-MCNC: 1443 PG/ML
WBC # FLD AUTO: 7.61 K/UL

## 2024-10-30 RX ORDER — GLUCOSA SU 2KCL/CHONDROITIN SU 500-400 MG
500 CAPSULE ORAL DAILY
Qty: 1 | Refills: 0 | Status: ACTIVE | COMMUNITY
Start: 2024-10-30 | End: 1900-01-01

## 2024-10-31 LAB
VIT B1 SERPL-MCNC: 124.4 NMOL/L
VIT B6 SERPL-MCNC: 10.1 UG/L

## 2024-12-06 ENCOUNTER — APPOINTMENT (OUTPATIENT)
Dept: PULMONOLOGY | Facility: CLINIC | Age: 84
End: 2024-12-06
Payer: MEDICARE

## 2024-12-06 VITALS
HEART RATE: 64 BPM | HEIGHT: 61 IN | RESPIRATION RATE: 15 BRPM | OXYGEN SATURATION: 95 % | SYSTOLIC BLOOD PRESSURE: 140 MMHG | WEIGHT: 193 LBS | BODY MASS INDEX: 36.44 KG/M2 | DIASTOLIC BLOOD PRESSURE: 65 MMHG | TEMPERATURE: 97 F

## 2024-12-06 DIAGNOSIS — G47.33 OBSTRUCTIVE SLEEP APNEA (ADULT) (PEDIATRIC): ICD-10-CM

## 2024-12-06 PROCEDURE — 99213 OFFICE O/P EST LOW 20 MIN: CPT

## 2024-12-09 ENCOUNTER — APPOINTMENT (OUTPATIENT)
Dept: FAMILY MEDICINE | Facility: CLINIC | Age: 84
End: 2024-12-09

## 2024-12-17 ENCOUNTER — APPOINTMENT (OUTPATIENT)
Dept: NEUROLOGY | Facility: HOSPITAL | Age: 84
End: 2024-12-17

## 2024-12-23 ENCOUNTER — APPOINTMENT (OUTPATIENT)
Dept: INTERNAL MEDICINE | Facility: CLINIC | Age: 84
End: 2024-12-23

## 2024-12-23 DIAGNOSIS — R74.8 ABNORMAL LEVELS OF OTHER SERUM ENZYMES: ICD-10-CM

## 2024-12-26 ENCOUNTER — OUTPATIENT (OUTPATIENT)
Dept: OUTPATIENT SERVICES | Facility: HOSPITAL | Age: 84
LOS: 1 days | Discharge: HOME | End: 2024-12-26

## 2024-12-26 ENCOUNTER — APPOINTMENT (OUTPATIENT)
Facility: HOSPITAL | Age: 84
End: 2024-12-26
Payer: MEDICARE

## 2024-12-26 DIAGNOSIS — G47.30 SLEEP APNEA, UNSPECIFIED: ICD-10-CM

## 2024-12-26 PROCEDURE — 95811 POLYSOM 6/>YRS CPAP 4/> PARM: CPT | Mod: 26

## 2025-01-15 ENCOUNTER — OUTPATIENT (OUTPATIENT)
Dept: OUTPATIENT SERVICES | Facility: HOSPITAL | Age: 85
LOS: 1 days | End: 2025-01-15
Payer: MEDICARE

## 2025-01-15 ENCOUNTER — LABORATORY RESULT (OUTPATIENT)
Age: 85
End: 2025-01-15

## 2025-01-15 ENCOUNTER — APPOINTMENT (OUTPATIENT)
Dept: INTERNAL MEDICINE | Facility: CLINIC | Age: 85
End: 2025-01-15
Payer: MEDICARE

## 2025-01-15 VITALS
DIASTOLIC BLOOD PRESSURE: 74 MMHG | SYSTOLIC BLOOD PRESSURE: 142 MMHG | HEIGHT: 61 IN | OXYGEN SATURATION: 98 % | BODY MASS INDEX: 37 KG/M2 | HEART RATE: 63 BPM | WEIGHT: 196 LBS

## 2025-01-15 DIAGNOSIS — Z23 ENCOUNTER FOR IMMUNIZATION: ICD-10-CM

## 2025-01-15 DIAGNOSIS — G62.9 POLYNEUROPATHY, UNSPECIFIED: ICD-10-CM

## 2025-01-15 DIAGNOSIS — E03.9 HYPOTHYROIDISM, UNSPECIFIED: ICD-10-CM

## 2025-01-15 DIAGNOSIS — I10 ESSENTIAL (PRIMARY) HYPERTENSION: ICD-10-CM

## 2025-01-15 PROCEDURE — 84443 ASSAY THYROID STIM HORMONE: CPT

## 2025-01-15 PROCEDURE — 99213 OFFICE O/P EST LOW 20 MIN: CPT | Mod: GE

## 2025-01-15 PROCEDURE — 84439 ASSAY OF FREE THYROXINE: CPT

## 2025-01-15 PROCEDURE — G0463: CPT

## 2025-01-17 LAB — TSH SERPL-ACNC: 7.54 UIU/ML

## 2025-01-21 ENCOUNTER — OUTPATIENT (OUTPATIENT)
Dept: OUTPATIENT SERVICES | Facility: HOSPITAL | Age: 85
LOS: 1 days | End: 2025-01-21
Payer: MEDICARE

## 2025-01-21 ENCOUNTER — APPOINTMENT (OUTPATIENT)
Dept: NEUROLOGY | Facility: HOSPITAL | Age: 85
End: 2025-01-21
Payer: MEDICARE

## 2025-01-21 VITALS
RESPIRATION RATE: 15 BRPM | TEMPERATURE: 97.8 F | OXYGEN SATURATION: 95 % | HEIGHT: 60 IN | DIASTOLIC BLOOD PRESSURE: 79 MMHG | SYSTOLIC BLOOD PRESSURE: 128 MMHG | BODY MASS INDEX: 38.87 KG/M2 | WEIGHT: 198 LBS | HEART RATE: 70 BPM

## 2025-01-21 DIAGNOSIS — G56.03 CARPAL TUNNEL SYNDROM,BILATERAL UPPER LIMBS: ICD-10-CM

## 2025-01-21 DIAGNOSIS — R51.9 HEADACHE, UNSPECIFIED: ICD-10-CM

## 2025-01-21 PROCEDURE — G0463: CPT

## 2025-01-21 PROCEDURE — 99212 OFFICE O/P EST SF 10 MIN: CPT

## 2025-01-21 RX ORDER — TIMOLOL MALEATE 5 MG/ML
0.5 SOLUTION OPHTHALMIC
Refills: 0 | Status: ACTIVE | COMMUNITY
Start: 2025-01-21

## 2025-01-22 DIAGNOSIS — G62.9 POLYNEUROPATHY, UNSPECIFIED: ICD-10-CM

## 2025-01-22 DIAGNOSIS — G56.00 CARPAL TUNNEL SYNDROME, UNSPECIFIED UPPER LIMB: ICD-10-CM

## 2025-01-22 DIAGNOSIS — E03.9 HYPOTHYROIDISM, UNSPECIFIED: ICD-10-CM

## 2025-01-23 ENCOUNTER — APPOINTMENT (OUTPATIENT)
Dept: PULMONOLOGY | Facility: CLINIC | Age: 85
End: 2025-01-23
Payer: MEDICARE

## 2025-01-23 VITALS
SYSTOLIC BLOOD PRESSURE: 148 MMHG | HEIGHT: 60 IN | WEIGHT: 196.3 LBS | OXYGEN SATURATION: 98 % | DIASTOLIC BLOOD PRESSURE: 72 MMHG | BODY MASS INDEX: 38.54 KG/M2 | HEART RATE: 62 BPM

## 2025-01-23 DIAGNOSIS — J45.909 UNSPECIFIED ASTHMA, UNCOMPLICATED: ICD-10-CM

## 2025-01-23 DIAGNOSIS — G47.33 OBSTRUCTIVE SLEEP APNEA (ADULT) (PEDIATRIC): ICD-10-CM

## 2025-01-23 PROCEDURE — 99214 OFFICE O/P EST MOD 30 MIN: CPT | Mod: 25

## 2025-01-23 PROCEDURE — 94060 EVALUATION OF WHEEZING: CPT

## 2025-01-23 PROCEDURE — ZZZZZ: CPT

## 2025-01-23 PROCEDURE — 94729 DIFFUSING CAPACITY: CPT

## 2025-01-23 PROCEDURE — 94726 PLETHYSMOGRAPHY LUNG VOLUMES: CPT

## 2025-01-23 RX ORDER — ALBUTEROL SULFATE AND BUDESONIDE 90; 80 UG/1; UG/1
90-80 AEROSOL, METERED RESPIRATORY (INHALATION) EVERY 4 HOURS
Qty: 1 | Refills: 3 | Status: ACTIVE | COMMUNITY
Start: 2025-01-23 | End: 1900-01-01

## 2025-03-14 ENCOUNTER — APPOINTMENT (OUTPATIENT)
Dept: DERMATOLOGY | Facility: CLINIC | Age: 85
End: 2025-03-14
Payer: MEDICARE

## 2025-03-14 DIAGNOSIS — L60.8 OTHER NAIL DISORDERS: ICD-10-CM

## 2025-03-14 PROCEDURE — 99213 OFFICE O/P EST LOW 20 MIN: CPT

## 2025-03-25 ENCOUNTER — NON-APPOINTMENT (OUTPATIENT)
Age: 85
End: 2025-03-25

## 2025-03-25 ENCOUNTER — APPOINTMENT (OUTPATIENT)
Dept: OPHTHALMOLOGY | Facility: CLINIC | Age: 85
End: 2025-03-25
Payer: MEDICARE

## 2025-03-25 PROCEDURE — 92014 COMPRE OPH EXAM EST PT 1/>: CPT | Mod: 25

## 2025-03-25 PROCEDURE — 92250 FUNDUS PHOTOGRAPHY W/I&R: CPT

## 2025-03-31 ENCOUNTER — APPOINTMENT (OUTPATIENT)
Dept: CARDIOLOGY | Facility: CLINIC | Age: 85
End: 2025-03-31

## 2025-03-31 ENCOUNTER — NON-APPOINTMENT (OUTPATIENT)
Age: 85
End: 2025-03-31

## 2025-03-31 VITALS
HEART RATE: 55 BPM | DIASTOLIC BLOOD PRESSURE: 74 MMHG | HEIGHT: 60 IN | OXYGEN SATURATION: 95 % | SYSTOLIC BLOOD PRESSURE: 135 MMHG | BODY MASS INDEX: 37.89 KG/M2 | WEIGHT: 193 LBS

## 2025-03-31 PROCEDURE — 99214 OFFICE O/P EST MOD 30 MIN: CPT | Mod: 25

## 2025-03-31 PROCEDURE — 93000 ELECTROCARDIOGRAM COMPLETE: CPT

## 2025-04-30 ENCOUNTER — APPOINTMENT (OUTPATIENT)
Dept: INTERNAL MEDICINE | Facility: CLINIC | Age: 85
End: 2025-04-30
Payer: MEDICARE

## 2025-04-30 ENCOUNTER — OUTPATIENT (OUTPATIENT)
Dept: OUTPATIENT SERVICES | Facility: HOSPITAL | Age: 85
LOS: 1 days | End: 2025-04-30
Payer: COMMERCIAL

## 2025-04-30 VITALS
HEART RATE: 55 BPM | OXYGEN SATURATION: 97 % | HEIGHT: 60 IN | SYSTOLIC BLOOD PRESSURE: 114 MMHG | DIASTOLIC BLOOD PRESSURE: 70 MMHG | BODY MASS INDEX: 36.91 KG/M2 | WEIGHT: 188 LBS

## 2025-04-30 DIAGNOSIS — E03.9 HYPOTHYROIDISM, UNSPECIFIED: ICD-10-CM

## 2025-04-30 DIAGNOSIS — I10 ESSENTIAL (PRIMARY) HYPERTENSION: ICD-10-CM

## 2025-04-30 DIAGNOSIS — Z23 ENCOUNTER FOR IMMUNIZATION: ICD-10-CM

## 2025-04-30 PROCEDURE — G0463: CPT

## 2025-04-30 PROCEDURE — 84443 ASSAY THYROID STIM HORMONE: CPT

## 2025-04-30 PROCEDURE — 99213 OFFICE O/P EST LOW 20 MIN: CPT | Mod: GE

## 2025-04-30 PROCEDURE — 84439 ASSAY OF FREE THYROXINE: CPT

## 2025-05-01 LAB
T4 FREE SERPL-MCNC: 1.3 NG/DL
TSH SERPL-ACNC: 3.41 UIU/ML

## 2025-05-05 DIAGNOSIS — E03.9 HYPOTHYROIDISM, UNSPECIFIED: ICD-10-CM

## 2025-05-08 ENCOUNTER — APPOINTMENT (OUTPATIENT)
Dept: PULMONOLOGY | Facility: CLINIC | Age: 85
End: 2025-05-08
Payer: MEDICARE

## 2025-05-08 VITALS
HEART RATE: 63 BPM | TEMPERATURE: 98.1 F | DIASTOLIC BLOOD PRESSURE: 70 MMHG | HEIGHT: 60 IN | BODY MASS INDEX: 37.3 KG/M2 | OXYGEN SATURATION: 95 % | WEIGHT: 190 LBS | SYSTOLIC BLOOD PRESSURE: 147 MMHG

## 2025-05-08 DIAGNOSIS — G47.33 OBSTRUCTIVE SLEEP APNEA (ADULT) (PEDIATRIC): ICD-10-CM

## 2025-05-08 PROCEDURE — 99214 OFFICE O/P EST MOD 30 MIN: CPT

## 2025-05-08 PROCEDURE — G2211 COMPLEX E/M VISIT ADD ON: CPT

## 2025-05-11 PROBLEM — I25.10 ARTERIOSCLEROSIS OF CORONARY ARTERY: Status: ACTIVE | Noted: 2025-05-11

## 2025-05-27 ENCOUNTER — APPOINTMENT (OUTPATIENT)
Dept: PULMONOLOGY | Facility: CLINIC | Age: 85
End: 2025-05-27

## 2025-05-31 NOTE — REVIEW OF SYSTEMS
Drink plenty of fluids    The culture will take about 3 days to come back and you will be notified with the result.    If your symptoms worsen or persist you should be re-evaluated.    Return in 3-5 days here or with your primary doctor if your symptoms are not improving.     [Recent Change In Weight] : ~T recent weight change [Vision Problems] : vision problems [Nocturia] : nocturia [Frequency] : frequency [Negative] : Heme/Lymph [Fever] : no fever [Hot Flashes] : no hot flashes [Chills] : no chills [Fatigue] : no fatigue [Pain] : no pain [Night Sweats] : no night sweats [Discharge] : no discharge [Dryness] : no dryness [Redness] : no redness [Itching] : no itching [Incontinence] : no incontinence [Hesitancy] : no hesitancy [Dysuria] : no dysuria [Impotence] : no impotency [Poor Libido] : libido not poor [Hematuria] : no hematuria [FreeTextEntry2] : Weight gain, however attributes to diet  [FreeTextEntry3] : States trouble reading at night

## 2025-06-02 ENCOUNTER — APPOINTMENT (OUTPATIENT)
Dept: PULMONOLOGY | Facility: CLINIC | Age: 85
End: 2025-06-02

## 2025-06-02 ENCOUNTER — TRANSCRIPTION ENCOUNTER (OUTPATIENT)
Age: 85
End: 2025-06-02

## 2025-07-01 ENCOUNTER — APPOINTMENT (OUTPATIENT)
Dept: NEUROLOGY | Facility: HOSPITAL | Age: 85
End: 2025-07-01
Payer: MEDICARE

## 2025-07-01 ENCOUNTER — OUTPATIENT (OUTPATIENT)
Dept: OUTPATIENT SERVICES | Facility: HOSPITAL | Age: 85
LOS: 1 days | End: 2025-07-01
Payer: COMMERCIAL

## 2025-07-01 VITALS
BODY MASS INDEX: 37.89 KG/M2 | HEART RATE: 63 BPM | DIASTOLIC BLOOD PRESSURE: 69 MMHG | RESPIRATION RATE: 14 BRPM | WEIGHT: 193 LBS | TEMPERATURE: 98.3 F | OXYGEN SATURATION: 94 % | SYSTOLIC BLOOD PRESSURE: 128 MMHG | HEIGHT: 60 IN

## 2025-07-01 DIAGNOSIS — R51.9 HEADACHE, UNSPECIFIED: ICD-10-CM

## 2025-07-01 PROCEDURE — G0463: CPT

## 2025-07-01 PROCEDURE — 99212 OFFICE O/P EST SF 10 MIN: CPT

## 2025-07-03 DIAGNOSIS — G56.03 CARPAL TUNNEL SYNDROME, BILATERAL UPPER LIMBS: ICD-10-CM

## 2025-07-23 ENCOUNTER — OUTPATIENT (OUTPATIENT)
Dept: OUTPATIENT SERVICES | Facility: HOSPITAL | Age: 85
LOS: 1 days | End: 2025-07-23
Payer: COMMERCIAL

## 2025-07-23 ENCOUNTER — APPOINTMENT (OUTPATIENT)
Dept: ORTHOPEDIC SURGERY | Facility: HOSPITAL | Age: 85
End: 2025-07-23

## 2025-07-23 VITALS
SYSTOLIC BLOOD PRESSURE: 132 MMHG | DIASTOLIC BLOOD PRESSURE: 71 MMHG | TEMPERATURE: 98.2 F | RESPIRATION RATE: 16 BRPM | HEART RATE: 57 BPM | OXYGEN SATURATION: 96 %

## 2025-07-23 DIAGNOSIS — M79.9 SOFT TISSUE DISORDER, UNSPECIFIED: ICD-10-CM

## 2025-07-23 DIAGNOSIS — G56.03 CARPAL TUNNEL SYNDROM,BILATERAL UPPER LIMBS: ICD-10-CM

## 2025-07-23 PROCEDURE — G0463: CPT

## 2025-07-24 DIAGNOSIS — G56.03 CARPAL TUNNEL SYNDROME, BILATERAL UPPER LIMBS: ICD-10-CM

## 2025-09-12 ENCOUNTER — NON-APPOINTMENT (OUTPATIENT)
Age: 85
End: 2025-09-12

## 2025-09-12 ENCOUNTER — APPOINTMENT (OUTPATIENT)
Dept: DERMATOLOGY | Facility: CLINIC | Age: 85
End: 2025-09-12
Payer: MEDICARE

## 2025-09-12 VITALS — HEIGHT: 60 IN | WEIGHT: 193 LBS | BODY MASS INDEX: 37.89 KG/M2

## 2025-09-12 DIAGNOSIS — L60.8 OTHER NAIL DISORDERS: ICD-10-CM

## 2025-09-12 PROCEDURE — 99213 OFFICE O/P EST LOW 20 MIN: CPT
